# Patient Record
Sex: FEMALE | Race: WHITE | NOT HISPANIC OR LATINO | Employment: PART TIME | URBAN - METROPOLITAN AREA
[De-identification: names, ages, dates, MRNs, and addresses within clinical notes are randomized per-mention and may not be internally consistent; named-entity substitution may affect disease eponyms.]

---

## 2015-07-30 LAB
EXTERNAL HIV SCREEN: NORMAL
HCV AB SER-ACNC: NORMAL

## 2017-04-18 ENCOUNTER — ALLSCRIPTS OFFICE VISIT (OUTPATIENT)
Dept: OTHER | Facility: OTHER | Age: 22
End: 2017-04-18

## 2017-06-02 ENCOUNTER — ALLSCRIPTS OFFICE VISIT (OUTPATIENT)
Dept: OTHER | Facility: OTHER | Age: 22
End: 2017-06-02

## 2017-06-02 DIAGNOSIS — J02.9 ACUTE PHARYNGITIS: ICD-10-CM

## 2017-06-03 ENCOUNTER — HOSPITAL ENCOUNTER (EMERGENCY)
Facility: HOSPITAL | Age: 22
Discharge: HOME | End: 2017-06-04
Payer: COMMERCIAL

## 2017-06-03 VITALS
WEIGHT: 240 LBS | SYSTOLIC BLOOD PRESSURE: 185 MMHG | DIASTOLIC BLOOD PRESSURE: 93 MMHG | HEART RATE: 112 BPM | RESPIRATION RATE: 18 BRPM | BODY MASS INDEX: 38.57 KG/M2 | TEMPERATURE: 99 F | HEIGHT: 66 IN | OXYGEN SATURATION: 99 %

## 2017-06-03 DIAGNOSIS — J02.9 PHARYNGITIS, UNSPECIFIED ETIOLOGY: Primary | ICD-10-CM

## 2017-06-03 LAB — S PYO AG THROAT QL: NEGATIVE

## 2017-06-03 PROCEDURE — 87070 CULTURE OTHR SPECIMN AEROBIC: CPT | Performed by: NURSE PRACTITIONER

## 2017-06-03 PROCEDURE — 87430 STREP A AG IA: CPT | Performed by: NURSE PRACTITIONER

## 2017-06-04 PROCEDURE — 99283 EMERGENCY DEPT VISIT LOW MDM: CPT

## 2017-06-06 LAB — BACTERIA THROAT CULT: NORMAL

## 2017-07-10 ENCOUNTER — ALLSCRIPTS OFFICE VISIT (OUTPATIENT)
Dept: OTHER | Facility: OTHER | Age: 22
End: 2017-07-10

## 2017-07-28 ENCOUNTER — ALLSCRIPTS OFFICE VISIT (OUTPATIENT)
Dept: OTHER | Facility: OTHER | Age: 22
End: 2017-07-28

## 2017-08-07 ENCOUNTER — GENERIC CONVERSION - ENCOUNTER (OUTPATIENT)
Dept: OTHER | Facility: OTHER | Age: 22
End: 2017-08-07

## 2017-09-07 ENCOUNTER — ALLSCRIPTS OFFICE VISIT (OUTPATIENT)
Dept: OTHER | Facility: OTHER | Age: 22
End: 2017-09-07

## 2017-10-06 ENCOUNTER — GENERIC CONVERSION - ENCOUNTER (OUTPATIENT)
Dept: OTHER | Facility: OTHER | Age: 22
End: 2017-10-06

## 2017-10-27 ENCOUNTER — HOSPITAL ENCOUNTER (EMERGENCY)
Facility: HOSPITAL | Age: 22
Discharge: HOME | End: 2017-10-27
Attending: EMERGENCY MEDICINE
Payer: COMMERCIAL

## 2017-10-27 VITALS
HEIGHT: 65 IN | DIASTOLIC BLOOD PRESSURE: 89 MMHG | BODY MASS INDEX: 38.32 KG/M2 | WEIGHT: 230 LBS | RESPIRATION RATE: 18 BRPM | SYSTOLIC BLOOD PRESSURE: 146 MMHG | HEART RATE: 74 BPM | TEMPERATURE: 99.2 F | OXYGEN SATURATION: 99 %

## 2017-10-27 DIAGNOSIS — H65.192 ACUTE EFFUSION OF LEFT EAR: ICD-10-CM

## 2017-10-27 DIAGNOSIS — H73.012 BULLOUS MYRINGITIS OF LEFT EAR: Primary | ICD-10-CM

## 2017-10-27 PROCEDURE — 99282 EMERGENCY DEPT VISIT SF MDM: CPT

## 2017-10-27 RX ORDER — OXYMETAZOLINE HYDROCHLORIDE 0.05 G/100ML
2 SPRAY NASAL ONCE
Status: COMPLETED | OUTPATIENT
Start: 2017-10-27 | End: 2017-10-27

## 2017-10-27 RX ORDER — AMOXICILLIN 500 MG/1
1000 CAPSULE ORAL EVERY 12 HOURS SCHEDULED
Qty: 40 CAPSULE | Refills: 0 | Status: SHIPPED | OUTPATIENT
Start: 2017-10-27 | End: 2017-11-06

## 2017-10-27 RX ORDER — FLUTICASONE PROPIONATE 50 MCG
1 SPRAY, SUSPENSION (ML) NASAL DAILY
Qty: 16 G | Refills: 0 | Status: SHIPPED | OUTPATIENT
Start: 2017-10-27 | End: 2019-01-11

## 2017-10-27 RX ORDER — AMOXICILLIN 250 MG/1
1000 CAPSULE ORAL ONCE
Status: COMPLETED | OUTPATIENT
Start: 2017-10-27 | End: 2017-10-27

## 2017-10-27 RX ADMIN — OXYMETAZOLINE HYDROCHLORIDE 2 SPRAY: 5 SPRAY NASAL at 22:49

## 2017-10-27 RX ADMIN — Medication 10 MG: at 22:49

## 2017-10-27 RX ADMIN — AMOXICILLIN 1000 MG: 250 CAPSULE ORAL at 22:49

## 2017-10-28 NOTE — ED PROVIDER NOTES
History  Chief Complaint   Patient presents with    Sore Throat     Pt c/o L sided ear pain and L sided throat pain for three days  Denies fevers at home, as not been taking any medication at home      67-year-old female denies past medical history presenting chief complaint of left ear pain  Patient states that 3 days ago she woke up with an ache in her left ear feels full she states has gotten progressively worse, described as an aching sensation, she states the pain radiates in the left side of her throat posterior throat it is worse when she swallows and she feels a popping in her left ear from time to time when she swallows  She is here with 3 days of symptoms there are no other exacerbating or remitting factors she denies injury drainage change in hearing difficulty with her balance headache neck pain or stiffness fevers nausea vomiting abdominal pain change in bowels or bladder leg swelling calf pain or tenderness or rashes  Her last normal menstrual period started 3 days ago  She has no other acute complaints or concerns denies a complete review of systems as noted            None       History reviewed  No pertinent past medical history  History reviewed  No pertinent surgical history  History reviewed  No pertinent family history  I have reviewed and agree with the history as documented  Social History   Substance Use Topics    Smoking status: Former Smoker    Smokeless tobacco: Never Used    Alcohol use Yes      Comment: socially        Review of Systems   Constitutional: Negative for chills and fever  HENT: Positive for ear pain and sore throat  Negative for congestion, dental problem, drooling, ear discharge, facial swelling, hearing loss, rhinorrhea, trouble swallowing and voice change  Eyes: Negative for photophobia and pain  Respiratory: Negative for cough and shortness of breath  Cardiovascular: Negative for chest pain and palpitations     Gastrointestinal: Negative for abdominal pain, nausea and vomiting  Genitourinary: Negative for dysuria, frequency and urgency  Musculoskeletal: Negative for neck pain and neck stiffness  Skin: Negative for color change and rash  Neurological: Negative for dizziness, facial asymmetry, weakness and headaches  Hematological: Negative for adenopathy  Does not bruise/bleed easily  Psychiatric/Behavioral: Negative for agitation and behavioral problems  All other systems reviewed and are negative  Physical Exam  ED Triage Vitals [10/27/17 2122]   Temperature Pulse Respirations Blood Pressure SpO2   99 2 °F (37 3 °C) 95 18 162/91 98 %      Temp Source Heart Rate Source Patient Position - Orthostatic VS BP Location FiO2 (%)   Oral Monitor Sitting Right arm --      Pain Score       7           Orthostatic Vital Signs  Vitals:    10/27/17 2122 10/27/17 2249   BP: 162/91 146/89   Pulse: 95 74   Patient Position - Orthostatic VS: Sitting Sitting       Physical Exam   Constitutional: She is oriented to person, place, and time  She appears well-developed and well-nourished  No distress  Well-appearing in no acute distress   HENT:   Head: Normocephalic and atraumatic  Patient's head neck exam is significant for left serous effusion, her left tympanic membrane at approximately 10-12 o'clock is severely red however appears intact there is no drainage, there is no debris in canal there is no surrounding erythema or mastoid tenderness, her posterior oropharynx is mildly erythematous although she has phonating fluently without stridor trismus she has full range of motion of her neck and hyoid without pain there is no adenopathy posterior or pharyngeal structures are midline without exudate her right tympanic membranes unremarkable otherwise unremarkable head neck exam   Eyes: EOM are normal  Pupils are equal, round, and reactive to light  Neck: Normal range of motion  Neck supple  No tracheal deviation present     Cardiovascular: Normal rate, regular rhythm and normal heart sounds  Exam reveals no gallop and no friction rub  No murmur heard  Pulmonary/Chest: Effort normal and breath sounds normal  She has no wheezes  She has no rales  Musculoskeletal: Normal range of motion  She exhibits no edema or tenderness  Neurological: She is alert and oriented to person, place, and time  No cranial nerve deficit  She exhibits normal muscle tone  Coordination normal    Skin: Skin is warm and dry  No rash noted  Psychiatric: She has a normal mood and affect  Her behavior is normal    Nursing note and vitals reviewed        ED Medications  Medications   dexamethasone 10 mg/mL oral liquid 10 mg 1 mL (10 mg Oral Given 10/27/17 2249)   oxymetazoline (AFRIN) 0 05 % nasal spray 2 spray (2 sprays Each Nare Given 10/27/17 2249)   amoxicillin (AMOXIL) capsule 1,000 mg (1,000 mg Oral Given 10/27/17 2249)       Diagnostic Studies  Results Reviewed     None                 No orders to display              Procedures  Procedures       Phone Contacts  ED Phone Contact    ED Course  ED Course as of Oct 29 1156   Fri Oct 27, 2017   2303 Patient seen and re-evaluated discharge, clinically she appears to have bullous myringitis with the left serous effusion offered to treat symptomatically observe versus give antibiotics for her left bullous myringitis she opted for antibiotics that she is going to [place] tomorrow, will treat some began truck was return instructions patient agreeable plan                                MDM  Number of Diagnoses or Management Options  Acute effusion of left ear:   Bullous myringitis of left ear:   Diagnosis management comments: 58-year-old female without past medical history with 3 days of progressive left ear pain also notes some mild throat pain, on exam she is afebrile vital signs she is clinically very well appearing her left tympanic membrane is intensely erythematous in the 10-12 o'clock position with underlying serous effusion, possibly consistent with bullous myringitis with her described pain, her posterior oropharynx is mildly erythematous without other acute findings, offered patient symptom control versus antibiotics for presumed bullous myringitis, patient opted for antibiotics, will treat symptomatically close return instructions, patient agreeable to plan    CritCare Time    Disposition  Final diagnoses:   Bullous myringitis of left ear   Acute effusion of left ear     Time reflects when diagnosis was documented in both MDM as applicable and the Disposition within this note     Time User Action Codes Description Comment    10/27/2017 11:04 PM Yovany Nieves Add [H73 012] Bullous myringitis of left ear     10/27/2017 11:04 PM Lucian Lim Add [H65 [id number]] Acute effusion of left ear       ED Disposition     ED Disposition Condition Comment    Discharge  [address] [facility] [address] discharge to home/self care  Condition at discharge: Good        Follow-up Information     Follow up With Specialties Details Why Contact Info Additional Information    [facility] Emergency Medicine  If symptoms worsen [address] [facility] [address]  [phone] [place] [facility], [address], [place], [address] Kurtis Farr MD Internal Medicine  As needed [address] [facility] [address] [facility]  [phone]           Discharge Medication List as of 10/27/2017 11:05 PM      START taking these medications    Details   amoxicillin (AMOXIL) 500 mg capsule Take 2 capsules by mouth every 12 (twelve) hours for 10 days, Starting Fri 10/27/2017, Until Mon 11/6/2017, Print      fluticasone (FLONASE) 50 mcg/act nasal spray 1 spray into each nostril daily, Starting Fri 10/27/2017, Print           No discharge procedures on file      ED Provider  Electronically Signed by           Tamar Santos DO  10/29/17 6150

## 2017-10-28 NOTE — DISCHARGE INSTRUCTIONS
Please try these medications return if you have worsening or other concerning symptoms otherwise follow up as instructed      Otitis Media   WHAT YOU NEED TO KNOW:   Otitis media is an ear infection  DISCHARGE INSTRUCTIONS:   Medicines:  · Ibuprofen or acetaminophen  helps decrease your pain and fever  They are available without a doctor's order  Ask your healthcare provider which medicine is right for you  Ask how much to take and how often to take it  These medicines can cause stomach bleeding if not taken correctly  Ibuprofen can cause kidney damage  Do not take ibuprofen if you have kidney disease, an ulcer, or allergies to aspirin  Acetaminophen can cause liver damage  Do not drink alcohol if you take acetaminophen  · Ear drops  help treat your ear pain  · Antibiotics  help treat a bacterial infection that caused your ear infection  · Take your medicine as directed  Contact your healthcare provider if you think your medicine is not helping or if you have side effects  Tell him or her if you are allergic to any medicine  Keep a list of the medicines, vitamins, and herbs you take  Include the amounts, and when and why you take them  Bring the list or the pill bottles to follow-up visits  Carry your medicine list with you in case of an emergency  Heat or ice:   · Heat  may be used to decrease your pain  Place a warm, moist washcloth on your ear  Apply for 15 to 20 minutes, 3 to 4 times a day    · Ice  helps decrease swelling and pain  Use an ice pack or put crushed ice in a plastic bag  Cover the ice pack with a towel and place it on your ear for 15 to 20 minutes, 3 to 4 times a day for 2 days  Prevent otitis media:   · Wash your hands often  Use soap and water  Wash your hands after you use the bathroom, change a child's diapers, or sneeze  Wash your hands before you prepare or eat food  · Stay away from people who are ill  Some germs are easily and quickly spread through contact    Return to work or school: You may return to work or school when your fever is gone  Follow up with your healthcare provider as directed:  Write down your questions so you remember to ask them during your visits  Contact your healthcare provider if:   · Your ear pain gets worse or does not go away, even after treatment  · The outside of your ear is red or swollen  · You have vomiting or diarrhea  · You have fluid coming from your ear  · You have questions or concerns about your condition or care  Return to the emergency department if:   · You have a seizure  · You have a fever and a stiff neck  © 2017 [employer]  Information is for End User's use only and may not be sold, redistributed or otherwise used for commercial purposes  All illustrations and images included in CareNotes® are the copyrighted property of [employer]  or Allen Shultz  The above information is an  only  It is not intended as medical advice for individual conditions or treatments  Talk to your doctor, nurse or pharmacist before following any medical regimen to see if it is safe and effective for you  Serous Otitis Media   WHAT YOU NEED TO KNOW:   Serous otitis media is fluid trapped behind your tympanic membrane (eardrum), without an ear infection  Your eardrum is in your middle ear  Serous otitis media is also called otitis media with effusion  You may have fluid in your ear for months, but it usually goes away on its own  The fluid may be in one or both ears  The fluid may cause muffled sounds, and you may feel like your ears are full  Serous otitis media may be caused by an upper respiratory infection or allergies  It is most common in the fall and early spring  DISCHARGE INSTRUCTIONS:   Return to the emergency department if:   · You have a fever  · You have a sudden loss of hearing in your affected ear  · You develop a severe headache and stiff neck      · You have a seizure  Contact your healthcare provider if:   · You have fluid draining from your ear  · You have new symptoms  · You have questions or concerns about your condition or care  Follow up with your healthcare provider as directed: Your ears will need to be checked regularly  You may need to see a specialist  Write down your questions so you remember to ask them during your visits  © 2017 [employer] Information is for End User's use only and may not be sold, redistributed or otherwise used for commercial purposes  All illustrations and images included in CareNotes® are the copyrighted property of [employer]  or Allen Shultz  The above information is an  only  It is not intended as medical advice for individual conditions or treatments  Talk to your doctor, nurse or pharmacist before following any medical regimen to see if it is safe and effective for you

## 2018-01-10 NOTE — PROGRESS NOTES
History of Present Illness  Bariatric Behavioral Health Evaluation [facility]:   She is here today because: Increase health, increase mobility and prevent family health issues  She is seeking a Bariatric surgery evaluation  She researched this option for: Less than 1 month; referred by PCP  She realizes the post-op requirements Yes, patient has researched procedure; patient has friends with bariatric surgery  Her Psychiatric/Psychological diagnosis: She does not have an outpatient counselor  She does not have the counselor's number  She does not have a Psychiatrist  She does not have the Psychiatrist's number  She has not had Inpatient Treatment  (None reported )  Family Constellation: Family Constellation: Mother: 39years old  Father: 40years old  Siblings: 1 brother and 1 sister  She lives withher father   Domestic Violence: has not happened  Abuse History: (None reported )   Physical/psychological assessment Appearance: appropriate   Sociability: average  Affect: appropriate  Mood: calm  Thought Process: coherent  Speech: normal  Content: no impairment  The patient was oriented to person, oriented to place, oriented to time and normal memory   normal attention span  no decreased concentration ability  normal judgment  Her emotional insight was: good  Her intellectual insight was: good  Risk assessment: Symptoms:  no suicidal ideation, no suicide plan, no suicide attempt, no homicidal thoughts, no hopelessness, no helplessness, no feelings of despair, no anxiety, no depressed mood, no loss of interests, no anhedonia and no sense of isolation  The patient is currently asymptomatic  Associated symptoms:  no aggressive behavior, no high risk behavior, no racing thoughts, no periods of excess energy, no periods of euphoria, no delusions, no command hallucinations, no auditory hallucinations and no visual hallucinations  No associated symptoms are reported   The patient is not currently being treated for this problem  Pertinent medical history:  no depression, no seasonal affective disorder, no premenstrual dysphoric disorder, no postpartum depression, no anxiety disorder, no bipolar disorder, no post traumatic stress disorder, no eating disorder, no personality disorder, no schizophrenia, no chronic pain, no chronic headaches, no dementia, no malignancy and no HIV infection  Risk factors:  no bereavement, no financial stress, no relationship problems, no job loss, no social isolation, no access to lethal means, no alcohol abuse, no substance abuse, no physical abuse, no sexual abuse, no emotional abuse, no bullying, no previous suicide attempt, no recent psychiatric hospitalization and no recent family suicide  No risk factors have been identified  Family history:  no suicide, no depression, no bipolar disorder and no substance abuse  She was previously evaluated by me  Sexual history: She is not pregnant  She does not have a history of   She does not have a history of miscarriage  She does not have a history of hypersexuality  She does not have a history of STD's  Recommendations: She is recommended for surgery  She states adequate knowledge of nutrition, exercise, and behavior modification  The Following Ratings are based on my: Obsevation of this individual over the last  Risk of Harm to Self or Others: The following are demographic risk factors associated with harm to self: , [place], or   (None reported )  Recent Specific Risk Factors: The patient is currently asymptomatic  No associated symptoms are reported  Summary and Recommendations:   Low: No thoughts or occasional thoughts of suicide, but no intent or actions  Self inflicted scratches, abrasions, or other self- injurious of behavior where medical attention is typically not warranted  No elements of homicidality or occasional thoughts but no plan, intent, or actions  Active Problems    1   Benign essential hypertension (401 1) (I10)   2  Dental infection (522 4) (K04 7)   3  Encounter for administrative examinations (V68 9) (Z02 9)   4  Encounter for gynecological examination (V72 31) (Z01 419)   5  Morbid obesity (278 01) (E66 01)   6  Need for hepatitis B vaccination (V05 3) (Z23)   7  Post-nasal drip (784 91) (R09 82)   8  Screening for tuberculosis (V74 1) (Z11 1)    Past Medical History    1  History of acute pharyngitis (V12 69) (Z87 09)   2  History of hypertension (V12 59) (Z86 79)   3  History of upper respiratory infection (V12 09) (Z87 09)   4  History of Otitis media, unspecified laterality    Family History  Mother    1  Family history of Hypertension (V17 49)  Father    2  Family history of Coronary Artery Disease (V17 49)   3  Family history of Myocardial Infarction Arrhythmias    Social History    · Current smoker on some days (305 1) (F17 200)   · Currently In School   · Denied: History of Drug Use   · Former smoker (U30 08) (B20 188)   · Social alcohol use (Z78 9)    Current Meds   1  Advil 200 MG Oral Capsule; TAKE 1 CAPSULE EVERY 4 TO 6 HOURS; Therapy: [id number] to Recorded   2  One Daily Womens Oral Tablet; Therapy: (Recorded:[id number]) to Recorded    Allergies    1  No Known Drug Allergies    Vitals  Signs   Recorded: [id number] 11:19AM   Height: 5 ft 5 in  Weight: 254 lb 5 oz  BMI Calculated: 42 32  BSA Calculated: 2 19     Note   Note:   Completed [address] [place] [address]  Provided patient, education as needed  Patient denies to [place] I diagnosis  Patient will work on the following goals; identify strategies to practice in stead of reaching for food and increase physical activity  Patient is knowledgeable of pre and post op requirements and meets criteria for [facility] bariatric surgery program  Patient is referred to physician  Future Appointments    Date/Time Provider Specialty Site   10/06/2017 02:00 PM LAURITA Quintero   General Surgery [facility]   01/12/2018 09:15 AM LAURITA Carlson  Internal Medicine [facility]    09/07/2017 09:00 [facility]     Signatures   Electronically signed by : KARTHIK Aldana; Aug  7 2017 12:00PM EST                       (Author)    Electronically signed by :  LAURITA Doyle ; Aug  8 2017  9:16AM EST

## 2018-01-12 VITALS
DIASTOLIC BLOOD PRESSURE: 98 MMHG | WEIGHT: 254.13 LBS | HEIGHT: 65 IN | HEART RATE: 91 BPM | SYSTOLIC BLOOD PRESSURE: 142 MMHG | OXYGEN SATURATION: 98 % | BODY MASS INDEX: 42.34 KG/M2

## 2018-01-12 VITALS
HEART RATE: 92 BPM | SYSTOLIC BLOOD PRESSURE: 162 MMHG | OXYGEN SATURATION: 98 % | BODY MASS INDEX: 40.15 KG/M2 | HEIGHT: 65 IN | WEIGHT: 241 LBS | DIASTOLIC BLOOD PRESSURE: 94 MMHG | TEMPERATURE: 98.2 F

## 2018-01-12 NOTE — RESULT NOTES
Verified Results  MMR (IGG) PANEL (MEASLES, MUMPS, RUBELLA) [id number] 02:38PM Courtney Razo     Test Name Result Flag Reference   MEASLES ANTIBODY (IGG) 2 43 index     Index       Explanation of Test Results       ---------     ---------------------------      < or = 0 90    Negative - No Rubeola (Measles) IgG                                Antibody detected      0 91 - 1 09    Equivocal      > or = 1 10    Positive - Rubeola (Measles) IgG                                Antibody detected         Positive results suggest recent or previous infection      with Measles (Rubeola) virus and imply immunity  Patients exhibiting equivocal results should be      retested in one month, if clinically indicated  MUMPS VIRUS$ANTIBODY (IGG) 3 01 index     Index          Interpretation  < or = 0 90    Negative  0 91-1 09      Equivocal  > or = 1 10    Positive  A positive result indicates that the patient has antibody  to mumps virus  It does not differentiate between an  active or past infection  The clinical diagnosis must  be interpreted in conjunction with the clinical signs  and symptoms of the patient  RUBELLA ANTIBODY (IGG) 2 47     Value            Interpretation           -----            --------------             < or = 0 90      Not consistent with Immunity           0 91-1 09        Equivocal           > or = 1 10      Consistent with Immunity      The presence of rubella IgG antibody suggests   immunization or past or current infection with  rubella virus       (Q) VARICELLA ZOSTER VIRUS AB (IGG) [id number] 02:38PM Courtney Melendez   REPORT COMMENT:  FASTING:NO     Test Name Result Flag Reference   VARICELLA ZOSTER VIRUS$AB (IGG) 1 71 index     Index       Explanation  of Results       ---------     ----------------------      < or = 0 90    Negative - No VZV IgG Antibody detected      0 91 - 1 09    Equivocal      > or = 1 10    Positive - VZV IgG Antibody detected         A positive result indicates that the patient      has antibody to VZV but does not differentiate      between infection (active or past) and vaccination  The clinical diagnosis must be interpreted in       conjunction with the clinical signs and symptoms of       the patient  This assay reliably measures immunity      due to previous infection but may not always be       sensitive enough to detect antibodies induced by      vaccination  Thus, a negative result in a vaccinated      individual does not necessarily indicate      susceptibility to VZV infection

## 2018-01-13 VITALS — BODY MASS INDEX: 42.37 KG/M2 | HEIGHT: 65 IN | WEIGHT: 254.31 LBS

## 2018-01-14 VITALS
HEIGHT: 65 IN | TEMPERATURE: 98.4 F | OXYGEN SATURATION: 98 % | SYSTOLIC BLOOD PRESSURE: 148 MMHG | HEART RATE: 88 BPM | DIASTOLIC BLOOD PRESSURE: 94 MMHG | WEIGHT: 236.13 LBS | BODY MASS INDEX: 39.34 KG/M2

## 2018-01-14 VITALS — WEIGHT: 254.5 LBS | HEIGHT: 65 IN | BODY MASS INDEX: 42.4 KG/M2

## 2018-01-14 VITALS
TEMPERATURE: 98 F | HEART RATE: 72 BPM | RESPIRATION RATE: 22 BRPM | SYSTOLIC BLOOD PRESSURE: 134 MMHG | BODY MASS INDEX: 42.4 KG/M2 | HEIGHT: 65 IN | DIASTOLIC BLOOD PRESSURE: 100 MMHG | WEIGHT: 254.5 LBS

## 2018-01-15 VITALS
WEIGHT: 246.38 LBS | SYSTOLIC BLOOD PRESSURE: 152 MMHG | HEART RATE: 97 BPM | OXYGEN SATURATION: 98 % | DIASTOLIC BLOOD PRESSURE: 98 MMHG | BODY MASS INDEX: 41.05 KG/M2 | HEIGHT: 65 IN

## 2018-01-17 NOTE — MISCELLANEOUS
Provider Comments  Provider Comments:   Walker Young 87,    We called you about your scheduled appointment for today but were unable to reach you  It is very important that you follow up with us so that we can assess your physical and nutritional safety  Please call our office at [phone] to reschedule your appointment       Sincerely,     Andrew [facility]          Signatures   Electronically signed by : Fanny Jackson, ; Sep  7 2017  9:27AM EST                       (Author)

## 2018-01-17 NOTE — MISCELLANEOUS
Provider Comments  Provider Comments:   Dear Wayne Bull had a scheduled appointment at our office today but did not show  We attempted to call you back but were unable to reach you  It is very important that you follow up with us so that we can assess your physical and nutritional safety after your surgery  Please call our office at [phone] to reschedule your appointment  Sincerely,     Patria Jensen [facility]          Signatures   Electronically signed by :  LAURITA Godoy ; Nov 7 2017  9:21AM EST

## 2019-01-11 ENCOUNTER — HOSPITAL ENCOUNTER (EMERGENCY)
Facility: HOSPITAL | Age: 24
Discharge: HOME | End: 2019-01-12
Attending: EMERGENCY MEDICINE | Admitting: EMERGENCY MEDICINE
Payer: COMMERCIAL

## 2019-01-11 VITALS
HEART RATE: 90 BPM | OXYGEN SATURATION: 96 % | TEMPERATURE: 98 F | RESPIRATION RATE: 20 BRPM | HEIGHT: 65 IN | BODY MASS INDEX: 46.1 KG/M2 | WEIGHT: 276.68 LBS

## 2019-01-11 DIAGNOSIS — J02.9 VIRAL PHARYNGITIS: Primary | ICD-10-CM

## 2019-01-11 PROCEDURE — 99283 EMERGENCY DEPT VISIT LOW MDM: CPT

## 2019-01-12 LAB — S PYO AG THROAT QL: NEGATIVE

## 2019-01-12 PROCEDURE — 87430 STREP A AG IA: CPT | Performed by: EMERGENCY MEDICINE

## 2019-01-12 RX ORDER — OXYMETAZOLINE HYDROCHLORIDE 0.05 G/100ML
2 SPRAY NASAL ONCE
Status: COMPLETED | OUTPATIENT
Start: 2019-01-12 | End: 2019-01-12

## 2019-01-12 RX ORDER — NAPROXEN 250 MG/1
500 TABLET ORAL ONCE
Status: COMPLETED | OUTPATIENT
Start: 2019-01-12 | End: 2019-01-12

## 2019-01-12 RX ORDER — LORATADINE 10 MG/1
10 TABLET ORAL DAILY
COMMUNITY
End: 2022-04-25 | Stop reason: ALTCHOICE

## 2019-01-12 RX ORDER — NAPROXEN 500 MG/1
500 TABLET ORAL 2 TIMES DAILY WITH MEALS
Qty: 10 TABLET | Refills: 0 | Status: SHIPPED | OUTPATIENT
Start: 2019-01-12 | End: 2020-06-05

## 2019-01-12 RX ADMIN — OXYMETAZOLINE HYDROCHLORIDE 2 SPRAY: 0.05 SPRAY NASAL at 01:14

## 2019-01-12 RX ADMIN — NAPROXEN 500 MG: 250 TABLET ORAL at 01:14

## 2019-01-12 RX ADMIN — DEXAMETHASONE SODIUM PHOSPHATE 10 MG: 10 INJECTION, SOLUTION INTRAMUSCULAR; INTRAVENOUS at 00:37

## 2019-01-12 NOTE — DISCHARGE INSTRUCTIONS
Pharyngitis   WHAT YOU NEED TO KNOW:   What is pharyngitis? Pharyngitis, or sore throat, is inflammation of the tissues and structures in your pharynx (throat)  Pharyngitis is most often caused by bacteria  It may also be caused by a cold or flu virus  Other causes include smoking, allergies, or acid reflux  What signs and symptoms may occur with pharyngitis? · Sore throat or pain when you swallow    · Fever, chills, and body aches    · Hoarse or raspy voice    · Cough, runny or stuffy nose, itchy or watery eyes    · Headache    · Upset stomach and loss of appetite    · Mild neck stiffness    · Swollen glands that feel like hard lumps when you touch your neck    · White and yellow pus-filled blisters in the back of your throat  How is pharyngitis diagnosed? Tell your healthcare provider about your symptoms  He may look inside your throat and feel your neck  You may also need the following tests:  · A throat culture  may show which germ is causing your sore throat  A cotton swab is rubbed against the back of your throat  · Blood tests  may be used to show if another medical condition is causing your sore throat  How is pharyngitis treated? Viral pharyngitis will go away on its own without treatment  Your sore throat should start to feel better in 3 to 5 days for both viral and bacterial infections  You may need any of the following:  · Antibiotics  treat a bacterial infection  · NSAIDs , such as ibuprofen, help decrease swelling, pain, and fever  NSAIDs can cause stomach bleeding or kidney problems in certain people  If you take blood thinner medicine, always ask your healthcare provider if NSAIDs are safe for you  Always read the medicine label and follow directions  · Acetaminophen  decreases pain and fever  It is available without a doctor's order  Ask how much to take and how often to take it  Follow directions  Acetaminophen can cause liver damage if not taken correctly    How can I manage my symptoms? · Gargle salt water  Mix ¼ teaspoon salt in an 8 ounce glass of warm water and gargle  This may help decrease swelling in your throat  · Drink liquids as directed  You may need to drink more liquids than usual  Liquids may help soothe your throat and prevent dehydration  Ask how much liquid to drink each day and which liquids are best for you  · Use a cool-steam humidifier  to help moisten the air in your room and decrease your cough  · Soothe your throat  with cough drops, ice, soft foods, or popsicles  How can I prevent the spread of pharyngitis? Cover your mouth and nose when you cough or sneeze  Do not share food or drinks  Wash your hands often  Use soap and water  If soap and water are unavailable, use an alcohol based hand   Call 911 for any of the following:   · You have trouble breathing or swallowing because your throat is swollen or sore  When should I seek immediate care? · You are drooling because it hurts too much to swallow  · Your fever is higher than 102? F (39?C) or lasts longer than 3 days  · You are confused  · You taste blood in your throat  When should I contact my healthcare provider? · Your throat pain gets worse  · You have a painful lump in your throat that does not go away after 5 days  · Your symptoms do not improve after 5 days  · You have questions or concerns about your condition or care  CARE AGREEMENT:   You have the right to help plan your care  Learn about your health condition and how it may be treated  Discuss treatment options with your caregivers to decide what care you want to receive  You always have the right to refuse treatment  The above information is an  only  It is not intended as medical advice for individual conditions or treatments  Talk to your doctor, nurse or pharmacist before following any medical regimen to see if it is safe and effective for you    © 2017 [employer] Information is for End User's use only and may not be sold, redistributed or otherwise used for commercial purposes  All illustrations and images included in CareNotes® are the copyrighted property of [employer]  or Allen Shultz

## 2019-01-12 NOTE — ED PROVIDER NOTES
History  Chief Complaint   Patient presents with    Sore Throat     pt c/o of a sore throat and a right sided earche that started four days ago    Earache     59-year-old female who works at a  and is exposed to many sick children presents to the emergency department for sore throat and right ear pain  Patient states the symptoms have been worsening over the past 4 days  She states this feels similar to a time in the past when she was diagnosed with strep throat  She has attempted the home remedies without relief  She is having some nasal congestion as well  No facial tenderness, mild dry cough but no productive cough  No fevers or chills and presents here afebrile  Prior to Admission Medications   Prescriptions Last Dose Informant Patient Reported? Taking? Multiple Vitamins-Calcium (ONE-A-DAY WOMENS PO)   Yes Yes   Sig: Take by mouth   loratadine (CLARITIN) 10 mg tablet   Yes Yes   Sig: Take 10 mg by mouth daily      Facility-Administered Medications: None       History reviewed  No pertinent past medical history  History reviewed  No pertinent surgical history  History reviewed  No pertinent family history  I have reviewed and agree with the history as documented  Social History   Substance Use Topics    Smoking status: Former Smoker    Smokeless tobacco: Never Used    Alcohol use Yes      Comment: socially        Review of Systems   Constitutional: Negative for chills, fatigue and fever  HENT: Positive for congestion, ear pain (Right more than left) and sore throat  Negative for dental problem, ear discharge, rhinorrhea, sinus pain and sinus pressure  Respiratory: Negative for cough, chest tightness, shortness of breath and wheezing  Cardiovascular: Negative for chest pain and leg swelling  Gastrointestinal: Negative for abdominal pain, nausea and vomiting  Musculoskeletal: Negative for back pain and neck pain  Skin: Negative for wound     Neurological: Negative for dizziness and headaches  Physical Exam  Physical Exam   Constitutional: She is oriented to person, place, and time  She appears well-developed and well-nourished  No distress  HENT:   Head: Normocephalic and atraumatic  Right Ear: External ear normal    Left Ear: External ear normal    Mouth/Throat: Oropharynx is clear and moist  No oropharyngeal exudate (Erythema without exudate)  No TM redness or bulge  Minor fluid behind right TM  Eyes: Conjunctivae and EOM are normal    Neck: Normal range of motion  Cardiovascular: Normal rate and regular rhythm  Pulmonary/Chest: Effort normal and breath sounds normal  No stridor  No respiratory distress  She has no wheezes  She exhibits no tenderness  No rhonchi  Good air movement  Abdominal: Soft  There is no tenderness  Musculoskeletal: Normal range of motion  Lymphadenopathy:     She has cervical adenopathy ( anterior, mildly tender)  Neurological: She is alert and oriented to person, place, and time  No cranial nerve deficit  Skin: Skin is warm and dry  She is not diaphoretic  No pallor  Psychiatric: She has a normal mood and affect  Her behavior is normal    Vitals reviewed        Vital Signs  ED Triage Vitals [01/11/19 2355]   Temperature Pulse Respirations BP SpO2   98 °F (36 7 °C) 90 20 -- 96 %      Temp Source Heart Rate Source Patient Position - Orthostatic VS BP Location FiO2 (%)   Oral Monitor Sitting Right arm --      Pain Score       7           Vitals:    01/11/19 2355   Pulse: 90   Patient Position - Orthostatic VS: Sitting       Visual Acuity      ED Medications  Medications   dexamethasone 10 mg/mL oral liquid 10 mg 1 mL (10 mg Oral Given 1/12/19 0037)       Diagnostic Studies  Results Reviewed     Procedure Component Value Units Date/Time    Rapid Strep A Screen Only, Adults [[id number]]  (Normal) Collected:  01/12/19 0037    Lab Status:  Final result Specimen:  Throat from Throat Updated:  01/12/19 0050     Rapid Strep A Screen Negative                 No orders to display              Procedures  Procedures       Phone Contacts  ED Phone Contact    ED Course  ED Course as of Jan 12 0103   Sat Jan 12, 2019   0100 RAPID STREP A SCREEN: Negative                               [facility]  Number of Diagnoses or Management Options  Diagnosis management comments: Sore throat, earache  Will do strep swab for strep throat  Symptomatic treatment  Strep is negative  Likely viral pharyngitis  Patient will be discharged and advised symptomatic treatment for home  Advised good return precautions in case of worsening condition  Amount and/or Complexity of Data Reviewed  Clinical lab tests: ordered and reviewed      CritCare Time    Disposition  Final diagnoses:   Viral pharyngitis     Time reflects when diagnosis was documented in both MDM as applicable and the Disposition within this note     Time User Action Codes Description Comment    1/12/2019  1:01 AM Leslie Jensen [J02 9] Viral pharyngitis       ED Disposition     ED Disposition Condition Comment    Discharge  [address] [place] [address],[address] discharge to home/self care      Condition at discharge: Good        Follow-up Information     Follow up With Specialties Details Why [facility] Emergency Department Emergency Medicine  If symptoms worsen:  Pain, uncontrollable fever, difficulty breathing, unable to swallow, etc [address] [address] ED, [address] [place] [address],  Kurtis Farr MD Internal Medicine Schedule an appointment as soon as possible for a visit For follow up to ensure improvement, and for further testing and treatment as needed [address] [facility] [address] [facility]  [phone]             Patient's Medications   Discharge Prescriptions    NAPROXEN (NAPROSYN) 500 MG TABLET    Take 1 tablet (500 mg total) by mouth 2 (two) times a day with meals for 5 days As needed for pain control in throat       Start Date: 1/12/2019 End Date: 1/17/2019       Order Dose: 500 mg       Quantity: 10 tablet    Refills: 0     No discharge procedures on file      ED Provider  Electronically Signed by           Kimani Morrison DO  01/12/19 5237

## 2019-01-14 ENCOUNTER — TELEPHONE (OUTPATIENT)
Dept: INTERNAL MEDICINE CLINIC | Facility: CLINIC | Age: 24
End: 2019-01-14

## 2020-01-08 ENCOUNTER — OFFICE VISIT (OUTPATIENT)
Dept: URGENT CARE | Facility: CLINIC | Age: 25
End: 2020-01-08
Payer: COMMERCIAL

## 2020-01-08 VITALS
HEART RATE: 93 BPM | DIASTOLIC BLOOD PRESSURE: 96 MMHG | HEIGHT: 65 IN | BODY MASS INDEX: 43.49 KG/M2 | RESPIRATION RATE: 18 BRPM | TEMPERATURE: 98.2 F | SYSTOLIC BLOOD PRESSURE: 162 MMHG | OXYGEN SATURATION: 95 % | WEIGHT: 261 LBS

## 2020-01-08 DIAGNOSIS — J01.90 ACUTE SINUSITIS, RECURRENCE NOT SPECIFIED, UNSPECIFIED LOCATION: Primary | ICD-10-CM

## 2020-01-08 PROCEDURE — G0382 LEV 3 HOSP TYPE B ED VISIT: HCPCS | Performed by: PHYSICIAN ASSISTANT

## 2020-01-08 RX ORDER — AMOXICILLIN AND CLAVULANATE POTASSIUM 875; 125 MG/1; MG/1
1 TABLET, FILM COATED ORAL EVERY 12 HOURS SCHEDULED
Qty: 20 TABLET | Refills: 0 | Status: SHIPPED | OUTPATIENT
Start: 2020-01-08 | End: 2020-01-18

## 2020-01-09 NOTE — PROGRESS NOTES
[facility]        NAME: Yonatan Tovar is a 21 y o  female  : 1995    MRN: [id number]  DATE: 2020  TIME: 7:28 PM    Assessment and Plan   Acute sinusitis, recurrence not specified, unspecified location [J01 90]  1  Acute sinusitis, recurrence not specified, unspecified location  amoxicillin-clavulanate (AUGMENTIN) 875-125 mg per tablet         Patient Instructions     Follow up with PCP in 3-5 days  Proceed to  ER if symptoms worsen  Chief Complaint     Chief Complaint   Patient presents with    Cough     symptoms started 7 days ago  Pt states taking claritin but still having symptoms   Earache    Nasal Congestion    Sore Throat         History of Present Illness       26-year-old female presents for evaluation of cough, nasal congestion, sore throat and ear pain  Symptoms ongoing for 1 week  Patient not receive a flu vaccine  Patient denies body aches  Denies recent travel  Denies shortness of breath  Review of Systems   Review of Systems   Constitutional: Negative for chills, fatigue and fever  HENT: Positive for congestion and sore throat  Negative for ear pain, sinus pain and trouble swallowing  Eyes: Negative for pain, discharge and redness  Respiratory: Positive for cough  Negative for chest tightness, shortness of breath and wheezing  Cardiovascular: Negative for chest pain, palpitations and leg swelling  Gastrointestinal: Negative for abdominal pain, diarrhea, nausea and vomiting  Musculoskeletal: Negative for arthralgias, joint swelling and myalgias  Skin: Negative for rash  Neurological: Negative for dizziness, weakness, numbness and headaches           Current Medications       Current Outpatient Medications:     loratadine (CLARITIN) 10 mg tablet, Take 10 mg by mouth daily, Disp: , Rfl:     Multiple Vitamins-Calcium (ONE-A-DAY WOMENS PO), Take by mouth, Disp: , Rfl:     amoxicillin-clavulanate (AUGMENTIN) 875-125 mg per tablet, Take 1 tablet by mouth every 12 (twelve) hours for 10 days, Disp: 20 tablet, Rfl: 0    naproxen (NAPROSYN) 500 mg tablet, Take 1 tablet (500 mg total) by mouth 2 (two) times a day with meals for 5 days As needed for pain control in throat, Disp: 10 tablet, Rfl: 0    Current Allergies     Allergies as of 01/08/2020    (No Known Allergies)            The following portions of the patient's history were reviewed and updated as appropriate: allergies, current medications, past family history, past medical history, past social history, past surgical history and problem list      History reviewed  No pertinent past medical history  History reviewed  No pertinent surgical history  History reviewed  No pertinent family history  Medications have been verified  Objective   /96   Pulse 93   Temp 98 2 °F (36 8 °C) (Temporal)   Resp 18   Ht 5' 5" (1 651 m)   Wt 118 kg (261 lb)   SpO2 95%   BMI 43 43 kg/m²        Physical Exam     Physical Exam   Constitutional: She appears well-developed and well-nourished  HENT:   Head: Normocephalic  Right Ear: Hearing and tympanic membrane normal    Left Ear: Hearing and tympanic membrane normal    Nose: No mucosal edema  Mouth/Throat: Uvula is midline and mucous membranes are normal  Posterior oropharyngeal erythema present  Cardiovascular: Normal rate and regular rhythm  Pulmonary/Chest: Effort normal and breath sounds normal    Nursing note and vitals reviewed

## 2020-06-05 ENCOUNTER — TELEPHONE (OUTPATIENT)
Dept: INTERNAL MEDICINE CLINIC | Facility: CLINIC | Age: 25
End: 2020-06-05

## 2020-06-05 ENCOUNTER — OFFICE VISIT (OUTPATIENT)
Dept: INTERNAL MEDICINE CLINIC | Facility: CLINIC | Age: 25
End: 2020-06-05
Payer: COMMERCIAL

## 2020-06-05 VITALS
OXYGEN SATURATION: 98 % | HEIGHT: 65 IN | DIASTOLIC BLOOD PRESSURE: 100 MMHG | WEIGHT: 261.4 LBS | SYSTOLIC BLOOD PRESSURE: 150 MMHG | TEMPERATURE: 98.2 F | HEART RATE: 107 BPM | BODY MASS INDEX: 43.55 KG/M2

## 2020-06-05 DIAGNOSIS — I10 BENIGN ESSENTIAL HYPERTENSION: ICD-10-CM

## 2020-06-05 DIAGNOSIS — Z01.419 PAP SMEAR, AS PART OF ROUTINE GYNECOLOGICAL EXAMINATION: Primary | ICD-10-CM

## 2020-06-05 DIAGNOSIS — F32.9 MAJOR DEPRESSIVE DISORDER, REMISSION STATUS UNSPECIFIED, UNSPECIFIED WHETHER RECURRENT: ICD-10-CM

## 2020-06-05 DIAGNOSIS — F41.9 ANXIETY: ICD-10-CM

## 2020-06-05 PROCEDURE — 3077F SYST BP >= 140 MM HG: CPT | Performed by: PHYSICIAN ASSISTANT

## 2020-06-05 PROCEDURE — 1036F TOBACCO NON-USER: CPT | Performed by: PHYSICIAN ASSISTANT

## 2020-06-05 PROCEDURE — 3080F DIAST BP >= 90 MM HG: CPT | Performed by: PHYSICIAN ASSISTANT

## 2020-06-05 PROCEDURE — 99214 OFFICE O/P EST MOD 30 MIN: CPT | Performed by: PHYSICIAN ASSISTANT

## 2020-06-05 PROCEDURE — 3008F BODY MASS INDEX DOCD: CPT | Performed by: PHYSICIAN ASSISTANT

## 2020-06-11 ENCOUNTER — APPOINTMENT (OUTPATIENT)
Dept: LAB | Facility: CLINIC | Age: 25
End: 2020-06-11
Payer: COMMERCIAL

## 2020-06-11 DIAGNOSIS — I10 BENIGN ESSENTIAL HYPERTENSION: ICD-10-CM

## 2020-06-11 DIAGNOSIS — F32.9 MAJOR DEPRESSIVE DISORDER, REMISSION STATUS UNSPECIFIED, UNSPECIFIED WHETHER RECURRENT: ICD-10-CM

## 2020-06-11 DIAGNOSIS — F41.9 ANXIETY: ICD-10-CM

## 2020-06-11 LAB
ALBUMIN SERPL BCP-MCNC: 3.9 G/DL (ref 3.5–5)
ALP SERPL-CCNC: 68 U/L (ref 46–116)
ALT SERPL W P-5'-P-CCNC: 58 U/L (ref 12–78)
ANION GAP SERPL CALCULATED.3IONS-SCNC: 7 MMOL/L (ref 4–13)
AST SERPL W P-5'-P-CCNC: 32 U/L (ref 5–45)
BASOPHILS # BLD AUTO: 0.1 THOUSANDS/ΜL (ref 0–0.1)
BASOPHILS NFR BLD AUTO: 1 % (ref 0–1)
BILIRUB SERPL-MCNC: 0.58 MG/DL (ref 0.2–1)
BILIRUB UR QL STRIP: ABNORMAL
BUN SERPL-MCNC: 10 MG/DL (ref 5–25)
CALCIUM SERPL-MCNC: 9.2 MG/DL (ref 8.3–10.1)
CHLORIDE SERPL-SCNC: 107 MMOL/L (ref 100–108)
CHOLEST SERPL-MCNC: 177 MG/DL (ref 50–200)
CLARITY UR: ABNORMAL
CO2 SERPL-SCNC: 24 MMOL/L (ref 21–32)
COLOR UR: ABNORMAL
CREAT SERPL-MCNC: 0.85 MG/DL (ref 0.6–1.3)
EOSINOPHIL # BLD AUTO: 0.3 THOUSAND/ΜL (ref 0–0.61)
EOSINOPHIL NFR BLD AUTO: 4 % (ref 0–6)
ERYTHROCYTE [DISTWIDTH] IN BLOOD BY AUTOMATED COUNT: 13 % (ref 11.6–15.1)
EST. AVERAGE GLUCOSE BLD GHB EST-MCNC: 108 MG/DL
GFR SERPL CREATININE-BSD FRML MDRD: 95 ML/MIN/1.73SQ M
GLUCOSE P FAST SERPL-MCNC: 88 MG/DL (ref 65–99)
GLUCOSE UR STRIP-MCNC: NEGATIVE MG/DL
HBA1C MFR BLD: 5.4 %
HCT VFR BLD AUTO: 43.5 % (ref 34.8–46.1)
HDLC SERPL-MCNC: 25 MG/DL
HGB BLD-MCNC: 14.8 G/DL (ref 11.5–15.4)
HGB UR QL STRIP.AUTO: NEGATIVE
IMM GRANULOCYTES # BLD AUTO: 0.02 THOUSAND/UL (ref 0–0.2)
IMM GRANULOCYTES NFR BLD AUTO: 0 % (ref 0–2)
KETONES UR STRIP-MCNC: NEGATIVE MG/DL
LDLC SERPL CALC-MCNC: 119 MG/DL (ref 0–100)
LEUKOCYTE ESTERASE UR QL STRIP: NEGATIVE
LYMPHOCYTES # BLD AUTO: 3.05 THOUSANDS/ΜL (ref 0.6–4.47)
LYMPHOCYTES NFR BLD AUTO: 38 % (ref 14–44)
MCH RBC QN AUTO: 29.7 PG (ref 26.8–34.3)
MCHC RBC AUTO-ENTMCNC: 34 G/DL (ref 31.4–37.4)
MCV RBC AUTO: 87 FL (ref 82–98)
MONOCYTES # BLD AUTO: 0.61 THOUSAND/ΜL (ref 0.17–1.22)
MONOCYTES NFR BLD AUTO: 8 % (ref 4–12)
NEUTROPHILS # BLD AUTO: 3.89 THOUSANDS/ΜL (ref 1.85–7.62)
NEUTS SEG NFR BLD AUTO: 49 % (ref 43–75)
NITRITE UR QL STRIP: NEGATIVE
NONHDLC SERPL-MCNC: 152 MG/DL
NRBC BLD AUTO-RTO: 0 /100 WBCS
PH UR STRIP.AUTO: 5.5 [PH]
PLATELET # BLD AUTO: 206 THOUSANDS/UL (ref 149–390)
PMV BLD AUTO: 10.7 FL (ref 8.9–12.7)
POTASSIUM SERPL-SCNC: 3.8 MMOL/L (ref 3.5–5.3)
PROT SERPL-MCNC: 7.6 G/DL (ref 6.4–8.2)
PROT UR STRIP-MCNC: NEGATIVE MG/DL
RBC # BLD AUTO: 4.99 MILLION/UL (ref 3.81–5.12)
SODIUM SERPL-SCNC: 138 MMOL/L (ref 136–145)
SP GR UR STRIP.AUTO: 1.02 (ref 1–1.03)
TRIGL SERPL-MCNC: 164 MG/DL
TSH SERPL DL<=0.05 MIU/L-ACNC: 2.38 UIU/ML (ref 0.36–3.74)
UROBILINOGEN UR QL STRIP.AUTO: 0.2 E.U./DL
WBC # BLD AUTO: 7.97 THOUSAND/UL (ref 4.31–10.16)

## 2020-06-11 PROCEDURE — 83036 HEMOGLOBIN GLYCOSYLATED A1C: CPT

## 2020-06-11 PROCEDURE — 80061 LIPID PANEL: CPT

## 2020-06-11 PROCEDURE — 84443 ASSAY THYROID STIM HORMONE: CPT

## 2020-06-11 PROCEDURE — 81003 URINALYSIS AUTO W/O SCOPE: CPT | Performed by: PHYSICIAN ASSISTANT

## 2020-06-11 PROCEDURE — 85025 COMPLETE CBC W/AUTO DIFF WBC: CPT

## 2020-06-11 PROCEDURE — 80053 COMPREHEN METABOLIC PANEL: CPT

## 2020-06-11 PROCEDURE — 36415 COLL VENOUS BLD VENIPUNCTURE: CPT

## 2020-06-15 ENCOUNTER — TELEPHONE (OUTPATIENT)
Dept: INTERNAL MEDICINE CLINIC | Facility: CLINIC | Age: 25
End: 2020-06-15

## 2020-06-16 ENCOUNTER — APPOINTMENT (OUTPATIENT)
Dept: LAB | Facility: CLINIC | Age: 25
End: 2020-06-16
Payer: COMMERCIAL

## 2020-06-16 ENCOUNTER — TELEPHONE (OUTPATIENT)
Dept: INTERNAL MEDICINE CLINIC | Facility: CLINIC | Age: 25
End: 2020-06-16

## 2020-06-16 ENCOUNTER — OFFICE VISIT (OUTPATIENT)
Dept: INTERNAL MEDICINE CLINIC | Facility: CLINIC | Age: 25
End: 2020-06-16
Payer: COMMERCIAL

## 2020-06-16 VITALS
HEART RATE: 85 BPM | SYSTOLIC BLOOD PRESSURE: 128 MMHG | DIASTOLIC BLOOD PRESSURE: 76 MMHG | OXYGEN SATURATION: 99 % | TEMPERATURE: 99.1 F | BODY MASS INDEX: 41.85 KG/M2 | WEIGHT: 260.4 LBS | HEIGHT: 66 IN

## 2020-06-16 DIAGNOSIS — F32.9 MAJOR DEPRESSIVE DISORDER, REMISSION STATUS UNSPECIFIED, UNSPECIFIED WHETHER RECURRENT: ICD-10-CM

## 2020-06-16 DIAGNOSIS — E66.01 CLASS 3 SEVERE OBESITY DUE TO EXCESS CALORIES WITHOUT SERIOUS COMORBIDITY WITH BODY MASS INDEX (BMI) OF 40.0 TO 44.9 IN ADULT (HCC): ICD-10-CM

## 2020-06-16 DIAGNOSIS — Z01.419 PAP SMEAR, AS PART OF ROUTINE GYNECOLOGICAL EXAMINATION: Primary | ICD-10-CM

## 2020-06-16 DIAGNOSIS — E66.01 CLASS 3 SEVERE OBESITY DUE TO EXCESS CALORIES WITHOUT SERIOUS COMORBIDITY WITH BODY MASS INDEX (BMI) OF 40.0 TO 44.9 IN ADULT (HCC): Primary | ICD-10-CM

## 2020-06-16 DIAGNOSIS — F41.9 ANXIETY: ICD-10-CM

## 2020-06-16 DIAGNOSIS — I10 BENIGN ESSENTIAL HYPERTENSION: ICD-10-CM

## 2020-06-16 PROBLEM — E66.813 CLASS 3 SEVERE OBESITY DUE TO EXCESS CALORIES WITHOUT SERIOUS COMORBIDITY IN ADULT (HCC): Status: ACTIVE | Noted: 2020-06-16

## 2020-06-16 LAB — HCG SERPL QL: POSITIVE

## 2020-06-16 PROCEDURE — 84703 CHORIONIC GONADOTROPIN ASSAY: CPT

## 2020-06-16 PROCEDURE — 3008F BODY MASS INDEX DOCD: CPT | Performed by: PHYSICIAN ASSISTANT

## 2020-06-16 PROCEDURE — 36415 COLL VENOUS BLD VENIPUNCTURE: CPT

## 2020-06-16 PROCEDURE — 3074F SYST BP LT 130 MM HG: CPT | Performed by: PHYSICIAN ASSISTANT

## 2020-06-16 PROCEDURE — 3078F DIAST BP <80 MM HG: CPT | Performed by: PHYSICIAN ASSISTANT

## 2020-06-16 PROCEDURE — 1036F TOBACCO NON-USER: CPT | Performed by: PHYSICIAN ASSISTANT

## 2020-06-16 PROCEDURE — 99214 OFFICE O/P EST MOD 30 MIN: CPT | Performed by: PHYSICIAN ASSISTANT

## 2020-06-16 RX ORDER — MULTIVITAMIN
1 TABLET ORAL DAILY
COMMUNITY

## 2020-06-24 ENCOUNTER — HOSPITAL ENCOUNTER (EMERGENCY)
Facility: HOSPITAL | Age: 25
Discharge: HOME | End: 2020-06-24
Attending: EMERGENCY MEDICINE | Admitting: EMERGENCY MEDICINE
Payer: COMMERCIAL

## 2020-06-24 VITALS
HEIGHT: 65 IN | DIASTOLIC BLOOD PRESSURE: 100 MMHG | SYSTOLIC BLOOD PRESSURE: 157 MMHG | HEART RATE: 92 BPM | BODY MASS INDEX: 41.58 KG/M2 | TEMPERATURE: 98.3 F | OXYGEN SATURATION: 98 % | RESPIRATION RATE: 17 BRPM | WEIGHT: 249.56 LBS

## 2020-06-24 DIAGNOSIS — O03.9 MISCARRIAGE: Primary | ICD-10-CM

## 2020-06-24 DIAGNOSIS — I10 HYPERTENSION: ICD-10-CM

## 2020-06-24 LAB
ANION GAP SERPL CALCULATED.3IONS-SCNC: 7 MMOL/L (ref 4–13)
B-HCG SERPL-ACNC: 61 MIU/ML
BACTERIA UR QL AUTO: ABNORMAL /HPF
BASOPHILS # BLD AUTO: 0.06 THOUSANDS/ΜL (ref 0–0.1)
BASOPHILS NFR BLD AUTO: 1 % (ref 0–1)
BILIRUB UR QL STRIP: NEGATIVE
BUN SERPL-MCNC: 10 MG/DL (ref 5–25)
CALCIUM SERPL-MCNC: 8.6 MG/DL (ref 8.3–10.1)
CHLORIDE SERPL-SCNC: 106 MMOL/L (ref 100–108)
CLARITY UR: CLEAR
CO2 SERPL-SCNC: 25 MMOL/L (ref 21–32)
COLOR UR: YELLOW
CREAT SERPL-MCNC: 0.69 MG/DL (ref 0.6–1.3)
EOSINOPHIL # BLD AUTO: 0.25 THOUSAND/ΜL (ref 0–0.61)
EOSINOPHIL NFR BLD AUTO: 3 % (ref 0–6)
ERYTHROCYTE [DISTWIDTH] IN BLOOD BY AUTOMATED COUNT: 12.9 % (ref 11.6–15.1)
GFR SERPL CREATININE-BSD FRML MDRD: 121 ML/MIN/1.73SQ M
GLUCOSE SERPL-MCNC: 93 MG/DL (ref 65–140)
GLUCOSE UR STRIP-MCNC: NEGATIVE MG/DL
HCT VFR BLD AUTO: 39.2 % (ref 34.8–46.1)
HGB BLD-MCNC: 13.3 G/DL (ref 11.5–15.4)
HGB UR QL STRIP.AUTO: ABNORMAL
IMM GRANULOCYTES # BLD AUTO: 0.03 THOUSAND/UL (ref 0–0.2)
IMM GRANULOCYTES NFR BLD AUTO: 0 % (ref 0–2)
KETONES UR STRIP-MCNC: NEGATIVE MG/DL
LEUKOCYTE ESTERASE UR QL STRIP: NEGATIVE
LYMPHOCYTES # BLD AUTO: 2.93 THOUSANDS/ΜL (ref 0.6–4.47)
LYMPHOCYTES NFR BLD AUTO: 34 % (ref 14–44)
MCH RBC QN AUTO: 29.6 PG (ref 26.8–34.3)
MCHC RBC AUTO-ENTMCNC: 33.9 G/DL (ref 31.4–37.4)
MCV RBC AUTO: 87 FL (ref 82–98)
MONOCYTES # BLD AUTO: 0.59 THOUSAND/ΜL (ref 0.17–1.22)
MONOCYTES NFR BLD AUTO: 7 % (ref 4–12)
NEUTROPHILS # BLD AUTO: 4.8 THOUSANDS/ΜL (ref 1.85–7.62)
NEUTS SEG NFR BLD AUTO: 55 % (ref 43–75)
NITRITE UR QL STRIP: NEGATIVE
NON-SQ EPI CELLS URNS QL MICRO: ABNORMAL /HPF
NRBC BLD AUTO-RTO: 0 /100 WBCS
PH UR STRIP.AUTO: 5.5 [PH]
PLATELET # BLD AUTO: 194 THOUSANDS/UL (ref 149–390)
PMV BLD AUTO: 10.5 FL (ref 8.9–12.7)
POTASSIUM SERPL-SCNC: 3.7 MMOL/L (ref 3.5–5.3)
PROT UR STRIP-MCNC: NEGATIVE MG/DL
RBC # BLD AUTO: 4.5 MILLION/UL (ref 3.81–5.12)
RBC #/AREA URNS AUTO: ABNORMAL /HPF
SODIUM SERPL-SCNC: 138 MMOL/L (ref 136–145)
SP GR UR STRIP.AUTO: >=1.03 (ref 1–1.03)
UROBILINOGEN UR QL STRIP.AUTO: 0.2 E.U./DL
WBC # BLD AUTO: 8.66 THOUSAND/UL (ref 4.31–10.16)
WBC #/AREA URNS AUTO: ABNORMAL /HPF

## 2020-06-24 PROCEDURE — 99284 EMERGENCY DEPT VISIT MOD MDM: CPT

## 2020-06-24 PROCEDURE — 85025 COMPLETE CBC W/AUTO DIFF WBC: CPT | Performed by: EMERGENCY MEDICINE

## 2020-06-24 PROCEDURE — 36415 COLL VENOUS BLD VENIPUNCTURE: CPT | Performed by: EMERGENCY MEDICINE

## 2020-06-24 PROCEDURE — 80048 BASIC METABOLIC PNL TOTAL CA: CPT | Performed by: EMERGENCY MEDICINE

## 2020-06-24 PROCEDURE — 84702 CHORIONIC GONADOTROPIN TEST: CPT | Performed by: EMERGENCY MEDICINE

## 2020-06-24 PROCEDURE — 99284 EMERGENCY DEPT VISIT MOD MDM: CPT | Performed by: EMERGENCY MEDICINE

## 2020-06-24 PROCEDURE — 81001 URINALYSIS AUTO W/SCOPE: CPT | Performed by: EMERGENCY MEDICINE

## 2020-06-29 ENCOUNTER — HOSPITAL ENCOUNTER (EMERGENCY)
Facility: HOSPITAL | Age: 25
Discharge: HOME | End: 2020-06-29
Attending: EMERGENCY MEDICINE | Admitting: EMERGENCY MEDICINE
Payer: COMMERCIAL

## 2020-06-29 VITALS
BODY MASS INDEX: 43.66 KG/M2 | RESPIRATION RATE: 16 BRPM | HEART RATE: 97 BPM | WEIGHT: 262.35 LBS | OXYGEN SATURATION: 97 % | DIASTOLIC BLOOD PRESSURE: 83 MMHG | TEMPERATURE: 98.4 F | SYSTOLIC BLOOD PRESSURE: 162 MMHG

## 2020-06-29 DIAGNOSIS — O03.9 MISCARRIAGE: ICD-10-CM

## 2020-06-29 DIAGNOSIS — R03.0 ELEVATED BLOOD PRESSURE READING: Primary | ICD-10-CM

## 2020-06-29 LAB
BASOPHILS # BLD AUTO: 0.06 THOUSANDS/ΜL (ref 0–0.1)
BASOPHILS NFR BLD AUTO: 1 % (ref 0–1)
EOSINOPHIL # BLD AUTO: 0.24 THOUSAND/ΜL (ref 0–0.61)
EOSINOPHIL NFR BLD AUTO: 2 % (ref 0–6)
ERYTHROCYTE [DISTWIDTH] IN BLOOD BY AUTOMATED COUNT: 13 % (ref 11.6–15.1)
HCT VFR BLD AUTO: 40.5 % (ref 34.8–46.1)
HGB BLD-MCNC: 13.8 G/DL (ref 11.5–15.4)
IMM GRANULOCYTES # BLD AUTO: 0.03 THOUSAND/UL (ref 0–0.2)
IMM GRANULOCYTES NFR BLD AUTO: 0 % (ref 0–2)
LYMPHOCYTES # BLD AUTO: 2.81 THOUSANDS/ΜL (ref 0.6–4.47)
LYMPHOCYTES NFR BLD AUTO: 26 % (ref 14–44)
MCH RBC QN AUTO: 29.6 PG (ref 26.8–34.3)
MCHC RBC AUTO-ENTMCNC: 34.1 G/DL (ref 31.4–37.4)
MCV RBC AUTO: 87 FL (ref 82–98)
MONOCYTES # BLD AUTO: 0.67 THOUSAND/ΜL (ref 0.17–1.22)
MONOCYTES NFR BLD AUTO: 6 % (ref 4–12)
NEUTROPHILS # BLD AUTO: 7.17 THOUSANDS/ΜL (ref 1.85–7.62)
NEUTS SEG NFR BLD AUTO: 65 % (ref 43–75)
NRBC BLD AUTO-RTO: 0 /100 WBCS
PLATELET # BLD AUTO: 216 THOUSANDS/UL (ref 149–390)
PMV BLD AUTO: 10.2 FL (ref 8.9–12.7)
RBC # BLD AUTO: 4.66 MILLION/UL (ref 3.81–5.12)
WBC # BLD AUTO: 10.98 THOUSAND/UL (ref 4.31–10.16)

## 2020-06-29 PROCEDURE — 36415 COLL VENOUS BLD VENIPUNCTURE: CPT | Performed by: EMERGENCY MEDICINE

## 2020-06-29 PROCEDURE — 99285 EMERGENCY DEPT VISIT HI MDM: CPT | Performed by: EMERGENCY MEDICINE

## 2020-06-29 PROCEDURE — 99283 EMERGENCY DEPT VISIT LOW MDM: CPT

## 2020-06-29 PROCEDURE — 85025 COMPLETE CBC W/AUTO DIFF WBC: CPT | Performed by: EMERGENCY MEDICINE

## 2020-06-29 NOTE — DISCHARGE INSTRUCTIONS
Give yourself time to grieve the loss of your baby before you start rechecking your blood pressure  If it continues to remain high, then follow-up with your primary care doctor to discuss whether you need to start taking medications  Follow-up with your OB regarding your miscarriage, home for referrals to additional resources for help grief management  Return if he develops heavy bleeding, such that you soak through more than one pad an hour for several consecutive hours, feel like you are going to pass out, or for any other concerns

## 2020-06-29 NOTE — ED PROVIDER NOTES
History  Chief Complaint   Patient presents with    Hypertension     Patient with c/o HBP as high as 160/121 at home  Patient states she had a miscarrage over the weekend      HPI    Prior to Admission Medications   Prescriptions Last Dose Informant Patient Reported? Taking? Multiple Vitamin (MULTIVITAMIN) tablet  Self Yes No   Sig: Take 1 tablet by mouth daily   Multiple Vitamins-Calcium (ONE-A-DAY WOMENS PO)  Self Yes No   Sig: Take by mouth   loratadine (CLARITIN) 10 mg tablet  Self Yes No   Sig: Take 10 mg by mouth daily      Facility-Administered Medications: None       History reviewed  No pertinent past medical history  History reviewed  No pertinent surgical history  History reviewed  No pertinent family history  I have reviewed and agree with the history as documented  E-Cigarette/Vaping    E-Cigarette Use Never User      E-Cigarette/Vaping Substances    Nicotine No     THC No     CBD No     Flavoring No     Other No     Unknown No      Social History     Tobacco Use    Smoking status: Former Smoker    Smokeless tobacco: Never Used   Substance Use Topics    Alcohol use: Yes     Alcohol/week: 3 0 standard drinks     Types: 3 Standard drinks or equivalent per week     Frequency: 2-4 times a month     Drinks per session: 3 or 4     Binge frequency: Less than monthly     Comment: socially    Drug use: No       Review of Systems    Physical Exam  Physical Exam   Constitutional: She is oriented to person, place, and time  She appears well-developed and well-nourished  No distress  Obese  Hypertension   HENT:   Head: Normocephalic and atraumatic  Mouth/Throat: Oropharynx is clear and moist    Eyes: Pupils are equal, round, and reactive to light  Conjunctivae and EOM are normal    Neck: Normal range of motion  No tracheal deviation present  Cardiovascular: Normal rate, regular rhythm, normal heart sounds and intact distal pulses     Pulmonary/Chest: Effort normal and breath sounds normal  No respiratory distress  Abdominal: Soft  She exhibits no distension  There is no tenderness  Musculoskeletal: She exhibits no edema  Neurological: She is alert and oriented to person, place, and time  She has normal strength  No cranial nerve deficit or sensory deficit  GCS eye subscore is 4  GCS verbal subscore is 5  GCS motor subscore is 6  Skin: Skin is warm and dry  Psychiatric: Her behavior is normal    Tearful, obviously upset, intermittently crying   Nursing note and vitals reviewed        Vital Signs  ED Triage Vitals [06/29/20 0959]   Temperature Pulse Respirations Blood Pressure SpO2   98 4 °F (36 9 °C) (!) 117 18 (!) 176/110 99 %      Temp Source Heart Rate Source Patient Position - Orthostatic VS BP Location FiO2 (%)   Oral Monitor Sitting Right arm --      Pain Score       --           Vitals:    06/29/20 0959 06/29/20 1039   BP: (!) 176/110 150/92   Pulse: (!) 117 104   Patient Position - Orthostatic VS: Sitting Lying         Visual Acuity      ED Medications  Medications - No data to display    Diagnostic Studies  Results Reviewed     Procedure Component Value Units Date/Time    CBC and differential [[id number]]  (Abnormal) Collected:  06/29/20 1039    Lab Status:  Final result Specimen:  Blood from Arm, Right Updated:  06/29/20 1045     WBC 10 98 Thousand/uL      RBC 4 66 Million/uL      Hemoglobin 13 8 g/dL      Hematocrit 40 5 %      MCV 87 fL      MCH 29 6 pg      MCHC 34 1 g/dL      RDW 13 0 %      MPV 10 2 fL      Platelets 938 Thousands/uL      nRBC 0 /100 WBCs      Neutrophils Relative 65 %      Immat GRANS % 0 %      Lymphocytes Relative 26 %      Monocytes Relative 6 %      Eosinophils Relative 2 %      Basophils Relative 1 %      Neutrophils Absolute 7 17 Thousands/µL      Immature Grans Absolute 0 03 Thousand/uL      Lymphocytes Absolute 2 81 Thousands/µL      Monocytes Absolute 0 67 Thousand/µL      Eosinophils Absolute 0 24 Thousand/µL      Basophils Absolute 0 06 Thousands/µL                  No orders to display              Procedures  Procedures         ED Course       US AUDIT      Most Recent Value   Initial Alcohol Screen: US AUDIT-C    1  How often do you have a drink containing alcohol?  0 Filed at: 06/29/2020 1001   2  How many drinks containing alcohol do you have on a typical day you are drinking? 0 Filed at: 06/29/2020 1001   3a  Male UNDER 65: How often do you have five or more drinks on one occasion? 0 Filed at: 06/29/2020 1001   3b  FEMALE Any Age, or MALE 65+: How often do you have 4 or more drinks on one occassion? 0 Filed at: 06/29/2020 1001   Audit-C Score  0 Filed at: 06/29/2020 1001                  JEFF/DAST-10      Most Recent Value   How many times in the past year have you    Used an illegal drug or used a prescription medication for non-medical reasons? Never Filed at: 06/29/2020 1001                                MDM  Number of Diagnoses or Management Options  Elevated blood pressure reading: new and does not require workup  Miscarriage: new and requires workup  Diagnosis management comments: This is a 60-year-old female who presents here today with elevated blood pressure at home  She states she has intermittently had problems with her blood pressure being high  She states she checked it this morning and it was 160/121  She states she just had a miscarriage, with bleeding slightly heavier than a normal menstrual period  She did feel slightly lightheaded over the weekend without syncope  She denies any chest pain, palpitations, nausea, vomiting, diarrhea, changes in her urine, headache, vision changes, focal weakness or numbness  She was concerned regarding the degree of her blood pressure elevation  She states it has been intermittently elevated, up to 150s over 100s, but intermittently is normal   Her doctor has told her that she did not yet need to start medications for her blood pressure    She denies any abdominal cramping, significantly heavy bleeding, any other discharge  She has not taken or done anything for her symptoms, has not call her doctor  Review of systems:  Otherwise negative unless stated as above    She is well-appearing, in no acute distress  She is hypertensive here but it does improve slightly on repeat in the room  She is tearful, intermittently crying when talking about recent miscarriage, and obviously upset regarding this  Exam is otherwise unremarkable  The patient is obviously upset and grieving, and the stress and anxiety from this is likely contributing to her elevated blood pressure today  She is not having any symptoms on history or findings on exam to raise concern for hypertensive emergency and end-organ damage, for which she needs any additional workup or evaluation  Given her acute stressor, I discussed with the patient that her blood pressure will be elevated for the foreseeable future, and with her intermittent normal blood pressures the risk is greater to start her on medication at this time, than to give her time to grieve and monitor her blood pressure prior to starting medications  I discussed with her to not check her blood pressure for at least the next week if not longer, and if it continues to remain higher over time then she will need to start on medications for it  Given her brief lightheadedness in the setting of recent miscarriage we will check her hemoglobin to ensure that she is not anemic contributing to her symptoms  Hemoglobin is normal   I discussed with the patient findings, treatment at home, follow-up, including for grief support for her recent miscarriage, indications for return, and she expresses understanding with this plan         Amount and/or Complexity of Data Reviewed  Clinical lab tests: ordered and reviewed          Disposition  Final diagnoses:   Elevated blood pressure reading   Miscarriage     Time reflects when diagnosis was documented in both MDM as applicable and the Disposition within this note     Time User Action Codes Description Comment    6/29/2020 10:58 AM Vince Webb Add [R03 0] Elevated blood pressure reading     6/29/2020 10:58 AM Vince Webb Add [O03 9] Miscarriage       ED Disposition     ED Disposition Condition Date/Time Comment    Discharge Good Mon Jun 29, 2020 [address] discharge to home/self care  Follow-up Information     Follow up With Specialties Details Why Contact Info    your OB/GYN  Schedule an appointment as soon as possible for a visit  to ensure your miscarriage is complete, and for specific resources on grieving a miscarriage     Vickey Campos MD Internal Medicine Schedule an appointment as soon as possible for a visit  As needed, to follow up on your blood pressure once you get beyond the acute grieving process [address] [place] [address]  [phone]            Patient's Medications   Discharge Prescriptions    No medications on file     No discharge procedures on file      PDMP Review     None          ED Provider  Electronically Signed by           Franklyn Chawla MD  06/30/20 3698

## 2020-08-05 ENCOUNTER — HOSPITAL ENCOUNTER (EMERGENCY)
Facility: HOSPITAL | Age: 25
Discharge: HOME | End: 2020-08-05
Attending: EMERGENCY MEDICINE | Admitting: EMERGENCY MEDICINE
Payer: COMMERCIAL

## 2020-08-05 VITALS
OXYGEN SATURATION: 99 % | SYSTOLIC BLOOD PRESSURE: 169 MMHG | RESPIRATION RATE: 16 BRPM | DIASTOLIC BLOOD PRESSURE: 99 MMHG | WEIGHT: 260 LBS | HEART RATE: 80 BPM | TEMPERATURE: 98.8 F

## 2020-08-05 DIAGNOSIS — Z20.2 EXPOSURE TO SEXUALLY TRANSMITTED DISEASE (STD): Primary | ICD-10-CM

## 2020-08-05 LAB
EXT PREG TEST URINE: NEGATIVE
EXT. CONTROL ED NAV: NORMAL

## 2020-08-05 PROCEDURE — 96372 THER/PROPH/DIAG INJ SC/IM: CPT

## 2020-08-05 PROCEDURE — 81025 URINE PREGNANCY TEST: CPT | Performed by: PHYSICIAN ASSISTANT

## 2020-08-05 PROCEDURE — 87591 N.GONORRHOEAE DNA AMP PROB: CPT | Performed by: PHYSICIAN ASSISTANT

## 2020-08-05 PROCEDURE — 99283 EMERGENCY DEPT VISIT LOW MDM: CPT | Performed by: PHYSICIAN ASSISTANT

## 2020-08-05 PROCEDURE — 99283 EMERGENCY DEPT VISIT LOW MDM: CPT

## 2020-08-05 PROCEDURE — 87491 CHLMYD TRACH DNA AMP PROBE: CPT | Performed by: PHYSICIAN ASSISTANT

## 2020-08-05 RX ORDER — AZITHROMYCIN 500 MG/1
1000 TABLET, FILM COATED ORAL ONCE
Status: COMPLETED | OUTPATIENT
Start: 2020-08-05 | End: 2020-08-05

## 2020-08-05 RX ADMIN — AZITHROMYCIN 1000 MG: 500 TABLET, FILM COATED ORAL at 18:42

## 2020-08-05 RX ADMIN — LIDOCAINE HYDROCHLORIDE 250 MG: 10 INJECTION, SOLUTION EPIDURAL; INFILTRATION; INTRACAUDAL; PERINEURAL at 18:44

## 2020-08-05 NOTE — ED PROVIDER NOTES
History  Chief Complaint   Patient presents with    Exposure to STD     pt 's partner tested positive for chlamydia, pt is here to be tested, no c/o burning or discharge     30 yo here for std exposure  Reports recent partner was told he had chlamydia  At this time she has no symptoms  No abd pain  No vag bleeding or discharge  History provided by:  Patient   used: No    Exposure to STD   Location:  Chlamydia  Quality:  Partner positive  Severity:  Mild  Onset quality:  Sudden  Duration:  1 day  Timing:  Constant  Progression:  Unchanged  Chronicity:  New  Associated symptoms: no abdominal pain, no chest pain, no congestion, no cough, no diarrhea, no fatigue, no fever, no headaches, no myalgias, no nausea, no rash, no rhinorrhea, no shortness of breath, no sore throat, no vomiting and no wheezing        None       No past medical history on file  No past surgical history on file  No family history on file  I have reviewed and agree with the history as documented  No existing history information found  No existing history information found  Social History     Tobacco Use    Smoking status: Not on file   Substance Use Topics    Alcohol use: Not on file    Drug use: Not on file       Review of Systems   Constitutional: Negative for activity change, appetite change, chills, diaphoresis, fatigue, fever and unexpected weight change  HENT: Negative for congestion, rhinorrhea, sinus pressure, sore throat and trouble swallowing  Eyes: Negative for photophobia and visual disturbance  Respiratory: Negative for apnea, cough, choking, chest tightness, shortness of breath, wheezing and stridor  Cardiovascular: Negative for chest pain, palpitations and leg swelling  Gastrointestinal: Negative for abdominal distention, abdominal pain, blood in stool, constipation, diarrhea, nausea and vomiting     Genitourinary: Negative for decreased urine volume, difficulty urinating, dysuria, enuresis, flank pain, frequency, hematuria and urgency  Musculoskeletal: Negative for arthralgias, myalgias, neck pain and neck stiffness  Skin: Negative for color change, pallor, rash and wound  Allergic/Immunologic: Negative  Neurological: Negative for dizziness, tremors, syncope, weakness, light-headedness, numbness and headaches  Hematological: Negative  Psychiatric/Behavioral: Negative  All other systems reviewed and are negative  Physical Exam  Physical Exam  Vitals signs and nursing note reviewed  Constitutional:       General: She is not in acute distress  Appearance: Normal appearance  She is well-developed  She is not ill-appearing, toxic-appearing or diaphoretic  HENT:      Head: Normocephalic and atraumatic  Eyes:      General: Lids are normal       Pupils: Pupils are equal, round, and reactive to light  Neck:      Musculoskeletal: Normal range of motion and neck supple  Cardiovascular:      Rate and Rhythm: Normal rate and regular rhythm  Pulses: Normal pulses  No decreased pulses  Radial pulses are 2+ on the right side and 2+ on the left side  Heart sounds: Normal heart sounds, S1 normal and S2 normal  Heart sounds not distant  No murmur  No friction rub  No gallop  Pulmonary:      Effort: Pulmonary effort is normal  No tachypnea, bradypnea, accessory muscle usage or respiratory distress  Breath sounds: Normal breath sounds  No decreased breath sounds, wheezing, rhonchi or rales  Abdominal:      General: There is no distension  Palpations: Abdomen is soft  Abdomen is not rigid  Tenderness: There is no abdominal tenderness  There is no guarding or rebound  Musculoskeletal: Normal range of motion  General: No tenderness or deformity  Skin:     General: Skin is warm and dry  Coloration: Skin is not pale  Findings: No erythema or rash     Neurological:      Mental Status: She is alert and oriented to person, place, and time  GCS: GCS eye subscore is 4  GCS verbal subscore is 5  GCS motor subscore is 6  Cranial Nerves: No cranial nerve deficit  Psychiatric:         Speech: Speech normal          Vital Signs  ED Triage Vitals [08/05/20 1825]   Temperature Pulse Respirations Blood Pressure SpO2   98 8 °F (37 1 °C) 80 16 169/99 99 %      Temp Source Heart Rate Source Patient Position - Orthostatic VS BP Location FiO2 (%)   Oral Monitor Sitting Right arm --      Pain Score       --           Vitals:    08/05/20 1825   BP: 169/99   Pulse: 80   Patient Position - Orthostatic VS: Sitting         Visual Acuity      ED Medications  Medications   cefTRIAXone (ROCEPHIN) 250 mg in lidocaine (PF) (XYLOCAINE-MPF) 1 % IM only syringe (has no administration in time range)   azithromycin (ZITHROMAX) tablet 1,000 mg (1,000 mg Oral Given 8/5/20 1842)       Diagnostic Studies  Results Reviewed     Procedure Component Value Units Date/Time    Chlamydia/GC amplified DNA by PCR [[id number]] Collected:  08/05/20 1842    Lab Status:  No result Specimen:  Urine, Other     POCT pregnancy, urine [[id number]]  (Normal) Resulted:  08/05/20 1836    Lab Status:  Final result Updated:  08/05/20 1837     EXT PREG TEST UR (Ref: Negative) negative     Control v                 No orders to display              Procedures  Procedures         ED Course                                             MDM  Number of Diagnoses or Management Options  Exposure to sexually transmitted disease (STD): new and requires workup  Diagnosis management comments: DDx including but not limited to: UTI, interstitial cystitis, pyelonephritis, vaginitis, std         Amount and/or Complexity of Data Reviewed  Clinical lab tests: ordered    Risk of Complications, Morbidity, and/or Mortality  Presenting problems: low  Management options: low  General comments: 30 yo female here after exposure to std  Discussed treatment  Pt agreed to proceed with rocephin and azithromycin  Testing sent and is pending  Abstain from sex for 2 weeks  Recommended she discuss with partners to get them treated  Return parameters provided  Pt understands and agrees with plan  Patient Progress  Patient progress: stable        Disposition  Final diagnoses:   Exposure to sexually transmitted disease (STD)     Time reflects when diagnosis was documented in both MDM as applicable and the Disposition within this note     Time User Action Codes Description Comment    8/5/2020  6:38 PM José Miguel SKELTON Add [Z20 2] Exposure to sexually transmitted disease (STD)       ED Disposition     ED Disposition Condition Date/Time Comment    Discharge Stable Wed Aug 5, 2020  6:38  [address] discharge to home/self care  Follow-up Information     Follow up With Specialties Details Why Akira Ocasio MD Internal Medicine Call  As needed [address]  [phone]            Patient's Medications    No medications on file     No discharge procedures on file      PDMP Review     None          ED Provider  Electronically Signed by           Kasia Grande PA-C  08/05/20 7404

## 2020-08-06 LAB
C TRACH DNA SPEC QL NAA+PROBE: POSITIVE
N GONORRHOEA DNA SPEC QL NAA+PROBE: NEGATIVE

## 2020-08-06 NOTE — ED NOTES
Patient called inquiring about testing from yesterday  Made her aware testing was still in process               Hedy Baldwin RN  08/06/20 8178

## 2020-08-07 NOTE — RESULT ENCOUNTER NOTE
Spoke with patient made her aware the positive chlamydia  She was properly treated while in the emergency department or any new that she mostly had chlamydia because her significant other did she will follow-up with OBGYN    All questions were answered she verbalized understanding

## 2020-09-21 ENCOUNTER — CLINICAL SUPPORT (OUTPATIENT)
Dept: BARIATRICS | Facility: CLINIC | Age: 25
End: 2020-09-21

## 2020-09-21 VITALS
HEART RATE: 70 BPM | BODY MASS INDEX: 44.28 KG/M2 | HEIGHT: 65 IN | DIASTOLIC BLOOD PRESSURE: 98 MMHG | SYSTOLIC BLOOD PRESSURE: 140 MMHG | WEIGHT: 265.8 LBS | TEMPERATURE: 97.6 F

## 2020-09-21 DIAGNOSIS — Z98.84 BARIATRIC SURGERY STATUS: Primary | ICD-10-CM

## 2020-09-21 DIAGNOSIS — E66.01 MORBID (SEVERE) OBESITY DUE TO EXCESS CALORIES (HCC): ICD-10-CM

## 2020-09-21 PROCEDURE — RECHECK

## 2020-09-21 NOTE — PROGRESS NOTES
Bariatric Nutrition Assessment Note    Type of surgery    Preop - considering RNY  Surgery Date: TBD  Surgeon: Appointment not scheduled    Nutrition Assessment   Roxann Johnson  31 y o   female   Height: 5'5"  Initial Weight: 265 8#  BMI: 44 2  Wt with BMI of 25: 150#  Pre-Op Excess Wt:  115 8#  5% Weight Loss Goal at day of Surgery: 252 5# or lose 13 3#  1/2 to submit to insurance: 259 1# or lose 6 7#  BMI to qualify: 40  Or wt of 241#    Blood pressure 140/98, pulse 70, temperature 97 6 °F (36 4 °C), temperature source Temporal, height 5' 5" (1 651 m), weight 121 kg (265 lb 12 8 oz)  Weight History   Onset of Obesity: Childhood  Family history of obesity: No  Wt Loss Attempts: Commercial Programs ([employer], Eudora Iker, etc )  Exercise  Fasting  Meal Replacements (Medifast, Slim Fast, etc )  Nutrition Counseling with RD  OTC meds/supplements  Maximum Wt Lost: 65 on Vegan diet    Review of History and Medications   Past Medical History:   Diagnosis Date    Hypertension     [facility] of 40 0-44 9[facility]      No past surgical history on file    Social History     Socioeconomic History    Marital status: Single     Spouse name: None    Number of children: None    Years of education: None    Highest education level: None   Occupational History    None   Social Needs    Financial resource strain: None    Food insecurity     Worry: None     Inability: None    Transportation needs     Medical: None     Non-medical: None   Tobacco Use    Smoking status: Never Smoker    Smokeless tobacco: Never Used   Substance and Sexual Activity    Alcohol use: Yes     Frequency: Monthly or less     Comment: socially    Drug use: Never    Sexual activity: Yes     Partners: Male   Lifestyle    Physical activity     Days per week: 3 days     Minutes per session: 60 min    Stress: Rather much   Relationships    Social connections     Talks on phone: None     Gets together: None     Attends Congregational service: None     Active member of club or organization: None     Attends meetings of clubs or organizations: None     Relationship status: None    Intimate partner violence     Fear of current or ex partner: None     Emotionally abused: None     Physically abused: None     Forced sexual activity: None   Other Topics Concern    None   Social History Narrative    ** Merged History Encounter **            Current Outpatient Medications:     loratadine (CLARITIN) 10 mg tablet, Take 10 mg by mouth daily, Disp: , Rfl:     Multiple Vitamin (MULTIVITAMIN) tablet, Take 1 tablet by mouth daily, Disp: , Rfl:     Multiple Vitamins-Calcium (ONE-A-DAY WOMENS PO), Take by mouth, Disp: , Rfl:   Food Intake and Lifestyle Assessment   Food Intake Assessment completed via usual diet recall  Breakfast: Skip or egg white turkey cast and toast (white) Water  Snack: Sometimes granola bar, string cheese or yogurt (Greek)3   Lunch: [place] - lunchmeat 2 cheese 4 sl meat) carrots and dip or chips, water  Snack: sometimes pretzels, almonds or yogurt or a sweet (anything)  Dinner: Cheeseburger or  Chicken , mashed potatoes- usually salad; no cooked vegetables  Snack: None  Beverage intake: water, sweetened beverages(lemonade) and alcohol (socially)  No sodas, juices or diet beverages  Protein supplement: None at present; used whey powders  Estimated protein intake per day: 60-75  Estimated fluid intake per day: 120 oz - (Includes 3 Smart Water bottle 32oz each)  Meals eaten away from home: 3-4X/week KARLO - sandwich, [employer] Cheese burger, [employer]  Typical meal pattern: 2-3 meals per day and  2 snacks per day  Eating Behaviors: Consumption of high calorie/ high fat foods, Large portion sizes, Emotional eating and Craves sweet foods  Food allergies or intolerances: No Known Allergies  Cultural or Congregational considerations: None    Physical Assessment  Physical Activity  Types of exercise: Walking 2 miles 3-4X/week - stopped going to gym since Covid - cardio and weights  Current physical limitations: No  Director of  - active at work  Psychosocial Assessment   Support systems: parent(s) significant other  Socioeconomic factors: None    Nutrition Diagnosis  Diagnosis: Overweight / Obesity (NC-3 3)  Related to: Excessive energy intake  As Evidenced by: BMI >25     Nutrition Prescription: Recommend the following diet  Regular    Interventions and Teaching   Discussed pre-op and post-op nutrition guidelines  Patient educated and handouts provided  Surgical changes to stomach / GI  Capacity of post-surgery stomach  Diet progression  Adequate hydration  Sugar and fat restriction to decrease "dumping syndrome"  Fat restriction to decrease steatorrhea  Expected weight loss  Weight loss plateaus/ possibility of weight regain  Exercise  Suggestions for pre-op diet  Nutrition considerations after surgery  Protein supplements  Meal planning and preparation  Appropriate carbohydrate, protein, and fat intake, and food/fluid choices to maximize safe weight loss, nutrient intake, and tolerance   Dietary and lifestyle changes  Possible problems with poor eating habits  Intuitive eating  Techniques for self monitoring and keeping daily food journal  Potential for food intolerance after surgery, and ways to deal with them including: lactose intolerance, nausea, reflux, vomiting, diarrhea, food intolerance, appetite changes, gas  Vitamin / Mineral supplementation of Multivitamin with minerals, Calcium, Iron and Vitamin D  Post-operative pregnancy guidelines    Education provided to: patient  Barriers to learning: No barriers identified  Readiness to change: preparation  Prior research on procedure: discussed with provider - met with surgeon in [facility] 2 years ago but decided not to have surgery  Comprehension: verbalizes understanding   Expected Compliance: good    Recommendations  Pt is an appropriate candidate for surgery  Yes  Evaluation / Monitoring  Dietitian to Monitor: Eating pattern as discussed Tolerance of nutrition prescription Body weight Lab values Physical activity  Goals  Food journal, Exercise 30 minutes 5 times per week, Complete lession plans 1-6, Eat 3 meals per day and emotional eating  F/U RD next month - Weight check 1 of 6  Time Spent:   1 Hour

## 2020-09-21 NOTE — PROGRESS NOTES
Bariatric Behavioral Health Evaluation    Presenting Problem: 31year old female ( 1995) here for behavioral health evaluation  Patient is wanting to improve her health  Is the patient seeking Bariatric Surgery Eval? Yes  If yes how long have you researched this surgery option  Patient reports that she completed an evaluation with [facility] bariatric program about 3 years ago and then decided to lose weight on her own  Patient reports that she was unable to lose weight on her own and discussed this with her PCP and he referred her to this program      Realizes Post- Op Requirements? Somewhat    Pre-morbid level of function and history of present illness: Patient reports that she has been struggling with her weight "my whole life "    Psychiatric/Psychological Treatment Diagnosis: Patient denies any current mental health diagnosis or treatment  Patient educated on the benefits of outpatient therapy to the bariatric patient while going through the process of surgery, resource list provided  Outpatient Counselor No    Psychiatrist No     Have you had Inpatient Treatment? No    Domestic Violence No    Abuse History:  Patient denies this  Additional comments/stressors related to family/relationships/peer support: Patient identifies her mom and her boyfriend as her support  Patient identifies her weight and finances as somewhat of a stressor  Physical/Psychological Assessment:     Appearance: appropriate  Sociability: average  Affect: appropriate  Mood: calm  Thought Process: coherent  Speech: normal  Content: no impairment  Orientation: person  Yes , place  Yes , time  Yes , normal attention span  Yes , normal memory  Yes   and normal judgement  Yes   Insight: emotional  good    Risk Assessment:     none    Recommendations: Recommended for surgery  yes    Risk of Harm to Self or Others: Patient denies SI or HI    Observation:     Interviews This interview only      Access to weapons no     Based on the previous information, the client presents the following risk of harm to self or others: low     Note: Patient denies any current mental health diagnosis or treatment  Patient educated on the benefits of outpatient therapy to the bariatric patient while going through the process of surgery, resource list provided  Patient previously smoked cigarettes, quit about 3 months ago  Patient reports that she drinks alcohol 1x per week  Patient educated on the impact of nicotine and alcohol on the post bariatric patient  Patient meets criteria for surgery at this program  Patient's insurance requires 6 monthly weight checks  Patient will follow up with RD/STEVENSON in one month  BARIATRIC SURGERY EDUCATION CHECKLIST    I have received education related to my bariatric surgery process and understand:    Patients may be required to complete a psychiatric evaluation and receive clear ance for surgery from their psychiatrist     Patients who undergo weight loss surgery are at higher risk of increased mental health concerns and suicide attempts  Patients may be required to complete a full substance abuse evaluation and then complete all treatment recommendations prior to surgery  If diagnosis of abuse/dependence results, patient may be required to remain sober for one (1) year before having bariatric surgery  Patients on psychiatric medications should check with their provider to discuss psychiatric medications and the changes in absorption  Patient should discuss all time release medications with provider and take all medications as prescribed  The recommendation is that there is no use of  any tobacco products, Hookah or  vapes for the bariatric post-operation patient  Bariatric surgery patients should not consume alcohol as a post-operative patient as it may increase risk of numerous health conditions including but not limited to alcohol abuse and ulcers      There is a possibility of weight regain if patient does not follow all program guidelines and recommendations  Bariatric surgery patients should exercise thirty (30) to sixty (60) minutes per day to maintain post-surgical weight loss  Research indicates that bariatric patients are more successful when they see a therapist for up to two (2) years post-op  Patients will follow all medical and dietary recommendations provided  Patient will keep all scheduled appointments and follow up with their physician for a minimum of five (5) years  Patient will take all vitamins as recommended  Post-operative vitamins are life-long  Patient reviewed Bariatric Surgery Education Checklist and agrees they have received education on these issues

## 2020-10-13 DIAGNOSIS — E66.01 MORBID (SEVERE) OBESITY DUE TO EXCESS CALORIES (HCC): Primary | ICD-10-CM

## 2020-10-23 ENCOUNTER — TELEPHONE (OUTPATIENT)
Dept: BARIATRICS | Facility: CLINIC | Age: 25
End: 2020-10-23

## 2020-11-04 ENCOUNTER — TELEPHONE (OUTPATIENT)
Dept: BARIATRICS | Facility: CLINIC | Age: 25
End: 2020-11-04

## 2021-06-24 ENCOUNTER — TELEPHONE (OUTPATIENT)
Dept: INTERNAL MEDICINE CLINIC | Facility: CLINIC | Age: 26
End: 2021-06-24

## 2021-06-24 NOTE — TELEPHONE ENCOUNTER
Pt is seeing Deann Mark on Mon at 10:30AM    She has issues with her taste; meat tastes rotten, dairy tastes rotten  Issues with citrus and mint flavors     Would this have anything to do with COVID? Please let me know if it's OK for the pt to come in the office

## 2021-06-28 ENCOUNTER — OFFICE VISIT (OUTPATIENT)
Dept: INTERNAL MEDICINE CLINIC | Facility: CLINIC | Age: 26
End: 2021-06-28
Payer: COMMERCIAL

## 2021-06-28 ENCOUNTER — APPOINTMENT (OUTPATIENT)
Dept: LAB | Facility: CLINIC | Age: 26
End: 2021-06-28
Payer: COMMERCIAL

## 2021-06-28 VITALS
RESPIRATION RATE: 16 BRPM | OXYGEN SATURATION: 99 % | BODY MASS INDEX: 45.25 KG/M2 | HEIGHT: 65 IN | WEIGHT: 271.6 LBS | HEART RATE: 94 BPM | SYSTOLIC BLOOD PRESSURE: 170 MMHG | TEMPERATURE: 98.7 F | DIASTOLIC BLOOD PRESSURE: 88 MMHG

## 2021-06-28 DIAGNOSIS — R43.9 TASTE DISORDER: ICD-10-CM

## 2021-06-28 DIAGNOSIS — I10 BENIGN ESSENTIAL HYPERTENSION: ICD-10-CM

## 2021-06-28 DIAGNOSIS — I10 BENIGN ESSENTIAL HYPERTENSION: Primary | ICD-10-CM

## 2021-06-28 DIAGNOSIS — E66.01 CLASS 3 SEVERE OBESITY DUE TO EXCESS CALORIES WITHOUT SERIOUS COMORBIDITY WITH BODY MASS INDEX (BMI) OF 40.0 TO 44.9 IN ADULT (HCC): ICD-10-CM

## 2021-06-28 DIAGNOSIS — F41.9 ANXIETY: ICD-10-CM

## 2021-06-28 LAB
ALBUMIN SERPL BCP-MCNC: 3.4 G/DL (ref 3.5–5)
ALP SERPL-CCNC: 76 U/L (ref 46–116)
ALT SERPL W P-5'-P-CCNC: 34 U/L (ref 12–78)
ANION GAP SERPL CALCULATED.3IONS-SCNC: 8 MMOL/L (ref 4–13)
AST SERPL W P-5'-P-CCNC: 15 U/L (ref 5–45)
BASOPHILS # BLD AUTO: 0.06 THOUSANDS/ΜL (ref 0–0.1)
BASOPHILS NFR BLD AUTO: 1 % (ref 0–1)
BILIRUB SERPL-MCNC: 0.3 MG/DL (ref 0.2–1)
BILIRUB UR QL STRIP: NEGATIVE
BUN SERPL-MCNC: 8 MG/DL (ref 5–25)
CALCIUM ALBUM COR SERPL-MCNC: 9.5 MG/DL (ref 8.3–10.1)
CALCIUM SERPL-MCNC: 9 MG/DL (ref 8.3–10.1)
CHLORIDE SERPL-SCNC: 108 MMOL/L (ref 100–108)
CHOLEST SERPL-MCNC: 170 MG/DL (ref 50–200)
CLARITY UR: NORMAL
CO2 SERPL-SCNC: 23 MMOL/L (ref 21–32)
COLOR UR: YELLOW
CREAT SERPL-MCNC: 0.75 MG/DL (ref 0.6–1.3)
EOSINOPHIL # BLD AUTO: 0.41 THOUSAND/ΜL (ref 0–0.61)
EOSINOPHIL NFR BLD AUTO: 5 % (ref 0–6)
ERYTHROCYTE [DISTWIDTH] IN BLOOD BY AUTOMATED COUNT: 13.1 % (ref 11.6–15.1)
GFR SERPL CREATININE-BSD FRML MDRD: 110 ML/MIN/1.73SQ M
GLUCOSE P FAST SERPL-MCNC: 105 MG/DL (ref 65–99)
GLUCOSE UR STRIP-MCNC: NEGATIVE MG/DL
HCG SERPL QL: NEGATIVE
HCT VFR BLD AUTO: 42.1 % (ref 34.8–46.1)
HDLC SERPL-MCNC: 30 MG/DL
HGB BLD-MCNC: 14.1 G/DL (ref 11.5–15.4)
HGB UR QL STRIP.AUTO: NEGATIVE
IMM GRANULOCYTES # BLD AUTO: 0.03 THOUSAND/UL (ref 0–0.2)
IMM GRANULOCYTES NFR BLD AUTO: 0 % (ref 0–2)
KETONES UR STRIP-MCNC: NEGATIVE MG/DL
LDLC SERPL CALC-MCNC: 104 MG/DL (ref 0–100)
LEUKOCYTE ESTERASE UR QL STRIP: NEGATIVE
LYMPHOCYTES # BLD AUTO: 2.2 THOUSANDS/ΜL (ref 0.6–4.47)
LYMPHOCYTES NFR BLD AUTO: 29 % (ref 14–44)
MCH RBC QN AUTO: 29.5 PG (ref 26.8–34.3)
MCHC RBC AUTO-ENTMCNC: 33.5 G/DL (ref 31.4–37.4)
MCV RBC AUTO: 88 FL (ref 82–98)
MONOCYTES # BLD AUTO: 0.67 THOUSAND/ΜL (ref 0.17–1.22)
MONOCYTES NFR BLD AUTO: 9 % (ref 4–12)
NEUTROPHILS # BLD AUTO: 4.19 THOUSANDS/ΜL (ref 1.85–7.62)
NEUTS SEG NFR BLD AUTO: 56 % (ref 43–75)
NITRITE UR QL STRIP: NEGATIVE
NONHDLC SERPL-MCNC: 140 MG/DL
NRBC BLD AUTO-RTO: 0 /100 WBCS
PH UR STRIP.AUTO: 6 [PH]
PLATELET # BLD AUTO: 193 THOUSANDS/UL (ref 149–390)
PMV BLD AUTO: 11.2 FL (ref 8.9–12.7)
POTASSIUM SERPL-SCNC: 3.7 MMOL/L (ref 3.5–5.3)
PROT SERPL-MCNC: 6.8 G/DL (ref 6.4–8.2)
PROT UR STRIP-MCNC: NEGATIVE MG/DL
RBC # BLD AUTO: 4.78 MILLION/UL (ref 3.81–5.12)
SODIUM SERPL-SCNC: 139 MMOL/L (ref 136–145)
SP GR UR STRIP.AUTO: 1.02 (ref 1–1.03)
TRIGL SERPL-MCNC: 182 MG/DL
TSH SERPL DL<=0.05 MIU/L-ACNC: 2.35 UIU/ML (ref 0.36–3.74)
UROBILINOGEN UR QL STRIP.AUTO: 0.2 E.U./DL
WBC # BLD AUTO: 7.56 THOUSAND/UL (ref 4.31–10.16)

## 2021-06-28 PROCEDURE — 84443 ASSAY THYROID STIM HORMONE: CPT

## 2021-06-28 PROCEDURE — 1036F TOBACCO NON-USER: CPT | Performed by: PHYSICIAN ASSISTANT

## 2021-06-28 PROCEDURE — 99214 OFFICE O/P EST MOD 30 MIN: CPT | Performed by: PHYSICIAN ASSISTANT

## 2021-06-28 PROCEDURE — 3725F SCREEN DEPRESSION PERFORMED: CPT | Performed by: PHYSICIAN ASSISTANT

## 2021-06-28 PROCEDURE — 81003 URINALYSIS AUTO W/O SCOPE: CPT | Performed by: PHYSICIAN ASSISTANT

## 2021-06-28 PROCEDURE — 3008F BODY MASS INDEX DOCD: CPT | Performed by: PHYSICIAN ASSISTANT

## 2021-06-28 PROCEDURE — 36415 COLL VENOUS BLD VENIPUNCTURE: CPT

## 2021-06-28 PROCEDURE — 84703 CHORIONIC GONADOTROPIN ASSAY: CPT

## 2021-06-28 PROCEDURE — 80053 COMPREHEN METABOLIC PANEL: CPT

## 2021-06-28 PROCEDURE — 85025 COMPLETE CBC W/AUTO DIFF WBC: CPT

## 2021-06-28 PROCEDURE — 80061 LIPID PANEL: CPT

## 2021-06-28 RX ORDER — HYDROCHLOROTHIAZIDE 25 MG/1
25 TABLET ORAL DAILY
Qty: 90 TABLET | Refills: 5 | Status: SHIPPED | OUTPATIENT
Start: 2021-06-28 | End: 2022-04-25 | Stop reason: ALTCHOICE

## 2021-06-28 NOTE — PROGRESS NOTES
Assessment/Plan: changes in her sense of taste physical exam unremarkable will get a CT of head refer to neurology or blood pressure is elevated will treat this with diuretic and follow-up in 1 month       Diagnoses and all orders for this visit:    Benign essential hypertension  -     hydrochlorothiazide (HYDRODIURIL) 25 mg tablet; Take 1 tablet (25 mg total) by mouth daily  -     CBC and differential; Future  -     Comprehensive metabolic panel; Future  -     Lipid panel; Future  -     UA w Reflex to Microscopic w Reflex to Culture  -     TSH, 3rd generation; Future  -     Pregnancy Test (HCG Qualitative); Future    Class 3 severe obesity due to excess calories without serious comorbidity with body mass index (BMI) of 40 0 to 44 9 in adult (HCC)  -     hydrochlorothiazide (HYDRODIURIL) 25 mg tablet; Take 1 tablet (25 mg total) by mouth daily  -     CBC and differential; Future  -     Comprehensive metabolic panel; Future  -     Lipid panel; Future  -     UA w Reflex to Microscopic w Reflex to Culture  -     TSH, 3rd generation; Future  -     Pregnancy Test (HCG Qualitative); Future    Anxiety    Taste disorder  -     CT head wo contrast; Future  -     Ambulatory referral to Neurology; Future        No problem-specific Assessment & Plan notes found for this encounter  Subjective:      Patient ID: Ronda Badillo is a 31 y o  female  She noticed a change in her sense of taste 3 weeks ago this started fairly abruptly  Citrus and mint taste funny  Meat and dairy tace rotten  No nausea vomiting food intolerance or reflux no problems with her teeth  No new recent medications or vitamins no fever chills skin rashes arthritis no history of COVID-19  Appetite weight is stable    History of hypertension which has so far not been treated otherwise she is normally active and well      The following portions of the patient's history were reviewed and updated as appropriate:   She has a past medical history of Hypertension and Morbid obesity with BMI of 40 0-44 9, adult ([facility] )  ,  does not have any pertinent problems on file  ,   has no past surgical history on file  ,  family history includes Hypertension in her mother  ,   reports that she has never smoked  She has never used smokeless tobacco  She reports current alcohol use  She reports that she does not use drugs  ,  has No Known Allergies     Current Outpatient Medications   Medication Sig Dispense Refill    loratadine (CLARITIN) 10 mg tablet Take 10 mg by mouth daily      Multiple Vitamin (MULTIVITAMIN) tablet Take 1 tablet by mouth daily      hydrochlorothiazide (HYDRODIURIL) 25 mg tablet Take 1 tablet (25 mg total) by mouth daily 90 tablet 5    Multiple Vitamins-Calcium (ONE-A-DAY WOMENS PO) Take by mouth       No current facility-administered medications for this visit  Review of Systems   Constitutional: Negative for chills and fever  HENT: Negative for ear pain and sore throat  Eyes: Negative for pain and visual disturbance  Respiratory: Negative for cough and shortness of breath  Cardiovascular: Negative for chest pain and palpitations  Gastrointestinal: Negative for abdominal pain and vomiting  Genitourinary: Negative for dysuria and hematuria  Musculoskeletal: Negative for arthralgias and back pain  Skin: Negative for color change and rash  Neurological: Negative for seizures and syncope  All other systems reviewed and are negative  Objective:  Vitals:    06/28/21 1028   BP: 170/88   BP Location: Left arm   Patient Position: Sitting   Cuff Size: Extra-Large   Pulse: 94   Resp: 16   Temp: 98 7 °F (37 1 °C)   TempSrc: Tympanic   SpO2: 99%   Weight: 123 kg (271 lb 9 6 oz)   Height: 5' 5" (1 651 m)     Body mass index is 45 2 kg/m²  Physical Exam  Vitals reviewed  Constitutional:       Appearance: She is well-developed  She is obese  HENT:      Head: Normocephalic        Right Ear: Tympanic membrane and external ear normal       Left Ear: Tympanic membrane and external ear normal       Nose: Nose normal    Eyes:      Extraocular Movements: Extraocular movements intact  Conjunctiva/sclera: Conjunctivae normal       Pupils: Pupils are equal, round, and reactive to light  Neck:      Thyroid: No thyromegaly  Cardiovascular:      Rate and Rhythm: Normal rate and regular rhythm  Pulses: Normal pulses  Heart sounds: Normal heart sounds  No murmur heard  Pulmonary:      Effort: Pulmonary effort is normal  No respiratory distress  Breath sounds: Normal breath sounds  No stridor  No wheezing or rhonchi  Abdominal:      General: Abdomen is flat  Bowel sounds are normal  There is no distension  Palpations: Abdomen is soft  There is no mass  Tenderness: There is no abdominal tenderness  Hernia: No hernia is present  Musculoskeletal:         General: No swelling  Normal range of motion  Cervical back: Normal range of motion and neck supple  Skin:     General: Skin is warm and dry  Neurological:      General: No focal deficit present  Mental Status: She is alert and oriented to person, place, and time  Cranial Nerves: No cranial nerve deficit  Sensory: No sensory deficit  Motor: No weakness  Coordination: Coordination normal       Gait: Gait normal       Deep Tendon Reflexes: Reflexes are normal and symmetric  Psychiatric:         Mood and Affect: Mood normal          Thought Content:  Thought content normal          Judgment: Judgment normal

## 2021-12-22 ENCOUNTER — VBI (OUTPATIENT)
Dept: ADMINISTRATIVE | Facility: OTHER | Age: 26
End: 2021-12-22

## 2022-04-01 ENCOUNTER — OFFICE VISIT (OUTPATIENT)
Dept: URGENT CARE | Facility: CLINIC | Age: 27
End: 2022-04-01
Payer: COMMERCIAL

## 2022-04-01 VITALS — HEART RATE: 83 BPM | TEMPERATURE: 97.4 F | RESPIRATION RATE: 18 BRPM | OXYGEN SATURATION: 98 %

## 2022-04-01 DIAGNOSIS — K52.9 AGE (ACUTE GASTROENTERITIS): Primary | ICD-10-CM

## 2022-04-01 PROCEDURE — 99213 OFFICE O/P EST LOW 20 MIN: CPT | Performed by: PHYSICIAN ASSISTANT

## 2022-04-01 NOTE — PROGRESS NOTES
[facility]    NAME: Hoa Valente is a 31 y o  female  : 1995    MRN: [id number]  DATE: 2022  TIME: 1:26 PM    Assessment and Plan   AGE (acute gastroenteritis) [K52 9]  1  AGE (acute gastroenteritis)         Patient Instructions     Patient Instructions   Stay hydrated by taking small sips of water through out the day  Avoid large amounts of water at one time  Advance diet as tolerated starting with crackers, toast   Can use imodium for diarrhea  If you are unable to hold down fluids and feel dehydrated, go to the emergency room  Can take tylenol or ibuprofen as needed for headache, pain, fever  Probiotics which can be found over the counter in pill form or in yogurt, such as activia, would be beneficial to increase normal gut maria victoria and help with diarrhea  If symptoms worsen go to the emergency room  Chief Complaint     Chief Complaint   Patient presents with    Diarrhea     Diarrhea, fever, and light headed since yesterday  History of Present Illness   71-year-old female here with complaint diarrhea, decreased appetite and fever since yesterday  Did not have a fever today go  No nausea or vomiting  No upper respiratory complaints  Review of Systems   Review of Systems   Constitutional: Positive for fatigue and fever  Negative for activity change, appetite change and chills  Respiratory: Negative for cough  Cardiovascular: Negative for chest pain  Gastrointestinal: Positive for diarrhea  Negative for abdominal pain, constipation, nausea and vomiting  Genitourinary: Negative for difficulty urinating, dysuria, flank pain, frequency, hematuria and urgency  Musculoskeletal: Negative for back pain and myalgias  All other systems reviewed and are negative        Current Medications     Current Outpatient Medications:     hydrochlorothiazide (HYDRODIURIL) 25 mg tablet, Take 1 tablet (25 mg total) by mouth daily (Patient not taking: Reported on 4/1/2022 ), Disp: 90 tablet, Rfl: 5    loratadine (CLARITIN) 10 mg tablet, Take 10 mg by mouth daily, Disp: , Rfl:     Multiple Vitamin (MULTIVITAMIN) tablet, Take 1 tablet by mouth daily, Disp: , Rfl:     Multiple Vitamins-Calcium (ONE-A-DAY WOMENS PO), Take by mouth, Disp: , Rfl:     Current Allergies     Allergies as of 04/01/2022    (No Known Allergies)          The following portions of the patient's history were reviewed and updated as appropriate: allergies, current medications, past family history, past medical history, past social history, past surgical history and problem list    Past Medical History:   Diagnosis Date    Hypertension     Morbid obesity with BMI of 40 0-44 9, adult ([facility] [address] )      No past surgical history on file  Family History   Problem Relation Age of Onset    Hypertension Mother      Social History     Socioeconomic History    Marital status: Single     Spouse name: Not on file    Number of children: Not on file    Years of education: Not on file    Highest education level: Not on file   Occupational History    Not on file   Tobacco Use    Smoking status: Never Smoker    Smokeless tobacco: Never Used   Vaping Use    Vaping Use: Former    Quit date: 6/2/2020   Substance and Sexual Activity    Alcohol use: Yes     Comment: socially    Drug use: Never    Sexual activity: Yes     Partners: Male   Other Topics Concern    Not on file   Social History Narrative    ** Merged History Encounter **          Social Determinants of Health     Financial Resource Strain: Not on file   Food Insecurity: Not on file   Transportation Needs: Not on file   Physical Activity: Not on file   Stress: Not on file   Social Connections: Not on file   Intimate Partner Violence: Not on file   Housing Stability: Not on file     Medications have been verified      Objective   Pulse 83   Temp (!) 97 4 °F (36 3 °C)   Resp 18   SpO2 98%      Physical Exam   Physical Exam  Vitals and nursing note reviewed  Constitutional:       General: She is not in acute distress  Appearance: Normal appearance  She is well-developed  HENT:      Head: Normocephalic and atraumatic  Cardiovascular:      Rate and Rhythm: Normal rate and regular rhythm  Heart sounds: Normal heart sounds  No murmur heard  Pulmonary:      Effort: Pulmonary effort is normal  No respiratory distress  Breath sounds: Normal breath sounds  Abdominal:      General: Bowel sounds are normal       Tenderness: There is no abdominal tenderness

## 2022-04-01 NOTE — PATIENT INSTRUCTIONS
Stay hydrated by taking small sips of water through out the day  Avoid large amounts of water at one time  Advance diet as tolerated starting with crackers, toast   Can use imodium for diarrhea  If you are unable to hold down fluids and feel dehydrated, go to the emergency room  Can take tylenol or ibuprofen as needed for headache, pain, fever  Probiotics which can be found over the counter in pill form or in yogurt, such as activia, would be beneficial to increase normal gut maria victoria and help with diarrhea  If symptoms worsen go to the emergency room

## 2022-04-01 NOTE — LETTER
April 1, 2022     Patient: Kalina Johnson   YOB: 1995   Date of Visit: 4/1/2022       To Whom It May Concern: It is my medical opinion that Dez Royal may return to work on 4/4/2022  If you have any questions or concerns, please don't hesitate to call  Sincerely,        Maura Thomas PA-C    CC: Roxann Aldrich

## 2022-04-25 ENCOUNTER — TELEPHONE (OUTPATIENT)
Dept: ADMINISTRATIVE | Facility: OTHER | Age: 27
End: 2022-04-25

## 2022-04-25 ENCOUNTER — APPOINTMENT (OUTPATIENT)
Dept: LAB | Facility: CLINIC | Age: 27
End: 2022-04-25
Payer: COMMERCIAL

## 2022-04-25 ENCOUNTER — OFFICE VISIT (OUTPATIENT)
Dept: INTERNAL MEDICINE CLINIC | Facility: CLINIC | Age: 27
End: 2022-04-25
Payer: COMMERCIAL

## 2022-04-25 VITALS
BODY MASS INDEX: 45.62 KG/M2 | HEART RATE: 79 BPM | OXYGEN SATURATION: 98 % | RESPIRATION RATE: 18 BRPM | WEIGHT: 273.8 LBS | SYSTOLIC BLOOD PRESSURE: 140 MMHG | DIASTOLIC BLOOD PRESSURE: 82 MMHG | TEMPERATURE: 97.6 F | HEIGHT: 65 IN

## 2022-04-25 DIAGNOSIS — Z11.59 ENCOUNTER FOR HEPATITIS C SCREENING TEST FOR LOW RISK PATIENT: ICD-10-CM

## 2022-04-25 DIAGNOSIS — Z23 ENCOUNTER FOR IMMUNIZATION: ICD-10-CM

## 2022-04-25 DIAGNOSIS — E66.01 MORBID OBESITY WITH BMI OF 40.0-44.9, ADULT (HCC): ICD-10-CM

## 2022-04-25 DIAGNOSIS — Z00.00 ANNUAL PHYSICAL EXAM: ICD-10-CM

## 2022-04-25 DIAGNOSIS — Z11.1 SCREENING-PULMONARY TB: ICD-10-CM

## 2022-04-25 DIAGNOSIS — Z11.4 ENCOUNTER FOR SCREENING FOR HIV: ICD-10-CM

## 2022-04-25 DIAGNOSIS — I10 PRIMARY HYPERTENSION: Primary | ICD-10-CM

## 2022-04-25 LAB
ANION GAP SERPL CALCULATED.3IONS-SCNC: 4 MMOL/L (ref 4–13)
BASOPHILS # BLD AUTO: 0.08 THOUSANDS/ΜL (ref 0–0.1)
BASOPHILS NFR BLD AUTO: 1 % (ref 0–1)
BUN SERPL-MCNC: 8 MG/DL (ref 5–25)
CALCIUM SERPL-MCNC: 9.2 MG/DL (ref 8.3–10.1)
CHLORIDE SERPL-SCNC: 109 MMOL/L (ref 100–108)
CHOLEST SERPL-MCNC: 153 MG/DL
CO2 SERPL-SCNC: 26 MMOL/L (ref 21–32)
CREAT SERPL-MCNC: 0.74 MG/DL (ref 0.6–1.3)
EOSINOPHIL # BLD AUTO: 0.31 THOUSAND/ΜL (ref 0–0.61)
EOSINOPHIL NFR BLD AUTO: 4 % (ref 0–6)
ERYTHROCYTE [DISTWIDTH] IN BLOOD BY AUTOMATED COUNT: 13.5 % (ref 11.6–15.1)
EST. AVERAGE GLUCOSE BLD GHB EST-MCNC: 105 MG/DL
GFR SERPL CREATININE-BSD FRML MDRD: 111 ML/MIN/1.73SQ M
GLUCOSE P FAST SERPL-MCNC: 98 MG/DL (ref 65–99)
HBA1C MFR BLD: 5.3 %
HCT VFR BLD AUTO: 39.8 % (ref 34.8–46.1)
HCV AB SER QL: NORMAL
HDLC SERPL-MCNC: 27 MG/DL
HGB BLD-MCNC: 13.5 G/DL (ref 11.5–15.4)
IMM GRANULOCYTES # BLD AUTO: 0.04 THOUSAND/UL (ref 0–0.2)
IMM GRANULOCYTES NFR BLD AUTO: 1 % (ref 0–2)
LDLC SERPL CALC-MCNC: 96 MG/DL (ref 0–100)
LYMPHOCYTES # BLD AUTO: 2.8 THOUSANDS/ΜL (ref 0.6–4.47)
LYMPHOCYTES NFR BLD AUTO: 35 % (ref 14–44)
MCH RBC QN AUTO: 29.3 PG (ref 26.8–34.3)
MCHC RBC AUTO-ENTMCNC: 33.9 G/DL (ref 31.4–37.4)
MCV RBC AUTO: 86 FL (ref 82–98)
MONOCYTES # BLD AUTO: 0.47 THOUSAND/ΜL (ref 0.17–1.22)
MONOCYTES NFR BLD AUTO: 6 % (ref 4–12)
NEUTROPHILS # BLD AUTO: 4.42 THOUSANDS/ΜL (ref 1.85–7.62)
NEUTS SEG NFR BLD AUTO: 53 % (ref 43–75)
NRBC BLD AUTO-RTO: 0 /100 WBCS
PLATELET # BLD AUTO: 200 THOUSANDS/UL (ref 149–390)
PMV BLD AUTO: 10.6 FL (ref 8.9–12.7)
POTASSIUM SERPL-SCNC: 4.1 MMOL/L (ref 3.5–5.3)
RBC # BLD AUTO: 4.61 MILLION/UL (ref 3.81–5.12)
SODIUM SERPL-SCNC: 139 MMOL/L (ref 136–145)
TRIGL SERPL-MCNC: 148 MG/DL
TSH SERPL DL<=0.05 MIU/L-ACNC: 3.65 UIU/ML (ref 0.45–4.5)
WBC # BLD AUTO: 8.12 THOUSAND/UL (ref 4.31–10.16)

## 2022-04-25 PROCEDURE — 86480 TB TEST CELL IMMUN MEASURE: CPT

## 2022-04-25 PROCEDURE — 87389 HIV-1 AG W/HIV-1&-2 AB AG IA: CPT

## 2022-04-25 PROCEDURE — 99395 PREV VISIT EST AGE 18-39: CPT | Performed by: NURSE PRACTITIONER

## 2022-04-25 PROCEDURE — 90715 TDAP VACCINE 7 YRS/> IM: CPT | Performed by: NURSE PRACTITIONER

## 2022-04-25 PROCEDURE — 83036 HEMOGLOBIN GLYCOSYLATED A1C: CPT

## 2022-04-25 PROCEDURE — 84443 ASSAY THYROID STIM HORMONE: CPT

## 2022-04-25 PROCEDURE — 86803 HEPATITIS C AB TEST: CPT

## 2022-04-25 PROCEDURE — 85025 COMPLETE CBC W/AUTO DIFF WBC: CPT

## 2022-04-25 PROCEDURE — 80048 BASIC METABOLIC PNL TOTAL CA: CPT

## 2022-04-25 PROCEDURE — 90471 IMMUNIZATION ADMIN: CPT | Performed by: NURSE PRACTITIONER

## 2022-04-25 PROCEDURE — 80061 LIPID PANEL: CPT

## 2022-04-25 PROCEDURE — 36415 COLL VENOUS BLD VENIPUNCTURE: CPT

## 2022-04-25 RX ORDER — CHLORTHALIDONE 25 MG/1
25 TABLET ORAL DAILY
Qty: 90 TABLET | Refills: 3 | Status: SHIPPED | OUTPATIENT
Start: 2022-04-25

## 2022-04-25 NOTE — ASSESSMENT & PLAN NOTE
The patient's blood pressure in the office today is 140/82  The patient had been on hydrochlorothiazide in the past however stopped this medication on her own several months ago  I did recommend to the patient that she go back on a medication  Chlorthalidone was sent to her pharmacy  Side effects were discussed  In addition lifestyle modifications were discussed with the patient    Exercise and weight loss were recommended

## 2022-04-25 NOTE — ASSESSMENT & PLAN NOTE
Patient's BMI in the office today is 39  Information regarding lifestyle modifications was provided to the patient  Weight loss is recommended

## 2022-04-25 NOTE — PATIENT INSTRUCTIONS
Wellness Visit for Adults   AMBULATORY CARE:   A wellness visit  is when you see your healthcare provider to get screened for health problems  Your healthcare provider will also give you advice on how to stay healthy  Write down your questions so you remember to ask them  Ask your healthcare provider how often you should have a wellness visit  What happens at a wellness visit:  Your healthcare provider will ask about your health, and your family history of health problems  This includes high blood pressure, heart disease, and cancer  He or she will ask if you have symptoms that concern you, if you smoke, and about your mood  You may also be asked about your intake of medicines, supplements, food, and alcohol  Any of the following may be done:  · Your weight  will be checked  Your height may also be checked so your body mass index (BMI) can be calculated  Your BMI shows if you are at a healthy weight  · Your blood pressure  and heart rate will be checked  Your temperature may also be checked  · Blood and urine tests  may be done  Blood tests may be done to check your cholesterol levels  Abnormal cholesterol levels increase your risk for heart disease and stroke  You may also need a blood or urine test to check for diabetes if you are at increased risk  Urine tests may be done to look for signs of an infection or kidney disease  · A physical exam  includes checking your heartbeat and lungs with a stethoscope  Your healthcare provider may also check your skin to look for sun damage  · Screening tests  may be recommended  A screening test is done to check for diseases that may not cause symptoms  The screening tests you may need depend on your age, gender, family history, and lifestyle habits  For example, colorectal screening may be recommended if you are 48years old or older  Screening tests you need if you are a woman:   · A Pap smear  is used to screen for cervical cancer   Pap smears are usually done every 3 to 5 years depending on your age  You may need them more often if you have had abnormal Pap smear test results in the past  Ask your healthcare provider how often you should have a Pap smear  · A mammogram  is an x-ray of your breasts to screen for breast cancer  Experts recommend mammograms every 2 years starting at age 48 years  You may need a mammogram at age 52 years or younger if you have an increased risk for breast cancer  Talk to your healthcare provider about when you should start having mammograms and how often you need them  Vaccines you may need:   · Get an influenza vaccine  every year  The influenza vaccine protects you from the flu  Several types of viruses cause the flu  The viruses change over time, so new vaccines are made each year  · Get a tetanus-diphtheria (Td) booster vaccine  every 10 years  This vaccine protects you against tetanus and diphtheria  Tetanus is a severe infection that may cause painful muscle spasms and lockjaw  Diphtheria is a severe bacterial infection that causes a thick covering in the back of your mouth and throat  · Get a human papillomavirus (HPV) vaccine  if you are female and aged 23 to 32 or male 23 to 24 and never received it  This vaccine protects you from HPV infection  HPV is the most common infection spread by sexual contact  HPV may also cause vaginal, penile, and anal cancers  · Get a pneumococcal vaccine  if you are aged 72 years or older  The pneumococcal vaccine is an injection given to protect you from pneumococcal disease  Pneumococcal disease is an infection caused by pneumococcal bacteria  The infection may cause pneumonia, meningitis, or an ear infection  · Get a shingles vaccine  if you are 60 or older, even if you have had shingles before  The shingles vaccine is an injection to protect you from the varicella-zoster virus  This is the same virus that causes chickenpox   Shingles is a painful rash that develops in people who had chickenpox or have been exposed to the virus  How to eat healthy:  My Plate is a model for planning healthy meals  It shows the types and amounts of foods that should go on your plate  Fruits and vegetables make up about half of your plate, and grains and protein make up the other half  A serving of dairy is included on the side of your plate  The amount of calories and serving sizes you need depends on your age, gender, weight, and height  Examples of healthy foods are listed below:  · Eat a variety of vegetables  such as dark green, red, and orange vegetables  You can also include canned vegetables low in sodium (salt) and frozen vegetables without added butter or sauces  · Eat a variety of fresh fruits , canned fruit in 100% juice, frozen fruit, and dried fruit  · Include whole grains  At least half of the grains you eat should be whole grains  Examples include whole-wheat bread, wheat pasta, brown rice, and whole-grain cereals such as oatmeal     · Eat a variety of protein foods such as seafood (fish and shellfish), lean meat, and poultry without skin (turkey and chicken)  Examples of lean meats include pork leg, shoulder, or tenderloin, and beef round, sirloin, tenderloin, and extra lean ground beef  Other protein foods include eggs and egg substitutes, beans, peas, soy products, nuts, and seeds  · Choose low-fat dairy products such as skim or 1% milk or low-fat yogurt, cheese, and cottage cheese  · Limit unhealthy fats  such as butter, hard margarine, and shortening  Exercise:  Exercise at least 30 minutes per day on most days of the week  Some examples of exercise include walking, biking, dancing, and swimming  You can also fit in more physical activity by taking the stairs instead of the elevator or parking farther away from stores  Include muscle strengthening activities 2 days each week  Regular exercise provides many health benefits   It helps you manage your weight, and decreases your risk for type 2 diabetes, heart disease, stroke, and high blood pressure  Exercise can also help improve your mood  Ask your healthcare provider about the best exercise plan for you  General health and safety guidelines:   · Do not smoke  Nicotine and other chemicals in cigarettes and cigars can cause lung damage  Ask your healthcare provider for information if you currently smoke and need help to quit  E-cigarettes or smokeless tobacco still contain nicotine  Talk to your healthcare provider before you use these products  · Limit alcohol  A drink of alcohol is 12 ounces of beer, 5 ounces of wine, or 1½ ounces of liquor  · Lose weight, if needed  Being overweight increases your risk of certain health conditions  These include heart disease, high blood pressure, type 2 diabetes, and certain types of cancer  · Protect your skin  Do not sunbathe or use tanning beds  Use sunscreen with a SPF 15 or higher  Apply sunscreen at least 15 minutes before you go outside  Reapply sunscreen every 2 hours  Wear protective clothing, hats, and sunglasses when you are outside  · Drive safely  Always wear your seatbelt  Make sure everyone in your car wears a seatbelt  A seatbelt can save your life if you are in an accident  Do not use your cell phone when you are driving  This could distract you and cause an accident  Pull over if you need to make a call or send a text message  · Practice safe sex  Use latex condoms if are sexually active and have more than one partner  Your healthcare provider may recommend screening tests for sexually transmitted infections (STIs)  · Wear helmets, lifejackets, and protective gear  Always wear a helmet when you ride a bike or motorcycle, go skiing, or play sports that could cause a head injury  Wear protective equipment when you play sports  Wear a lifejacket when you are on a boat or doing water sports      © Copyright [employer] 2022 Information is for End User's use only and may not be sold, redistributed or otherwise used for commercial purposes  All illustrations and images included in CareNotes® are the copyrighted property of [employer] A M [employer]  or Marleni Gonzalez  The above information is an  only  It is not intended as medical advice for individual conditions or treatments  Talk to your doctor, nurse or pharmacist before following any medical regimen to see if it is safe and effective for you  Weight Management   AMBULATORY CARE:   Why it is important to manage your weight:  Being overweight increases your risk of health conditions such as heart disease, high blood pressure, type 2 diabetes, and certain types of cancer  It can also increase your risk for osteoarthritis, sleep apnea, and other respiratory problems  Aim for a slow, steady weight loss  Even a small amount of weight loss can lower your risk of health problems  Risks of being overweight:  Extra weight can cause many health problems, including the following:  · Diabetes (high blood sugar level)    · High blood pressure or high cholesterol    · Heart disease    · Stroke    · Gallbladder or liver disease    · Cancer of the colon, breast, prostate, liver, or kidney    · Sleep apnea    · Arthritis or gout    Screening  is done to check for health conditions before you have signs or symptoms  If you are 28to 79years old, your blood sugar level may be checked every 3 years for signs of prediabetes or diabetes  Your healthcare provider will check your blood pressure at each visit  High blood pressure can lead to a stroke or other problems  Your provider may check for signs of heart disease, cancer, or other health problems  How to lose weight safely:  A safe and healthy way to lose weight is to eat fewer calories and get regular exercise  · You can lose up about 1 pound a week by decreasing the number of calories you eat by 500 calories each day   You can decrease calories by eating smaller portion sizes or by cutting out high-calorie foods  Read labels to find out how many calories are in the foods you eat  · You can also burn calories with exercise such as walking, swimming, or biking  You will be more likely to keep weight off if you make these changes part of your lifestyle  Exercise at least 30 minutes per day on most days of the week  You can also fit in more physical activity by taking the stairs instead of the elevator or parking farther away from stores  Ask your healthcare provider about the best exercise plan for you  Healthy meal plan for weight management:  A healthy meal plan includes a variety of foods, contains fewer calories, and helps you stay healthy  A healthy meal plan includes the following:     · Eat whole-grain foods more often  A healthy meal plan should contain fiber  Fiber is the part of grains, fruits, and vegetables that is not broken down by your body  Whole-grain foods are healthy and provide extra fiber in your diet  Some examples of whole-grain foods are whole-wheat breads and pastas, oatmeal, brown rice, and bulgur  · Eat a variety of vegetables every day  Include dark, leafy greens such as spinach, kale, javy greens, and mustard greens  Eat yellow and orange vegetables such as carrots, sweet potatoes, and winter squash  · Eat a variety of fruits every day  Choose fresh or canned fruit (canned in its own juice or light syrup) instead of juice  Fruit juice has very little or no fiber  · Eat low-fat dairy foods  Drink fat-free (skim) milk or 1% milk  Eat fat-free yogurt and low-fat cottage cheese  Try low-fat cheeses such as mozzarella and other reduced-fat cheeses  · Choose meat and other protein foods that are low in fat  Choose beans or other legumes such as split peas or lentils  Choose fish, skinless poultry (chicken or turkey), or lean cuts of red meat (beef or pork)   Before you cook meat or poultry, cut off any visible fat  · Use less fat and oil  Try baking foods instead of frying them  Add less fat, such as margarine, sour cream, regular salad dressing and mayonnaise to foods  Eat fewer high-fat foods  Some examples of high-fat foods include french fries, doughnuts, ice cream, and cakes  · Eat fewer sweets  Limit foods and drinks that are high in sugar  This includes candy, cookies, regular soda, and sweetened drinks  Ways to decrease calories:   · Eat smaller portions  ? Use a small plate with smaller servings  ? Do not eat second helpings  ? When you eat at a restaurant, ask for a box and place half of your meal in the box before you eat  ? Share an entrée with someone else  · Replace high-calorie snacks with healthy, low-calorie snacks  ? Choose fresh fruit, vegetables, fat-free rice cakes, or air-popped popcorn instead of potato chips, nuts, or chocolate  ? Choose water or calorie-free drinks instead of soda or sweetened drinks  · Do not shop for groceries when you are hungry  You may be more likely to make unhealthy food choices  Take a grocery list of healthy foods and shop after you have eaten  · Eat regular meals  Do not skip meals  Skipping meals can lead to overeating later in the day  This can make it harder for you to lose weight  Eat a healthy snack in place of a meal if you do not have time to eat a regular meal  Talk with a dietitian to help you create a meal plan and schedule that is right for you  Other things to consider as you try to lose weight:   · Be aware of situations that may give you the urge to overeat, such as eating while watching television  Find ways to avoid these situations  For example, read a book, go for a walk, or do crafts  · Meet with a weight loss support group or friends who are also trying to lose weight  This may help you stay motivated to continue working on your weight loss goals      © Copyright [employer] 2022 Information is for End User's use only and may not be sold, redistributed or otherwise used for commercial purposes  All illustrations and images included in CareNotes® are the copyrighted property of A [employer] A M [employer] Inc  or Marleni Gonzalez  The above information is an  only  It is not intended as medical advice for individual conditions or treatments  Talk to your doctor, nurse or pharmacist before following any medical regimen to see if it is safe and effective for you  Obesity   AMBULATORY CARE:   Obesity  means your body mass index (BMI) is greater than 30  Your healthcare provider will use your height and weight to measure your BMI  The risks of obesity include  many health problems, including injuries or physical disability  · Diabetes (high blood sugar level)    · High blood pressure or high cholesterol    · Heart disease    · Stroke    · Gallbladder or liver disease    · Cancer of the colon, breast, prostate, liver, or kidney    · Sleep apnea    · Arthritis or gout    Screening  is done to check for health conditions before you have signs or symptoms  If you are 28to 79years old, your blood sugar level may be checked every 3 years for signs of prediabetes or diabetes  Your healthcare provider will check your blood pressure at each visit  High blood pressure can lead to a stroke or other problems  Your provider may check for signs of heart disease, cancer, or other health problems  Seek care immediately if:   · You have a severe headache, confusion, or difficulty speaking  · You have weakness on one side of your body  · You have chest pain, sweating, or shortness of breath  Call your doctor if:   · You have symptoms of gallbladder or liver disease, such as pain in your upper abdomen  · You have knee or hip pain and discomfort while walking  · You have symptoms of diabetes, such as intense hunger and thirst, and frequent urination      · You have symptoms of sleep apnea, such as snoring or daytime sleepiness  · You have questions or concerns about your condition or care  Treatment for obesity  focuses on helping you lose weight to improve your health  Even a small decrease in BMI can reduce the risk for many health problems  Your healthcare provider will help you set a weight-loss goal   · Lifestyle changes  are the first step in treating obesity  These include making healthy food choices and getting regular physical activity  Your healthcare provider may suggest a weight-loss program that involves coaching, education, and therapy  · Medicine  may help you lose weight when it is used with a healthy foods and physical activity  · Surgery  can help you lose weight if you are very obese and have other health problems  There are several types of weight-loss surgery  Ask your healthcare provider for more information  Tips for safe weight loss:   · Set small, realistic goals  An example of a small goal is to walk for 20 minutes 5 days a week  Anther goal is to lose 5% of your body weight  · Tell friends, family members, and coworkers about your goals  and ask for their support  Ask a friend to lose weight with you, or join a weight-loss support group  · Identify foods or triggers that may cause you to overeat , and find ways to avoid them  Remove tempting high-calorie foods from your home and workplace  Place a bowl of fresh fruit on your kitchen counter  If stress causes you to eat, then find other ways to cope with stress  A counselor or therapist may be able to help you  · Keep a diary to track what you eat and drink  Also write down how many minutes of physical activity you do each day  Weigh yourself once a week and record it in your diary  Eating changes: You will need to eat 500 to 1,000 fewer calories each day than you currently eat to lose 1 to 2 pounds a week  The following changes will help you cut calories:  · Eat smaller portions    Use small plates, no larger than 9 inches in diameter  Fill your plate half full of fruits and vegetables  Measure your food using measuring cups until you know what a serving size looks like  · Eat 3 meals and 1 or 2 snacks each day  Plan your meals in advance  Terry Reyna and eat at home most of the time  Eat slowly  Do not skip meals  Skipping meals can lead to overeating later in the day  This can make it harder for you to lose weight  Talk with a dietitian to help you make a meal plan and schedule that is right for you  · Eat fruits and vegetables at every meal   They are low in calories and high in fiber, which makes you feel full  Do not add butter, margarine, or cream sauce to vegetables  Use herbs to season steamed vegetables  · Eat less fat and fewer fried foods  Eat more baked or grilled chicken and fish  These protein sources are lower in calories and fat than red meat  Limit fast food  Dress your salads with olive oil and vinegar instead of bottled dressing  · Limit the amount of sugar you eat  Do not drink sugary beverages  Limit alcohol  Activity changes:  Physical activity is good for your body in many ways  It helps you burn calories and build strong muscles  It decreases stress and depression, and improves your mood  It can also help you sleep better  Talk to your healthcare provider before you begin an exercise program   · Exercise for at least 30 minutes 5 days a week  Start slowly  Set aside time each day for physical activity that you enjoy and that is convenient for you  It is best to do both weight training and an activity that increases your heart rate, such as walking, bicycling, or swimming  · Find ways to be more active  Do yard work and housecleaning  Walk up the stairs instead of using elevators  Spend your leisure time going to events that require walking, such as outdoor festivals or fairs  This extra physical activity can help you lose weight and keep it off         Follow up with your doctor as directed: You may need to meet with a dietitian  Write down your questions so you remember to ask them during your visits  © Copyright [employer] 2022 Information is for End User's use only and may not be sold, redistributed or otherwise used for commercial purposes  All illustrations and images included in CareNotes® are the copyrighted property of [employer]  or Marleni Fuller   The above information is an  only  It is not intended as medical advice for individual conditions or treatments  Talk to your doctor, nurse or pharmacist before following any medical regimen to see if it is safe and effective for you  Cholesterol and Your Health   AMBULATORY CARE:   Cholesterol  is a waxy, fat-like substance  Your body uses cholesterol to make hormones and new cells, and to protect nerves  Cholesterol is made by your body  It also comes from certain foods you eat, such as meat and dairy products  Your healthcare provider can help you set goals for your cholesterol levels  He or she can help you create a plan to meet your goals  Cholesterol level goals: Your cholesterol level goals depend on your risk for heart disease, your age, and your other health conditions  The following are general guidelines:  · Total cholesterol  includes low-density lipoprotein (LDL), high-density lipoprotein (HDL), and triglyceride levels  The total cholesterol level should be lower than 200 mg/dL and is best at about 150 mg/dL  · LDL cholesterol  is called bad cholesterol  because it forms plaque in your arteries  As plaque builds up, your arteries become narrow, and less blood flows through  When plaque decreases blood flow to your heart, you may have chest pain  If plaque completely blocks an artery that brings blood to your heart, you may have a heart attack  Plaque can break off and form blood clots  Blood clots may block arteries in your brain and cause a stroke   The level should be less than 130 mg/dL and is best at about 100 mg/dL  · HDL cholesterol  is called good cholesterol  because it helps remove LDL cholesterol from your arteries  It does this by attaching to LDL cholesterol and carrying it to your liver  Your liver breaks down LDL cholesterol so your body can get rid of it  High levels of HDL cholesterol can help prevent a heart attack and stroke  Low levels of HDL cholesterol can increase your risk for heart disease, heart attack, and stroke  The level should be 60 mg/dL or higher  · Triglycerides  are a type of fat that store energy from foods you eat  High levels of triglycerides also cause plaque buildup  This can increase your risk for a heart attack or stroke  If your triglyceride level is high, your LDL cholesterol level may also be high  The level should be less than 150 mg/dL  Any of the following can increase your risk for high cholesterol:   · Smoking cigarettes    · Being overweight or obese, or not getting enough exercise    · Drinking large amounts of alcohol    · A medical condition such as hypertension (high blood pressure) or diabetes    · Certain genes passed from your parents to you    · Age older than 65 years    What you need to know about having your cholesterol levels checked: Adults 21to 39years of age should have their cholesterol levels checked every 4 to 6 years  Adults 45 years or older should have their cholesterol checked every 1 to 2 years  You may need your cholesterol checked more often, or at a younger age, if you have risk factors for heart disease  You may also need to have your cholesterol checked more often if you have other health conditions, such as diabetes  Blood tests are used to check cholesterol levels  Blood tests measure your levels of triglycerides, LDL cholesterol, and HDL cholesterol  How healthy fats affect your cholesterol levels:  Healthy fats, also called unsaturated fats, help lower LDL cholesterol and triglyceride levels  Healthy fats include the following:  · Monounsaturated fats  are found in foods such as olive oil, canola oil, avocado, nuts, and olives  · Polyunsaturated fats,  such as omega 3 fats, are found in fish, such as salmon, trout, and tuna  They can also be found in plant foods such as flaxseed, walnuts, and soybeans  How unhealthy fats affect your cholesterol levels:  Unhealthy fats increase LDL cholesterol and triglyceride levels  They are found in foods high in cholesterol, saturated fat, and trans fat:  · Cholesterol  is found in eggs, dairy, and meat  · Saturated fat  is found in butter, cheese, ice cream, whole milk, and coconut oil  Saturated fat is also found in meat, such as sausage, hot dogs, and bologna  · Trans fat  is found in liquid oils and is used in fried and baked foods  Foods that contain trans fats include chips, crackers, muffins, sweet rolls, microwave popcorn, and cookies  Treatment  for high cholesterol will also decrease your risk of heart disease, heart attack, and stroke  Treatment may include any of the following:  · Lifestyle changes  may include food, exercise, weight loss, and quitting smoking  You may also need to decrease the amount of alcohol you drink  Your healthcare provider will want you to start with lifestyle changes  Other treatment may be added if lifestyle changes are not enough  Your healthcare provider may recommend you work with a team to manage hyperlipidemia  The team may include medical experts such as a dietitian, an exercise or physical therapist, and a behavior therapist  Your family members may be included in helping you create lifestyle changes  · Medicines  may be given to lower your LDL cholesterol, triglyceride levels, or total cholesterol level  You may need medicines to lower your cholesterol if any of the following is true:    ? You have a history of stroke, TIA, unstable angina, or a heart attack  ?  Your LDL cholesterol level is 190 mg/dL or higher  ? You are age 36 to 76 years, have diabetes or heart disease risk factors, and your LDL cholesterol is 70 mg/dL or higher  · Supplements  include fish oil, red yeast rice, and garlic  Fish oil may help lower your triglyceride and LDL cholesterol levels  It may also increase your HDL cholesterol level  Red yeast rice may help decrease your total cholesterol level and LDL cholesterol level  Garlic may help lower your total cholesterol level  Do not take any supplements without talking to your healthcare provider  Food changes you can make to lower your cholesterol levels:  A dietitian can help you create a healthy eating plan  He or she can show you how to read food labels and choose foods low in saturated fat, trans fats, and cholesterol  · Decrease the total amount of fat you eat  Choose lean meats, fat-free or 1% fat milk, and low-fat dairy products, such as yogurt and cheese  Try to limit or avoid red meats  Limit or do not eat fried foods or baked goods, such as cookies  · Replace unhealthy fats with healthy fats  Cook foods in olive oil or canola oil  Choose soft margarines that are low in saturated fat and trans fat  Seeds, nuts, and avocados are other examples of healthy fats  · Eat foods with omega-3 fats  Examples include salmon, tuna, mackerel, walnuts, and flaxseed  Eat fish 2 times per week  Pregnant women should not eat fish that have high levels of mercury, such as shark, swordfish, and krish mackerel  · Increase the amount of high-fiber foods you eat  High-fiber foods can help lower your LDL cholesterol  Aim to get between 20 and 30 grams of fiber each day  Fruits and vegetables are high in fiber  Eat at least 5 servings each day  Other high-fiber foods are whole-grain or whole-wheat breads, pastas, or cereals, and brown rice  Eat 3 ounces of whole-grain foods each day  Increase fiber slowly   You may have abdominal discomfort, bloating, and gas if you add fiber to your diet too quickly  · Eat healthy protein foods  Examples include low-fat dairy products, skinless chicken and turkey, fish, and nuts  · Limit foods and drinks that are high in sugar  Your dietitian or healthcare provider can help you create daily limits for high-sugar foods and drinks  The limit may be lower if you have diabetes or another health condition  Limits can also help you lose weight if needed  Lifestyle changes you can make to lower your cholesterol levels:   · Maintain a healthy weight  Ask your healthcare provider what a healthy weight is for you  Ask him or her to help you create a weight loss plan if needed  Weight loss can decrease your total cholesterol and triglyceride levels  Weight loss may also help keep your blood pressure at a healthy level  · Be physically active throughout the day  Physical activity, such as exercise, can help lower your total cholesterol level and maintain a healthy weight  Physical activity can also help increase your HDL cholesterol level  Work with your healthcare provider to create an program that is right for you  Get at least 30 to 40 minutes of moderate physical activity most days of the week  Examples of exercise include brisk walking, swimming, or biking  Also include strength training at least 2 times each week  Your healthcare providers can help you create a physical activity plan  · Do not smoke  Nicotine and other chemicals in cigarettes and cigars can raise your cholesterol levels  Ask your healthcare provider for information if you currently smoke and need help to quit  E-cigarettes or smokeless tobacco still contain nicotine  Talk to your healthcare provider before you use these products  · Limit or do not drink alcohol  Alcohol can increase your triglyceride levels  Ask your healthcare provider before you drink alcohol  Ask how much is okay for you to drink in 24 hours or 1 week      Follow up with your doctor as directed:  Write down your questions so you remember to ask them during your visits  © Copyright [employer] 2022 Information is for End User's use only and may not be sold, redistributed or otherwise used for commercial purposes  All illustrations and images included in CareNotes® are the copyrighted property of [employer]  or Marleni Gonzalez  The above information is an  only  It is not intended as medical advice for individual conditions or treatments  Talk to your doctor, nurse or pharmacist before following any medical regimen to see if it is safe and effective for you

## 2022-04-25 NOTE — PROGRESS NOTES
ADULT ANNUAL [address]     NAME: Leslie Johnson  AGE: 31 y o  SEX: female  : 1995     DATE: 2022     Assessment and Plan:     Problem List Items Addressed This Visit        Cardiovascular and Mediastinum    Primary hypertension - Primary      The patient's blood pressure in the office today is 140/82  The patient had been on hydrochlorothiazide in the past however stopped this medication on her own several months ago  I did recommend to the patient that she go back on a medication  Chlorthalidone was sent to her pharmacy  Side effects were discussed  In addition lifestyle modifications were discussed with the patient  Exercise and weight loss were recommended         Relevant Medications    chlorthalidone 25 mg tablet       Other    Morbid [facility] with BMI of 40 0-44 9, [facility]      Patient's BMI in the office today is 39  Information regarding lifestyle modifications was provided to the patient  Weight loss is recommended  Other Visit Diagnoses     Encounter for immunization        Relevant Orders    TDAP VACCINE GREATER THAN OR EQUAL TO 8YO IM (Completed)    Encounter for hepatitis C screening test for low risk patient        Relevant Orders    Hepatitis C antibody    Encounter for screening for HIV        Relevant Orders    HIV 1/2 Antigen/Antibody (4th Generation) w Reflex [facility]    Screening-pulmonary TB        Relevant Orders    Quantiferon TB Gold Plus    Annual physical exam        Relevant Orders    CBC and differential    Basic metabolic panel    TSH, 3rd generation with Free T4 reflex    HEMOGLOBIN A1C W/ EAG ESTIMATION    Lipid Panel with Direct LDL reflex          Immunizations and preventive care screenings were discussed with patient today  Appropriate education was printed on patient's after visit summary      Counseling:  Alcohol/drug use: discussed moderation in alcohol intake, the recommendations for healthy alcohol use, and avoidance of illicit drug use  Dental Health: discussed importance of regular tooth brushing, flossing, and dental visits  Injury prevention: discussed safety/seat belts, safety helmets, smoke detectors, carbon dioxide detectors, and smoking near bedding or upholstery  Sexual health: discussed sexually transmitted diseases, partner selection, use of condoms, avoidance of unintended pregnancy, and contraceptive alternatives  · Exercise: the importance of regular exercise/physical activity was discussed  Recommend exercise 3-5 times per week for at least 30 minutes  BMI Counseling: Body mass index is 45 56 kg/m²  The BMI is above normal  Nutrition recommendations include decreasing portion sizes, encouraging healthy choices of fruits and vegetables, decreasing fast food intake, consuming healthier snacks, limiting drinks that contain sugar, moderation in carbohydrate intake, increasing intake of lean protein, reducing intake of saturated and trans fat and reducing intake of cholesterol  Exercise recommendations include moderate physical activity 150 minutes/week and exercising 3-5 times per week  Rationale for BMI follow-up plan is due to patient being overweight or obese  Depression Screening and Follow-up Plan: Patient was screened for depression during today's encounter  They screened negative with a PHQ-2 score of 0  No follow-ups on file  Chief Complaint:     Chief Complaint   Patient presents with    Establish Care      History of Present Illness:     Adult Annual Physical   Patient here for a comprehensive physical exam  The patient reports no problems  Diet and Physical Activity  · Diet/Nutrition: poor diet, frequent junk food and limited fruits/vegetables  · Exercise: no formal exercise        Depression Screening  PHQ-2/9 Depression Screening    Little interest or pleasure in doing things: 0 - not at all  Feeling down, depressed, or hopeless: 0 - not at all  PHQ-2 Score: 0  PHQ-2 Interpretation: Negative depression screen       General Health  · Sleep: sleeps poorly and gets 7-8 hours of sleep on average  · Hearing: normal - bilateral   · Vision: most recent eye exam <1 year ago and wears glasses  · Dental: no dental visits for >1 year and brushes teeth twice daily  /GYN Health  · Last menstrual period: 4/23/2022  · Contraceptive method: none  · History of STDs?: no      Review of Systems:     Review of Systems   Constitutional: Negative for activity change, fatigue and fever  HENT: Negative for congestion, hearing loss, rhinorrhea, trouble swallowing and voice change  Eyes: Negative for photophobia, pain, discharge and visual disturbance  Respiratory: Negative for cough, chest tightness and shortness of breath  Cardiovascular: Negative for chest pain, palpitations and leg swelling  Gastrointestinal: Negative for abdominal pain, blood in stool, constipation, nausea and vomiting  Endocrine: Negative for cold intolerance and heat intolerance  Genitourinary: Negative for difficulty urinating, frequency, hematuria, urgency, vaginal bleeding and vaginal discharge  Musculoskeletal: Negative for arthralgias and myalgias  Skin: Negative  Neurological: Negative for dizziness, weakness, numbness and headaches  Psychiatric/Behavioral: Negative for decreased concentration  The patient is not nervous/anxious  Past Medical History:     Past Medical History:   Diagnosis Date    Hypertension     Morbid obesity with BMI of 40 0-44 9, adult [facility])       Past Surgical History:     History reviewed  No pertinent surgical history     Social History:     Social History     Socioeconomic History    Marital status: Single     Spouse name: None    Number of children: None    Years of education: None    Highest education level: None   Occupational History    None   Tobacco Use    Smoking status: Never Smoker    Smokeless tobacco: Never Used   Vaping Use    Vaping Use: Former    Quit date: 6/2/2020   Substance and Sexual Activity    Alcohol use: Yes     Comment: socially    Drug use: Never    Sexual activity: Yes     Partners: Male   Other Topics Concern    None   Social History Narrative    ** Merged History Encounter **          Social Determinants of Health     Financial Resource Strain: Not on file   Food Insecurity: Not on file   Transportation Needs: Not on file   Physical Activity: Not on file   Stress: No Stress Concern Present    Feeling of Stress : Not at all   Social Connections: Not on file   Intimate Partner Violence: Not on file   Housing Stability: Not on file      Family History:     Family History   Problem Relation Age of Onset    Hypertension Mother       Current Medications:     Current Outpatient Medications   Medication Sig Dispense Refill    Multiple Vitamin (MULTIVITAMIN) tablet Take 1 tablet by mouth daily      chlorthalidone 25 mg tablet Take 1 tablet (25 mg total) by mouth daily 90 tablet 3     No current facility-administered medications for this visit  Allergies:     No Known Allergies   Physical Exam:     /82 (BP Location: Left arm, Patient Position: Sitting, Cuff Size: Standard)   Pulse 79   Temp 97 6 °F (36 4 °C) (Temporal) Comment: no nsaids  Resp 18   Ht 5' 5" (1 651 m)   Wt 124 kg (273 lb 12 8 oz)   SpO2 98%   BMI 45 56 kg/m²     Current Outpatient Medications   Medication Instructions    chlorthalidone 25 mg, Oral, Daily    Multiple Vitamin (MULTIVITAMIN) tablet 1 tablet, Oral, Daily         Physical Exam  Vitals and nursing note reviewed  Constitutional:       General: She is not in acute distress  Appearance: Normal appearance  She is obese  HENT:      Head: Normocephalic and atraumatic  Right Ear: Tympanic membrane, ear canal and external ear normal  There is no impacted cerumen        Left Ear: Tympanic membrane, ear canal and external ear normal  There is no impacted cerumen  Nose: Nose normal       Mouth/Throat:      Mouth: Mucous membranes are moist       Pharynx: Oropharynx is clear  No posterior oropharyngeal erythema  Eyes:      General:         Right eye: No discharge  Left eye: No discharge  Extraocular Movements: Extraocular movements intact  Pupils: Pupils are equal, round, and reactive to light  Cardiovascular:      Rate and Rhythm: Normal rate and regular rhythm  Pulses: Normal pulses  Heart sounds: Normal heart sounds  No murmur heard  Pulmonary:      Effort: Pulmonary effort is normal       Breath sounds: Normal breath sounds  Abdominal:      General: Bowel sounds are normal       Palpations: Abdomen is soft  Musculoskeletal:         General: Normal range of motion  Cervical back: Normal range of motion  Skin:     General: Skin is warm and dry  Capillary Refill: Capillary refill takes less than 2 seconds  Neurological:      General: No focal deficit present  Mental Status: She is alert and oriented to person, place, and time  Psychiatric:         Mood and Affect: Mood normal          Behavior: Behavior normal          Thought Content:  Thought content normal          Judgment: Judgment normal           Jamie Carlsonu, [address] [place] [address],[address]

## 2022-04-25 NOTE — TELEPHONE ENCOUNTER
----- Message from Michael March sent at 4/25/2022 11:00 AM EDT -----  Regarding: HIV / Hep C  04/25/22 11:01 AM    Hello, our patient Ed Moraes has had Hepatitis C and HIV completed/performed  Please assist in updating the patient chart by pulling the Care Everywhere (CE) document  The date of service is 07/30/2015        Thank you,  Minoo Marrero MA  [facility]  [address]

## 2022-04-26 LAB — HIV 1+2 AB+HIV1 P24 AG SERPL QL IA: NORMAL

## 2022-04-26 NOTE — TELEPHONE ENCOUNTER
Upon review of the In Basket request we were able to locate, review, and update the patient chart as requested for Hepatitis C  and HIV  Any additional questions or concerns should be emailed to the Practice Liaisons via [email or web address] email, please do not reply via In Basket      Thank you  Sean Figueroa MA

## 2022-04-27 ENCOUNTER — TELEPHONE (OUTPATIENT)
Dept: INTERNAL MEDICINE CLINIC | Facility: CLINIC | Age: 27
End: 2022-04-27

## 2022-04-27 LAB
GAMMA INTERFERON BACKGROUND BLD IA-ACNC: 0.01 IU/ML
M TB IFN-G BLD-IMP: NEGATIVE
M TB IFN-G CD4+ BCKGRND COR BLD-ACNC: 0 IU/ML
M TB IFN-G CD4+ BCKGRND COR BLD-ACNC: 0 IU/ML
MITOGEN IGNF BCKGRD COR BLD-ACNC: >10 IU/ML

## 2022-04-27 NOTE — TELEPHONE ENCOUNTER
----- Message from Shelly Magaña, [address]  sent at 4/27/2022 12:36 PM EDT -----    Please call the patient and make her aware her blood work is within normal limits  In addition her form is complete and ready for her to be picked up

## 2023-01-10 ENCOUNTER — VBI (OUTPATIENT)
Dept: ADMINISTRATIVE | Facility: OTHER | Age: 28
End: 2023-01-10

## 2023-01-18 ENCOUNTER — OFFICE VISIT (OUTPATIENT)
Dept: BARIATRICS | Facility: CLINIC | Age: 28
End: 2023-01-18

## 2023-01-18 VITALS
WEIGHT: 277.9 LBS | HEART RATE: 110 BPM | SYSTOLIC BLOOD PRESSURE: 152 MMHG | RESPIRATION RATE: 18 BRPM | BODY MASS INDEX: 46.3 KG/M2 | DIASTOLIC BLOOD PRESSURE: 90 MMHG | HEIGHT: 65 IN

## 2023-01-18 DIAGNOSIS — E66.01 MORBID OBESITY WITH BMI OF 40.0-44.9, ADULT (HCC): Primary | ICD-10-CM

## 2023-01-18 DIAGNOSIS — N92.6 IRREGULAR PERIODS: ICD-10-CM

## 2023-01-18 DIAGNOSIS — I10 PRIMARY HYPERTENSION: ICD-10-CM

## 2023-01-18 NOTE — PROGRESS NOTES
Assessment/Plan:  Marlene Hunt was seen today for consult  Diagnoses and all orders for this visit:    Morbid obesity with BMI of 40 0-44 9, adult [facility])  -     Ambulatory referral to Sleep Medicine; Future  Ref to bariatric surgeon   1500kcal diet      Primary hypertension  -     Ambulatory referral to Sleep Medicine; Future    Irregular periods     Barrier to weight loss,   Diet plan discussed , with 10% weight loss good chances to have better ovarian fc  Component Ref Range & Units 4/25/22  1:49 PM 6/11/20 10:40 AM   Hemoglobin A1C Normal 3 8-5 6%; PreDiabetic 5 7-6 4%; Diabetic >=6 5%; Glycemic control for adults with diabetes <7 0% % 5 3  5 4      Component Ref Range & Units 4/25/22  1:49 PM 6/28/21 11:03 AM 6/11/20 10:40 AM   TSH 3RD GENERATON 0 450 - 4 500 uIU/mL 3 650  2 350           Obesity:   Weight not at goal and patient tried more than 6 months to lose weight and was not able to achieve a meaningful weight loss above 5%  - Discussed options of HealthyCORE-Intensive Lifestyle Intervention Program, Very Low Calorie Diet-VLCD, Conservative Program, Jalil-En-Y Gastric Bypass and Vertical Sleeve Gastrectomy and the role of weight loss medications  - Patient is interested in pursuing Conservative Program, Jalil-En-Y Gastric Bypass and Vertical Sleeve Gastrectomy  - Initial weight loss goal of 5-10% weight loss for improved health  - Weight loss can improve patient's co-morbid conditions and/or prevent weight-related complications  Nutrition prescription:  • Motivational interview performed and patient noted changes to work on until next visit  • Hydration: 64oz fluid, no sugary drinks  • Goal 3 meals per day  • Food log (ie ) www [email or web address] com,baritastic, lose it or preferred connor  • Increase physical activity by 10 minutes daily  Patient denies personal and family history of  pancreatitis, thyroid cancer, MEN-2 tumors  Denies any hx of glaucoma, seizures, kidney stones, gallstones    Denies Hx of CAD, PAD, palpitations, arrhythmia  Denies uncontrolled anxiety or depression, suicidal behavior or thinking , insomnia or sleep disturbance  Total time spent: 30 min, with >50% face-to-face time spent counseling patient on nonsurgical interventions for the treatment of excess weight  Discussed in detail nonsurgical options including intensive lifestyle intervention program, very low-calorie diet program and conservative program   Discussed the role of weight loss medications  Counseled patient on diet behavior and exercise modification for weight loss  Return with surgeon    Subjective:   Chief Complaint   Patient presents with   • Consult     Initial Consult with Inez Ospina  S/B = 4/8       Patient ID: Renan Jones  is a 28 y o  female with excess weight/obesity here to pursue weight management  Previous notes and records have been reviewed          HPI  Wt Readings from Last 20 Encounters:   01/18/23 126 kg (277 lb 14 4 oz)   04/25/22 124 kg (273 lb 12 8 oz)   06/28/21 123 kg (271 lb 9 6 oz)   09/21/20 121 kg (265 lb 12 8 oz)   08/05/20 118 kg (260 lb)   06/29/20 119 kg (262 lb 5 6 oz)   06/24/20 113 kg (249 lb 9 oz)   06/16/20 118 kg (260 lb 6 4 oz)   06/05/20 119 kg (261 lb 6 4 oz)   01/08/20 118 kg (261 lb)   01/11/19 126 kg (276 lb 10 8 oz)   10/27/17 104 kg (230 lb)   08/07/17 115 kg (254 lb 8 oz)   08/07/17 115 kg (254 lb 4 9 oz)   08/07/17 115 kg (254 lb 8 oz)   07/28/17 115 kg (254 lb 2 oz)   07/10/17 112 kg (246 lb 6 oz)   06/03/17 109 kg (240 lb)   06/02/17 109 kg (241 lb)   04/18/17 107 kg (236 lb 2 oz)     Used vegan diet lost 60lbs but after stopping regained   B- skips  S-9 am turkey cast or bagel or bowl of cereal  L-sandwich or soup or yogurt and granola bar  S-  D-meat potato or rice  Side salad  S-sweet   Hydration: water stopped soda 2 mo ago , has cranberry juice   Alcohol: 1-2 times a mo  Smoking:no  Exercise:not much, has a puppy tries to walk around the community  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, she is the director  Sleep:wakes up a lot during night time  STOP ban/8    Past Medical History:   Diagnosis Date   • Hypertension    • Morbid obesity with BMI of 40 0-44 9, adult ([facility] )      History reviewed  No pertinent surgical history  The following portions of the patient's history were reviewed and updated as appropriate: allergies, current medications, past family history, past medical history, past social history, past surgical history, and problem list     ROS:  Review of Systems   Constitutional: Negative for activity change  Fatigue  HENT: Negative for trouble swallowing  Respiratory: Negative for shortness of breath  Cardiovascular: Negative for chest pain, edema  Gastrointestinal: Negative for abdominal pain, nausea and vomiting, acid reflux, constipation/diarrhea  Endocrine: negative for heat /cold intolerance  Genitourinary: Negative for difficulty urinating  Musculoskeletal: Negative for gait problem and myalgias  Psychiatric/Behavioral: Negative for behavioral problems including anxiety /depression  Objective:  /90 (BP Location: Left arm, Patient Position: Sitting, Cuff Size: Large)   Pulse (!) 110   Resp 18   Ht 5' 5 25" (1 657 m)   Wt 126 kg (277 lb 14 4 oz)   BMI 45 89 kg/m²   Constitutional: Well-developed, well-nourished and Obese Body mass index is 45 89 kg/m²  Mallorie Blight Alert, cooperative  HEENT: No conjunctival injection  No thyroid masses  Pulmonary: No increased work of breathing or signs of respiratory distress  Clear respiratory sounds  CV: Well-perfused, Regular rate and rhythm, no murmurs  Vascular: no peripheral edema  GI: Abdomen obese, Non-distended  Not tender  MSK: no sarcopenia noted   Neuro: Oriented to person, place and time  Normal Speech  Normal gait  Psych: Normal affect and mood  Normal thought process, no delusions     Labs and Imaging  Recent labs and imaging have been personally reviewed    Lab Results   Component Value Date    WBC 8 12 04/25/2022    HGB 13 5 04/25/2022    HCT 39 8 04/25/2022    MCV 86 04/25/2022     04/25/2022     Lab Results   Component Value Date    SODIUM 139 04/25/2022    K 4 1 04/25/2022     (H) 04/25/2022    CO2 26 04/25/2022    AGAP 4 04/25/2022    BUN 8 04/25/2022    CREATININE 0 74 04/25/2022    GLUC 93 06/24/2020    GLUF 98 04/25/2022    CALCIUM 9 2 04/25/2022    AST 15 06/28/2021    ALT 34 06/28/2021    ALKPHOS 76 06/28/2021    TP 6 8 06/28/2021    TBILI 0 30 06/28/2021    EGFR 111 04/25/2022     Lab Results   Component Value Date    HGBA1C 5 3 04/25/2022     Lab Results   Component Value Date    EVS6GCQBCLCE 3 650 04/25/2022     Lab Results   Component Value Date    CHOLESTEROL 153 04/25/2022     Lab Results   Component Value Date    HDL 27 (L) 04/25/2022     Lab Results   Component Value Date    TRIG 148 04/25/2022     Lab Results   Component Value Date    LDLCALC 96 04/25/2022

## 2023-02-08 ENCOUNTER — OFFICE VISIT (OUTPATIENT)
Dept: BARIATRICS | Facility: CLINIC | Age: 28
End: 2023-02-08

## 2023-02-08 VITALS
BODY MASS INDEX: 47.22 KG/M2 | DIASTOLIC BLOOD PRESSURE: 94 MMHG | SYSTOLIC BLOOD PRESSURE: 168 MMHG | WEIGHT: 283.4 LBS | HEART RATE: 94 BPM | HEIGHT: 65 IN | RESPIRATION RATE: 16 BRPM

## 2023-02-08 DIAGNOSIS — E66.01 MORBID OBESITY (HCC): Primary | ICD-10-CM

## 2023-02-08 DIAGNOSIS — I10 PRIMARY HYPERTENSION: ICD-10-CM

## 2023-02-08 NOTE — LETTER
February 8, 2023     Tressa Reina MD  [address] [address]    Patient: Alberto Tovar   YOB: 1995   Date of Visit: 2/8/2023       Dear Dr Corby Lucas: Thank you for referring Power Barrett to me for evaluation for bariatric surgery  Below are my notes for this consultation  If you have questions, please do not hesitate to call me on my cell phone at [phone] or via [facility] Text  I look forward to following your patient along with you  Sincerely,    Carroll Coffman MD, Hedy Goltz, [facility]  Metabolic & Bariatric Surgery Director  [facility] - [facility]   2/8/2023  12:09 PM        CC: MD Carol Miller MD  2/8/2023 12:08 PM  Sign when Signing Visit      [address] [address] [employer] [facility] Alex 28 y o  female MRN: [id number]  Unit/Bed#:  Encounter: [id number]      HPI:  Alberto Tovar is a very pleasant 28 y o  female who presents with a longstanding history of morbid obesity and inability to sustain a meaningful weight loss  Here today to discuss bariatric options  She is a  worker   Body mass index is 46 8 kg/m²  ++Suffers from irregular menstrual cycles, HTN, RUBINA suspected      Visit type: consultation     Symptoms: excess weight, weight increase, inability to loss weight and fatigue    Associated Symptoms: none    Associated Conditions: sleep apnea and abdominal obesity  Disease Complications: hypertension and sleep apnea  Weight Loss Interest: high  Previous Diet Trials: low carb    Exercise Frequency:infrequency  Types of Exercise: walking      Review of Systems   Constitutional: Negative  Respiratory: Negative  Cardiovascular: Negative  Gastrointestinal: Negative  Musculoskeletal: Negative  Neurological: Negative  All other systems reviewed and are negative        Historical Information   Past Medical History:   Diagnosis Date   • Hypertension    • Morbid obesity with BMI of 40 0-44 9, adult [facility])      History reviewed  No pertinent surgical history  Social History   Social History     Substance and Sexual Activity   Alcohol Use Yes    Comment: socially     Social History     Substance and Sexual Activity   Drug Use Never     Social History     Tobacco Use   Smoking Status Never   Smokeless Tobacco Never     Family History:   Family History   Problem Relation Age of Onset   • Hypertension Mother        Meds/Allergies    all medications and allergies reviewed  No Known Allergies    Objective        Current Vitals:   /94 (BP Location: Left arm, Patient Position: Sitting, Cuff Size: Large)   Pulse 94   Resp 16   Ht 5' 5 25" (1 657 m)   Wt 129 kg (283 lb 6 4 oz)   BMI 46 80 kg/m²       Physical Exam  Vitals reviewed  Constitutional:       Appearance: She is well-developed  HENT:      Head: Normocephalic  Eyes:      Extraocular Movements: Extraocular movements intact  Cardiovascular:      Rate and Rhythm: Normal rate  Pulmonary:      Effort: Pulmonary effort is normal    Abdominal:      General: There is no distension  Musculoskeletal:         General: Normal range of motion  Cervical back: Normal range of motion  Neurological:      Mental Status: She is alert and oriented to person, place, and time  Psychiatric:         Mood and Affect: Mood normal          Behavior: Behavior normal          Thought Content: Thought content normal          Judgment: Judgment normal          Lab Results: I have personally reviewed pertinent lab results  Imaging: I have personally reviewed pertinent reports  EKG, Pathology, and Other Studies: I have personally reviewed pertinent reports  Assessment/PLAN:    28 y o  yo female with a long standing h/o of obesity and inability to sustain any meaningful weight loss on her own despite several attempts  She is interested in the Laparoscopic renetta-en-y gastric bypass vs sleeve gastrectomy      I have discussed with the patient that a percentage of patient's may experience new or worsened GERD and 18% risk of Carlin's esophagitis requiring surveillance EGDs after a sleeve gastrectomy  The patient understands this fully and accepts the risks should she undergo a sleeve gastrectomy  ++Suffers from irregular menstrual cycles, HTN, RUBINA suspected    I have explained our Enhanced Recovery After Bariatric Surgery (ERABS) protocol and benefits including preoperative, intraoperative and postoperative elements  As a part of his pre op process, she will undergo evaluation appointments with out dietician, licensed care , and   After the evaluation, she may be referred to a cardiologist and for a sleep evaluation and consult  She needs an EGD to evaluate the anatomy of her GI tract prior to the operation  I have spent over 45 minutes with her face to face in the office today discussing her options and details of the surgery  We have seen an animation of the surgery on the computer that illustrates how the operation is done and how the anatomy will be altered with the procedure  Over 50% of this was coordinating care  She was given the opportunity to ask questions and I have answered all of them  I have discussed and educated the patient with regards to the components of our multidisciplinary program and the importance of compliance and follow up in the post operative period  The patient was also instructed with regards to the importance of behavior modification, nutritional counseling, support meeting attendance and lifestyle changes that are important to ensure success  Although there is a great statistical chance of improvement or even resolution of most of her associated comorbidities, the results vary from patient to patient and they largely depend on her commitment and compliance         Simi Recinos MD, FACS, [facility]  2/8/2023  11:17 AM

## 2023-02-08 NOTE — PROGRESS NOTES
BARIATRIC INITIAL CONSULT - BARIATRIC SURGERY    Maia Calhoun 28 y o  female MRN: [id number]  Unit/Bed#:  Encounter: [id number]      HPI:  Maia Calhoun is a very pleasant 28 y o  female who presents with a longstanding history of morbid obesity and inability to sustain a meaningful weight loss  Here today to discuss bariatric options  She is a  worker   Body mass index is 46 8 kg/m²  ++Suffers from irregular menstrual cycles, HTN, RUBINA suspected      Visit type: consultation     Symptoms: excess weight, weight increase, inability to loss weight and fatigue    Associated Symptoms: none    Associated Conditions: sleep apnea and abdominal obesity  Disease Complications: hypertension and sleep apnea  Weight Loss Interest: high  Previous Diet Trials: low carb    Exercise Frequency:infrequency  Types of Exercise: walking      Review of Systems   Constitutional: Negative  Respiratory: Negative  Cardiovascular: Negative  Gastrointestinal: Negative  Musculoskeletal: Negative  Neurological: Negative  All other systems reviewed and are negative  Historical Information   Past Medical History:   Diagnosis Date   • Hypertension    • Morbid obesity with BMI of 40 0-44 9, adult ([facility] [address] )      History reviewed  No pertinent surgical history    Social History   Social History     Substance and Sexual Activity   Alcohol Use Yes    Comment: socially     Social History     Substance and Sexual Activity   Drug Use Never     Social History     Tobacco Use   Smoking Status Never   Smokeless Tobacco Never     Family History:   Family History   Problem Relation Age of Onset   • Hypertension Mother        Meds/Allergies   all medications and allergies reviewed  No Known Allergies    Objective       Current Vitals:   /94 (BP Location: Left arm, Patient Position: Sitting, Cuff Size: Large)   Pulse 94   Resp 16   Ht 5' 5 25" (1 657 m)   Wt 129 kg (283 lb 6 4 oz)   BMI 46 80 kg/m²       Physical Blood pressure (!) 97/55, pulse 72, temperature 97.8 °F (36.6 °C), temperature source Oral, resp. rate 11, height 5' 7\" (1.702 m), weight 232 lb (105.2 kg), SpO2 97 %, not currently breastfeeding. Patient resting in bed with eyes closed. No s.s of pain or discomfort at this time. Will continue to monitor.     Electronically signed by Shadia Nuñez RN on 9/6/2021 at 6:13 AM Exam  Vitals reviewed  Constitutional:       Appearance: She is well-developed  HENT:      Head: Normocephalic  Eyes:      Extraocular Movements: Extraocular movements intact  Cardiovascular:      Rate and Rhythm: Normal rate  Pulmonary:      Effort: Pulmonary effort is normal    Abdominal:      General: There is no distension  Musculoskeletal:         General: Normal range of motion  Cervical back: Normal range of motion  Neurological:      Mental Status: She is alert and oriented to person, place, and time  Psychiatric:         Mood and Affect: Mood normal          Behavior: Behavior normal          Thought Content: Thought content normal          Judgment: Judgment normal          Lab Results: I have personally reviewed pertinent lab results  Imaging: I have personally reviewed pertinent reports  EKG, Pathology, and Other Studies: I have personally reviewed pertinent reports  Assessment/PLAN:    28 y o  yo female with a long standing h/o of obesity and inability to sustain any meaningful weight loss on her own despite several attempts  She is interested in the Laparoscopic renetta-en-y gastric bypass vs sleeve gastrectomy  I have discussed with the patient that a percentage of patient's may experience new or worsened GERD and 18% risk of Carlin's esophagitis requiring surveillance EGDs after a sleeve gastrectomy  The patient understands this fully and accepts the risks should she undergo a sleeve gastrectomy  ++Suffers from irregular menstrual cycles, HTN, RUBINA suspected    I have explained our Enhanced Recovery After Bariatric Surgery (ERABS) protocol and benefits including preoperative, intraoperative and postoperative elements  As a part of his pre op process, she will undergo evaluation appointments with out dietician, licensed care , and    After the evaluation, she may be referred to a cardiologist and for a sleep evaluation and consult  She needs an EGD to evaluate the anatomy of her GI tract prior to the operation  I have spent over 45 minutes with her face to face in the office today discussing her options and details of the surgery  We have seen an animation of the surgery on the computer that illustrates how the operation is done and how the anatomy will be altered with the procedure  Over 50% of this was coordinating care  She was given the opportunity to ask questions and I have answered all of them  I have discussed and educated the patient with regards to the components of our multidisciplinary program and the importance of compliance and follow up in the post operative period  The patient was also instructed with regards to the importance of behavior modification, nutritional counseling, support meeting attendance and lifestyle changes that are important to ensure success  Although there is a great statistical chance of improvement or even resolution of most of her associated comorbidities, the results vary from patient to patient and they largely depend on her commitment and compliance         Rachana Pan MD, FACS, [facility]  2/8/2023  11:17 AM

## 2024-09-17 ENCOUNTER — HOSPITAL ENCOUNTER (EMERGENCY)
Facility: HOSPITAL | Age: 29
Discharge: HOME | End: 2024-09-17
Attending: EMERGENCY MEDICINE
Payer: COMMERCIAL

## 2024-09-17 ENCOUNTER — APPOINTMENT (EMERGENCY)
Dept: RADIOLOGY | Facility: HOSPITAL | Age: 29
End: 2024-09-17
Payer: COMMERCIAL

## 2024-09-17 VITALS
OXYGEN SATURATION: 96 % | SYSTOLIC BLOOD PRESSURE: 169 MMHG | HEART RATE: 76 BPM | BODY MASS INDEX: 46.24 KG/M2 | WEIGHT: 280 LBS | RESPIRATION RATE: 20 BRPM | TEMPERATURE: 98.2 F | DIASTOLIC BLOOD PRESSURE: 98 MMHG

## 2024-09-17 DIAGNOSIS — S16.1XXA CERVICAL STRAIN: Primary | ICD-10-CM

## 2024-09-17 DIAGNOSIS — I10 UNCONTROLLED HYPERTENSION: ICD-10-CM

## 2024-09-17 DIAGNOSIS — M54.12 CERVICAL RADICULOPATHY AT C5: ICD-10-CM

## 2024-09-17 LAB
ANION GAP SERPL CALCULATED.3IONS-SCNC: 6 MMOL/L (ref 4–13)
APTT PPP: 29 SECONDS (ref 23–34)
ATRIAL RATE: 70 BPM
BASOPHILS # BLD AUTO: 0.05 THOUSANDS/ΜL (ref 0–0.1)
BASOPHILS NFR BLD AUTO: 1 % (ref 0–1)
BUN SERPL-MCNC: 9 MG/DL (ref 5–25)
CALCIUM SERPL-MCNC: 8.6 MG/DL (ref 8.4–10.2)
CARDIAC TROPONIN I PNL SERPL HS: 3 NG/L
CHLORIDE SERPL-SCNC: 108 MMOL/L (ref 96–108)
CO2 SERPL-SCNC: 25 MMOL/L (ref 21–32)
CREAT SERPL-MCNC: 0.62 MG/DL (ref 0.6–1.3)
EOSINOPHIL # BLD AUTO: 0.33 THOUSAND/ΜL (ref 0–0.61)
EOSINOPHIL NFR BLD AUTO: 4 % (ref 0–6)
ERYTHROCYTE [DISTWIDTH] IN BLOOD BY AUTOMATED COUNT: 13.5 % (ref 11.6–15.1)
GFR SERPL CREATININE-BSD FRML MDRD: 121 ML/MIN/1.73SQ M
GLUCOSE SERPL-MCNC: 107 MG/DL (ref 65–140)
HCT VFR BLD AUTO: 40.1 % (ref 34.8–46.1)
HGB BLD-MCNC: 13.6 G/DL (ref 11.5–15.4)
IMM GRANULOCYTES # BLD AUTO: 0.03 THOUSAND/UL (ref 0–0.2)
IMM GRANULOCYTES NFR BLD AUTO: 0 % (ref 0–2)
INR PPP: 0.99 (ref 0.85–1.19)
LYMPHOCYTES # BLD AUTO: 2.24 THOUSANDS/ΜL (ref 0.6–4.47)
LYMPHOCYTES NFR BLD AUTO: 30 % (ref 14–44)
MCH RBC QN AUTO: 29.2 PG (ref 26.8–34.3)
MCHC RBC AUTO-ENTMCNC: 33.9 G/DL (ref 31.4–37.4)
MCV RBC AUTO: 86 FL (ref 82–98)
MONOCYTES # BLD AUTO: 0.44 THOUSAND/ΜL (ref 0.17–1.22)
MONOCYTES NFR BLD AUTO: 6 % (ref 4–12)
NEUTROPHILS # BLD AUTO: 4.35 THOUSANDS/ΜL (ref 1.85–7.62)
NEUTS SEG NFR BLD AUTO: 59 % (ref 43–75)
NRBC BLD AUTO-RTO: 0 /100 WBCS
P AXIS: 31 DEGREES
PLATELET # BLD AUTO: 164 THOUSANDS/UL (ref 149–390)
PMV BLD AUTO: 10.2 FL (ref 8.9–12.7)
POTASSIUM SERPL-SCNC: 3.9 MMOL/L (ref 3.5–5.3)
PR INTERVAL: 144 MS
PROTHROMBIN TIME: 13.8 SECONDS (ref 12.3–15)
QRS AXIS: -2 DEGREES
QRSD INTERVAL: 80 MS
QT INTERVAL: 388 MS
QTC INTERVAL: 419 MS
RBC # BLD AUTO: 4.65 MILLION/UL (ref 3.81–5.12)
SODIUM SERPL-SCNC: 139 MMOL/L (ref 135–147)
T WAVE AXIS: 21 DEGREES
VENTRICULAR RATE: 70 BPM
WBC # BLD AUTO: 7.44 THOUSAND/UL (ref 4.31–10.16)

## 2024-09-17 PROCEDURE — 93005 ELECTROCARDIOGRAM TRACING: CPT

## 2024-09-17 PROCEDURE — 96374 THER/PROPH/DIAG INJ IV PUSH: CPT

## 2024-09-17 PROCEDURE — 85730 THROMBOPLASTIN TIME PARTIAL: CPT | Performed by: PHYSICIAN ASSISTANT

## 2024-09-17 PROCEDURE — 85025 COMPLETE CBC W/AUTO DIFF WBC: CPT | Performed by: PHYSICIAN ASSISTANT

## 2024-09-17 PROCEDURE — 36415 COLL VENOUS BLD VENIPUNCTURE: CPT | Performed by: PHYSICIAN ASSISTANT

## 2024-09-17 PROCEDURE — 96375 TX/PRO/DX INJ NEW DRUG ADDON: CPT

## 2024-09-17 PROCEDURE — 71045 X-RAY EXAM CHEST 1 VIEW: CPT

## 2024-09-17 PROCEDURE — 99284 EMERGENCY DEPT VISIT MOD MDM: CPT | Performed by: PHYSICIAN ASSISTANT

## 2024-09-17 PROCEDURE — 80048 BASIC METABOLIC PNL TOTAL CA: CPT | Performed by: PHYSICIAN ASSISTANT

## 2024-09-17 PROCEDURE — 99284 EMERGENCY DEPT VISIT MOD MDM: CPT

## 2024-09-17 PROCEDURE — 85610 PROTHROMBIN TIME: CPT | Performed by: PHYSICIAN ASSISTANT

## 2024-09-17 PROCEDURE — 93010 ELECTROCARDIOGRAM REPORT: CPT | Performed by: INTERNAL MEDICINE

## 2024-09-17 PROCEDURE — 84484 ASSAY OF TROPONIN QUANT: CPT | Performed by: PHYSICIAN ASSISTANT

## 2024-09-17 RX ORDER — LABETALOL HYDROCHLORIDE 5 MG/ML
10 INJECTION, SOLUTION INTRAVENOUS ONCE
Status: COMPLETED | OUTPATIENT
Start: 2024-09-17 | End: 2024-09-17

## 2024-09-17 RX ORDER — MORPHINE SULFATE 4 MG/ML
4 INJECTION, SOLUTION INTRAMUSCULAR; INTRAVENOUS ONCE
Status: COMPLETED | OUTPATIENT
Start: 2024-09-17 | End: 2024-09-17

## 2024-09-17 RX ORDER — ONDANSETRON 4 MG/1
4 TABLET, ORALLY DISINTEGRATING ORAL ONCE
Status: DISCONTINUED | OUTPATIENT
Start: 2024-09-17 | End: 2024-09-17 | Stop reason: HOSPADM

## 2024-09-17 RX ORDER — METHOCARBAMOL 500 MG/1
500 TABLET, FILM COATED ORAL 4 TIMES DAILY
Qty: 12 TABLET | Refills: 0 | Status: SHIPPED | OUTPATIENT
Start: 2024-09-17 | End: 2024-09-20

## 2024-09-17 RX ADMIN — LABETALOL HYDROCHLORIDE 10 MG: 5 INJECTION, SOLUTION INTRAVENOUS at 14:08

## 2024-09-17 RX ADMIN — MORPHINE SULFATE 4 MG: 4 INJECTION INTRAVENOUS at 13:14

## 2024-09-17 NOTE — DISCHARGE INSTRUCTIONS
Do not sleep on your stomach or your side as this will impinge the nerve roots to come off of your neck and cause more pain.    Buy a cervical pillow for support.  You can place a rolled hand towel under the gape of your neck for support in the meantime.    Call today and arrange a follow-up appointment with your family physician for repeat blood pressure in 2 days.  I don't want you to wait 2 weeks until you have your appointment.  Just let them know that you were seen in the emergency room and wanted your blood pressure rechecked.

## 2024-09-17 NOTE — ED PROVIDER NOTES
1. Cervical strain    2. Cervical radiculopathy at C5    3. Uncontrolled hypertension      ED Disposition       ED Disposition   Discharge    Condition   Stable    Date/Time   Tue Sep 17, 2024  2:04 PM    Comment   Roxann Johnson discharge to home/self care.                   Assessment & Plan       Medical Decision Making  This 29-year-old female presents emergency room with a 1 week history of awakening with left-sided neck pain.  She states that the left-sided neck pain radiates to the posterior aspect of her left arm.  She has some mild tenderness with range of motion of her neck but it is present with rest as well.  She has good range of motion of her left shoulder with no pain elicited.  She denies any shortness of breath, nausea, diaphoresis.  She said that the pain is worse with activity.  She presents with a blood pressure of 197/115.  She denies any lightheadedness, headaches, chest pain, shortness of breath.  She states that she has been on the losartan 50 mg and she has an appointment coming up in 2 weeks to have her medications adjusted.  She said that her primary care provider is going to increase it to 100 mg daily.    Past medical history is positive for hypertension    Physical exam this 29-year-old obese female is alert and oriented x 3.  She is in no acute distress.  Her lungs were clear to auscultation.  Her heart is regular rate and rhythm without murmur.  Her neck is supple upon palpation there is some tenderness palpated over the left paravertebral muscles.  Patient has pain with terminal range of motion in all planes.  She has good range of motion of her left shoulder in all planes.  She has a negative impingement sign.  Reflexes are +2 and symmetrical in the upper extremities.  Motor and sensory are intact to the median, ulnar, radial distribution of the upper extremities.    Laboratory studies were taken and were reviewed.  There is no evidence of endorgan damage.  Her blood pressure came  down with the pain medication as well as labetalol.  It was she was discharged home with a pressure 169/98.    Chest x-ray does not demonstrate any acute cardiopulmonary disease     EKG does not demonstrate any acute signs of ischemia    Impression  Acute cervical strain  Left C5-6 cervical radiculopathy    Plan  Patient was discharged home with Voltaren 50 mg twice daily for 7 days.  She was also given a prescription for Robaxin to take every 6 hours as needed for muscle spasm.  She can apply a cervical pillow.  She can follow-up with her family physician for symptoms persist for recheck exam.  She is going to call and arrange a follow-up blood pressure check with her physician in 2 days as I do not want her to wait 2 weeks until she has a repeat pressure.  She is going to continue with her losartan 50 mg until her follow-up with her PCP.    Amount and/or Complexity of Data Reviewed  Labs: ordered.     Details: Independently interpreted by me  Radiology: ordered.     Details: Portable chest x-ray independently interpreted by me  ECG/medicine tests: ordered and independent interpretation performed.     Details: Normal sinus rhythm, no ectopy, no signs of ischemia , independently interpreted by me    Risk  Prescription drug management.                    ED Course as of 09/17/24 1604   Tue Sep 17, 2024   1359 /77   1441 /89       Medications   ondansetron (ZOFRAN-ODT) dispersible tablet 4 mg (0 mg Oral Hold 9/17/24 1315)   morphine injection 4 mg (4 mg Intravenous Given 9/17/24 1314)   labetalol (NORMODYNE) injection 10 mg (10 mg Intravenous Given 9/17/24 1408)       History of Present Illness         History provided by:  Patient  Neck Pain  Pain location:  L side  Quality:  Aching  Pain radiates to:  L arm  Pain severity:  Moderate  Pain is:  Same all the time  Onset quality:  Gradual  Duration:  1 week  Timing:  Constant  Progression:  Waxing and waning  Chronicity:  New  Context: not fall, not jumping  from heights, not lifting a heavy object, not MCA, not MVC, not pedestrian accident and not recent injury    Relieved by:  Nothing  Worsened by:  Bending, twisting and position  Ineffective treatments: Advil, IcyHot, acetaminophen.  Associated symptoms: no bladder incontinence, no bowel incontinence, no chest pain, no fever, no headaches, no leg pain, no numbness, no paresis, no photophobia, no syncope, no tingling, no visual change, no weakness and no weight loss    Risk factors: no hx of head and neck radiation, no hx of osteoporosis, no hx of spinal trauma, no recent epidural, no recent head injury and no recurrent falls            Review of Systems   Constitutional:  Positive for activity change. Negative for appetite change, chills, diaphoresis, fatigue, fever and weight loss.   HENT:  Negative for congestion, ear discharge, ear pain, postnasal drip, rhinorrhea and sore throat.    Eyes:  Negative for photophobia.   Cardiovascular:  Negative for chest pain and syncope.   Gastrointestinal:  Negative for abdominal pain, bowel incontinence, diarrhea, nausea and vomiting.   Genitourinary:  Negative for bladder incontinence, dysuria, frequency, hematuria and urgency.   Musculoskeletal:  Positive for neck pain and neck stiffness. Negative for gait problem.   Skin:  Negative for color change and pallor.   Neurological:  Negative for tingling, weakness, numbness and headaches.   Psychiatric/Behavioral:  Negative for confusion.    All other systems reviewed and are negative.          Objective     ED Triage Vitals   Temperature Pulse Blood Pressure Respirations SpO2 Patient Position - Orthostatic VS   09/17/24 1212 09/17/24 1212 09/17/24 1212 09/17/24 1212 09/17/24 1212 09/17/24 1212   98.2 °F (36.8 °C) 73 (!) 197/115 20 97 % Sitting      Temp Source Heart Rate Source BP Location FiO2 (%) Pain Score    09/17/24 1212 09/17/24 1212 09/17/24 1212 -- 09/17/24 1314    Oral Monitor Right arm  8        Physical Exam  Vitals and  nursing note reviewed.   Constitutional:       General: She is not in acute distress.     Appearance: Normal appearance. She is obese. She is not ill-appearing, toxic-appearing or diaphoretic.   HENT:      Head: Normocephalic.      Right Ear: External ear normal.      Left Ear: External ear normal.      Nose: No congestion or rhinorrhea.      Mouth/Throat:      Pharynx: No oropharyngeal exudate or posterior oropharyngeal erythema.   Cardiovascular:      Rate and Rhythm: Normal rate and regular rhythm.      Heart sounds: Normal heart sounds.   Pulmonary:      Effort: Pulmonary effort is normal.      Breath sounds: Normal breath sounds.   Musculoskeletal:      Cervical back: Neck supple. Tenderness present.   Lymphadenopathy:      Cervical: No cervical adenopathy.   Skin:     General: Skin is warm.      Capillary Refill: Capillary refill takes less than 2 seconds.   Neurological:      Mental Status: She is alert and oriented to person, place, and time.      Gait: Gait normal.   Psychiatric:         Mood and Affect: Mood normal.         Behavior: Behavior normal.         Thought Content: Thought content normal.         Judgment: Judgment normal.         Labs Reviewed   PROTIME-INR - Normal       Result Value    Protime 13.8      INR 0.99      Narrative:     INR Therapeutic Range    Indication                                             INR Range      Atrial Fibrillation                                               2.0-3.0  Hypercoagulable State                                    2.0.2.3  Left Ventricular Asist Device                            2.0-3.0  Mechanical Heart Valve                                  -    Aortic(with afib, MI, embolism, HF, LA enlargement,    and/or coagulopathy)                                     2.0-3.0 (2.5-3.5)     Mitral                                                             2.5-3.5  Prosthetic/Bioprosthetic Heart Valve               2.0-3.0  Venous thromboembolism (VTE: VT, PE         2.0-3.0   APTT - Normal    PTT 29     HS TROPONIN I 0HR - Normal    hs TnI 0hr 3     CBC AND DIFFERENTIAL    WBC 7.44      RBC 4.65      Hemoglobin 13.6      Hematocrit 40.1      MCV 86      MCH 29.2      MCHC 33.9      RDW 13.5      MPV 10.2      Platelets 164      nRBC 0      Segmented % 59      Immature Grans % 0      Lymphocytes % 30      Monocytes % 6      Eosinophils Relative 4      Basophils Relative 1      Absolute Neutrophils 4.35      Absolute Immature Grans 0.03      Absolute Lymphocytes 2.24      Absolute Monocytes 0.44      Eosinophils Absolute 0.33      Basophils Absolute 0.05     BASIC METABOLIC PANEL    Sodium 139      Potassium 3.9      Chloride 108      CO2 25      ANION GAP 6      BUN 9      Creatinine 0.62      Glucose 107      Calcium 8.6      eGFR 121      Narrative:     National Kid[employer] Disease Foundation guidelines for Chronic Kidney Disease (CKD):     Stage 1 with normal or high GFR (GFR > 90 mL/min/1.73 square meters)    Stage 2 Mild CKD (GFR = 60-89 mL/min/1.73 square meters)    Stage 3A Moderate CKD (GFR = 45-59 mL/min/1.73 square meters)    Stage 3B Moderate CKD (GFR = 30-44 mL/min/1.73 square meters)    Stage 4 Severe CKD (GFR = 15-29 mL/min/1.73 square meters)    Stage 5 End Stage CKD (GFR <15 mL/min/1.73 square meters)  Note: GFR calculation is accurate only with a steady state creatinine   UA W REFLEX TO MICROSCOPIC WITH REFLEX TO CULTURE   HS TROPONIN I 2HR   HS TROPONIN I 4HR     XR chest 1 view portable   Final Interpretation by Gil Alvarado MD (09/17 1322)      No acute cardiopulmonary disease.            Workstation performed: [id number]             ECG 12 Lead Documentation Only    Date/Time: 9/17/2024 1:41 PM    Performed by: Julie Lynn Gutzweiler, PA-C  Authorized by: Julie Lynn Gutzweiler, PA-C    Indications / Diagnosis:  Neck and left arm pain  ECG reviewed by me, the ED Provider: yes    Patient location:  ED  Previous ECG:     Previous ECG:  Unavailable     Comparison to cardiac monitor: Yes    Interpretation:     Interpretation: normal    Rate:     ECG rate:  70    ECG rate assessment: normal    Rhythm:     Rhythm: sinus rhythm    Ectopy:     Ectopy: none    QRS:     QRS axis:  Normal    QRS intervals:  Normal  Conduction:     Conduction: normal    ST segments:     ST segments:  Normal  T waves:     T waves: normal    Comments:      No acute signs of ischemia.    Independently interpreted by me Julie Lynn Gutzweiler, PA-C  09/17/24 3236

## 2024-10-06 ENCOUNTER — APPOINTMENT (EMERGENCY)
Dept: CT IMAGING | Facility: HOSPITAL | Age: 29
DRG: 580 | End: 2024-10-06
Payer: COMMERCIAL

## 2024-10-06 ENCOUNTER — HOSPITAL ENCOUNTER (INPATIENT)
Facility: HOSPITAL | Age: 29
LOS: 2 days | Discharge: HOME | DRG: 580 | End: 2024-10-09
Attending: EMERGENCY MEDICINE | Admitting: SURGERY
Payer: COMMERCIAL

## 2024-10-06 DIAGNOSIS — N61.1 BREAST ABSCESS: Primary | ICD-10-CM

## 2024-10-06 LAB
ALBUMIN SERPL BCG-MCNC: 4 G/DL (ref 3.5–5)
ALP SERPL-CCNC: 58 U/L (ref 34–104)
ALT SERPL W P-5'-P-CCNC: 63 U/L (ref 7–52)
ANION GAP SERPL CALCULATED.3IONS-SCNC: 6 MMOL/L (ref 4–13)
AST SERPL W P-5'-P-CCNC: 32 U/L (ref 13–39)
BASOPHILS # BLD AUTO: 0.04 THOUSANDS/ΜL (ref 0–0.1)
BASOPHILS NFR BLD AUTO: 1 % (ref 0–1)
BILIRUB SERPL-MCNC: 0.37 MG/DL (ref 0.2–1)
BUN SERPL-MCNC: 8 MG/DL (ref 5–25)
CALCIUM SERPL-MCNC: 8.9 MG/DL (ref 8.4–10.2)
CHLORIDE SERPL-SCNC: 106 MMOL/L (ref 96–108)
CO2 SERPL-SCNC: 26 MMOL/L (ref 21–32)
CREAT SERPL-MCNC: 0.65 MG/DL (ref 0.6–1.3)
CRP SERPL QL: 21.5 MG/L
EOSINOPHIL # BLD AUTO: 0.3 THOUSAND/ΜL (ref 0–0.61)
EOSINOPHIL NFR BLD AUTO: 4 % (ref 0–6)
ERYTHROCYTE [DISTWIDTH] IN BLOOD BY AUTOMATED COUNT: 13.2 % (ref 11.6–15.1)
ERYTHROCYTE [SEDIMENTATION RATE] IN BLOOD: 6 MM/HOUR (ref 0–19)
GFR SERPL CREATININE-BSD FRML MDRD: 119 ML/MIN/1.73SQ M
GLUCOSE SERPL-MCNC: 108 MG/DL (ref 65–140)
HCT VFR BLD AUTO: 39.4 % (ref 34.8–46.1)
HGB BLD-MCNC: 13 G/DL (ref 11.5–15.4)
IMM GRANULOCYTES # BLD AUTO: 0.05 THOUSAND/UL (ref 0–0.2)
IMM GRANULOCYTES NFR BLD AUTO: 1 % (ref 0–2)
LYMPHOCYTES # BLD AUTO: 1.87 THOUSANDS/ΜL (ref 0.6–4.47)
LYMPHOCYTES NFR BLD AUTO: 23 % (ref 14–44)
MCH RBC QN AUTO: 29.3 PG (ref 26.8–34.3)
MCHC RBC AUTO-ENTMCNC: 33 G/DL (ref 31.4–37.4)
MCV RBC AUTO: 89 FL (ref 82–98)
MONOCYTES # BLD AUTO: 0.51 THOUSAND/ΜL (ref 0.17–1.22)
MONOCYTES NFR BLD AUTO: 6 % (ref 4–12)
NEUTROPHILS # BLD AUTO: 5.22 THOUSANDS/ΜL (ref 1.85–7.62)
NEUTS SEG NFR BLD AUTO: 65 % (ref 43–75)
NRBC BLD AUTO-RTO: 0 /100 WBCS
PLATELET # BLD AUTO: 163 THOUSANDS/UL (ref 149–390)
PMV BLD AUTO: 10.5 FL (ref 8.9–12.7)
POTASSIUM SERPL-SCNC: 4 MMOL/L (ref 3.5–5.3)
PROT SERPL-MCNC: 6.5 G/DL (ref 6.4–8.4)
RBC # BLD AUTO: 4.43 MILLION/UL (ref 3.81–5.12)
SODIUM SERPL-SCNC: 138 MMOL/L (ref 135–147)
WBC # BLD AUTO: 7.99 THOUSAND/UL (ref 4.31–10.16)

## 2024-10-06 PROCEDURE — 71260 CT THORAX DX C+: CPT

## 2024-10-06 PROCEDURE — 86140 C-REACTIVE PROTEIN: CPT | Performed by: EMERGENCY MEDICINE

## 2024-10-06 PROCEDURE — 80053 COMPREHEN METABOLIC PANEL: CPT | Performed by: EMERGENCY MEDICINE

## 2024-10-06 PROCEDURE — 99285 EMERGENCY DEPT VISIT HI MDM: CPT | Performed by: EMERGENCY MEDICINE

## 2024-10-06 PROCEDURE — 36415 COLL VENOUS BLD VENIPUNCTURE: CPT | Performed by: EMERGENCY MEDICINE

## 2024-10-06 PROCEDURE — 85025 COMPLETE CBC W/AUTO DIFF WBC: CPT | Performed by: EMERGENCY MEDICINE

## 2024-10-06 PROCEDURE — 99284 EMERGENCY DEPT VISIT MOD MDM: CPT

## 2024-10-06 PROCEDURE — 85652 RBC SED RATE AUTOMATED: CPT | Performed by: EMERGENCY MEDICINE

## 2024-10-06 RX ORDER — HEPARIN SODIUM 5000 [USP'U]/ML
5000 INJECTION, SOLUTION INTRAVENOUS; SUBCUTANEOUS EVERY 8 HOURS SCHEDULED
Status: DISCONTINUED | OUTPATIENT
Start: 2024-10-06 | End: 2024-10-06

## 2024-10-06 RX ORDER — ONDANSETRON 2 MG/ML
4 INJECTION INTRAMUSCULAR; INTRAVENOUS EVERY 6 HOURS PRN
Status: DISCONTINUED | OUTPATIENT
Start: 2024-10-06 | End: 2024-10-09 | Stop reason: HOSPADM

## 2024-10-06 RX ORDER — HEPARIN SODIUM 5000 [USP'U]/ML
7500 INJECTION, SOLUTION INTRAVENOUS; SUBCUTANEOUS EVERY 8 HOURS SCHEDULED
Status: DISCONTINUED | OUTPATIENT
Start: 2024-10-06 | End: 2024-10-09 | Stop reason: HOSPADM

## 2024-10-06 RX ORDER — ACETAMINOPHEN 10 MG/ML
1000 INJECTION, SOLUTION INTRAVENOUS EVERY 6 HOURS PRN
Status: DISCONTINUED | OUTPATIENT
Start: 2024-10-06 | End: 2024-10-07

## 2024-10-06 RX ORDER — LOSARTAN POTASSIUM 50 MG/1
50 TABLET ORAL DAILY
COMMUNITY

## 2024-10-06 RX ORDER — HEPARIN SODIUM 5000 [USP'U]/ML
7500 INJECTION, SOLUTION INTRAVENOUS; SUBCUTANEOUS EVERY 8 HOURS SCHEDULED
Status: CANCELLED | OUTPATIENT
Start: 2024-10-06

## 2024-10-06 RX ORDER — CEFTRIAXONE 1 G/50ML
1000 INJECTION, SOLUTION INTRAVENOUS ONCE
Status: COMPLETED | OUTPATIENT
Start: 2024-10-06 | End: 2024-10-06

## 2024-10-06 RX ORDER — OXYCODONE AND ACETAMINOPHEN 5; 325 MG/1; MG/1
1 TABLET ORAL ONCE
Status: COMPLETED | OUTPATIENT
Start: 2024-10-06 | End: 2024-10-06

## 2024-10-06 RX ORDER — SODIUM CHLORIDE 9 MG/ML
100 INJECTION, SOLUTION INTRAVENOUS CONTINUOUS
Status: DISCONTINUED | OUTPATIENT
Start: 2024-10-06 | End: 2024-10-08

## 2024-10-06 RX ADMIN — ACETAMINOPHEN 1000 MG: 10 INJECTION INTRAVENOUS at 22:03

## 2024-10-06 RX ADMIN — CEFTRIAXONE 1000 MG: 1 INJECTION, SOLUTION INTRAVENOUS at 19:24

## 2024-10-06 RX ADMIN — IOHEXOL 85 ML: 350 INJECTION, SOLUTION INTRAVENOUS at 17:30

## 2024-10-06 RX ADMIN — VANCOMYCIN HYDROCHLORIDE 1250 MG: 1 INJECTION, POWDER, LYOPHILIZED, FOR SOLUTION INTRAVENOUS at 20:12

## 2024-10-06 RX ADMIN — SODIUM CHLORIDE 100 ML/HR: 0.9 INJECTION, SOLUTION INTRAVENOUS at 20:12

## 2024-10-06 RX ADMIN — OXYCODONE HYDROCHLORIDE AND ACETAMINOPHEN 1 TABLET: 5; 325 TABLET ORAL at 16:40

## 2024-10-07 ENCOUNTER — ANESTHESIA (INPATIENT)
Dept: PERIOP | Facility: HOSPITAL | Age: 29
DRG: 580 | End: 2024-10-07
Payer: COMMERCIAL

## 2024-10-07 ENCOUNTER — ANESTHESIA EVENT (INPATIENT)
Dept: PERIOP | Facility: HOSPITAL | Age: 29
DRG: 580 | End: 2024-10-07
Payer: COMMERCIAL

## 2024-10-07 PROBLEM — G47.30 SLEEP APNEA: Status: ACTIVE | Noted: 2024-10-07

## 2024-10-07 PROBLEM — N61.1 LEFT BREAST ABSCESS: Status: ACTIVE | Noted: 2024-10-07

## 2024-10-07 PROBLEM — J06.9 URI (UPPER RESPIRATORY INFECTION): Status: ACTIVE | Noted: 2024-10-07

## 2024-10-07 LAB
BASOPHILS # BLD AUTO: 0.03 THOUSANDS/ΜL (ref 0–0.1)
BASOPHILS NFR BLD AUTO: 0 % (ref 0–1)
EOSINOPHIL # BLD AUTO: 0.29 THOUSAND/ΜL (ref 0–0.61)
EOSINOPHIL NFR BLD AUTO: 4 % (ref 0–6)
ERYTHROCYTE [DISTWIDTH] IN BLOOD BY AUTOMATED COUNT: 13.4 % (ref 11.6–15.1)
EXT PREGNANCY TEST URINE: NEGATIVE
EXT. CONTROL: NORMAL
HCT VFR BLD AUTO: 38.1 % (ref 34.8–46.1)
HGB BLD-MCNC: 12.7 G/DL (ref 11.5–15.4)
IMM GRANULOCYTES # BLD AUTO: 0.03 THOUSAND/UL (ref 0–0.2)
IMM GRANULOCYTES NFR BLD AUTO: 0 % (ref 0–2)
LYMPHOCYTES # BLD AUTO: 2.03 THOUSANDS/ΜL (ref 0.6–4.47)
LYMPHOCYTES NFR BLD AUTO: 25 % (ref 14–44)
MCH RBC QN AUTO: 29.9 PG (ref 26.8–34.3)
MCHC RBC AUTO-ENTMCNC: 33.3 G/DL (ref 31.4–37.4)
MCV RBC AUTO: 90 FL (ref 82–98)
MONOCYTES # BLD AUTO: 0.45 THOUSAND/ΜL (ref 0.17–1.22)
MONOCYTES NFR BLD AUTO: 5 % (ref 4–12)
NEUTROPHILS # BLD AUTO: 5.46 THOUSANDS/ΜL (ref 1.85–7.62)
NEUTS SEG NFR BLD AUTO: 66 % (ref 43–75)
NRBC BLD AUTO-RTO: 0 /100 WBCS
PLATELET # BLD AUTO: 168 THOUSANDS/UL (ref 149–390)
PMV BLD AUTO: 10.6 FL (ref 8.9–12.7)
RBC # BLD AUTO: 4.25 MILLION/UL (ref 3.81–5.12)
WBC # BLD AUTO: 8.29 THOUSAND/UL (ref 4.31–10.16)

## 2024-10-07 PROCEDURE — 87070 CULTURE OTHR SPECIMN AEROBIC: CPT | Performed by: SURGERY

## 2024-10-07 PROCEDURE — 99222 1ST HOSP IP/OBS MODERATE 55: CPT | Performed by: SURGERY

## 2024-10-07 PROCEDURE — 19020 MASTOTOMY EXPL DRG ABSC DP: CPT | Performed by: SURGERY

## 2024-10-07 PROCEDURE — 19020 MASTOTOMY EXPL DRG ABSC DP: CPT

## 2024-10-07 PROCEDURE — 87077 CULTURE AEROBIC IDENTIFY: CPT | Performed by: SURGERY

## 2024-10-07 PROCEDURE — 85025 COMPLETE CBC W/AUTO DIFF WBC: CPT | Performed by: SURGERY

## 2024-10-07 PROCEDURE — 81025 URINE PREGNANCY TEST: CPT | Performed by: SURGERY

## 2024-10-07 PROCEDURE — 0H9U0ZZ DRAINAGE OF LEFT BREAST, OPEN APPROACH: ICD-10-PCS | Performed by: SURGERY

## 2024-10-07 PROCEDURE — 87181 SC STD AGAR DILUTION PER AGT: CPT | Performed by: SURGERY

## 2024-10-07 PROCEDURE — 0HDU0ZZ EXTRACTION OF LEFT BREAST, OPEN APPROACH: ICD-10-PCS | Performed by: SURGERY

## 2024-10-07 PROCEDURE — 87205 SMEAR GRAM STAIN: CPT | Performed by: SURGERY

## 2024-10-07 PROCEDURE — 87075 CULTR BACTERIA EXCEPT BLOOD: CPT | Performed by: SURGERY

## 2024-10-07 PROCEDURE — 97605 NEG PRS WND THER DME<=50SQCM: CPT | Performed by: SURGERY

## 2024-10-07 RX ORDER — MIDAZOLAM HYDROCHLORIDE 2 MG/2ML
INJECTION, SOLUTION INTRAMUSCULAR; INTRAVENOUS AS NEEDED
Status: DISCONTINUED | OUTPATIENT
Start: 2024-10-07 | End: 2024-10-07

## 2024-10-07 RX ORDER — ACETAMINOPHEN 325 MG/1
650 TABLET ORAL EVERY 6 HOURS PRN
Status: DISCONTINUED | OUTPATIENT
Start: 2024-10-07 | End: 2024-10-09 | Stop reason: HOSPADM

## 2024-10-07 RX ORDER — PROPOFOL 10 MG/ML
INJECTION, EMULSION INTRAVENOUS AS NEEDED
Status: DISCONTINUED | OUTPATIENT
Start: 2024-10-07 | End: 2024-10-07

## 2024-10-07 RX ORDER — OXYCODONE HYDROCHLORIDE 10 MG/1
10 TABLET ORAL EVERY 4 HOURS PRN
Status: DISCONTINUED | OUTPATIENT
Start: 2024-10-07 | End: 2024-10-09 | Stop reason: HOSPADM

## 2024-10-07 RX ORDER — OXYCODONE HYDROCHLORIDE 5 MG/1
5 TABLET ORAL EVERY 4 HOURS PRN
Status: DISCONTINUED | OUTPATIENT
Start: 2024-10-07 | End: 2024-10-09 | Stop reason: HOSPADM

## 2024-10-07 RX ORDER — LIDOCAINE HYDROCHLORIDE 10 MG/ML
INJECTION, SOLUTION EPIDURAL; INFILTRATION; INTRACAUDAL; PERINEURAL AS NEEDED
Status: DISCONTINUED | OUTPATIENT
Start: 2024-10-07 | End: 2024-10-07

## 2024-10-07 RX ORDER — HYDRALAZINE HYDROCHLORIDE 20 MG/ML
10 INJECTION INTRAMUSCULAR; INTRAVENOUS EVERY 6 HOURS PRN
Status: DISCONTINUED | OUTPATIENT
Start: 2024-10-07 | End: 2024-10-09 | Stop reason: HOSPADM

## 2024-10-07 RX ORDER — MAGNESIUM HYDROXIDE 1200 MG/15ML
LIQUID ORAL AS NEEDED
Status: DISCONTINUED | OUTPATIENT
Start: 2024-10-07 | End: 2024-10-07 | Stop reason: HOSPADM

## 2024-10-07 RX ORDER — LOSARTAN POTASSIUM 50 MG/1
50 TABLET ORAL DAILY
Status: DISCONTINUED | OUTPATIENT
Start: 2024-10-07 | End: 2024-10-09 | Stop reason: HOSPADM

## 2024-10-07 RX ORDER — ONDANSETRON 2 MG/ML
INJECTION INTRAMUSCULAR; INTRAVENOUS AS NEEDED
Status: DISCONTINUED | OUTPATIENT
Start: 2024-10-07 | End: 2024-10-07

## 2024-10-07 RX ORDER — KETAMINE HCL IN NACL, ISO-OSM 100MG/10ML
SYRINGE (ML) INJECTION AS NEEDED
Status: DISCONTINUED | OUTPATIENT
Start: 2024-10-07 | End: 2024-10-07

## 2024-10-07 RX ORDER — CEFTRIAXONE 1 G/50ML
1000 INJECTION, SOLUTION INTRAVENOUS EVERY 24 HOURS
Status: DISCONTINUED | OUTPATIENT
Start: 2024-10-07 | End: 2024-10-09 | Stop reason: HOSPADM

## 2024-10-07 RX ORDER — FENTANYL CITRATE 50 UG/ML
INJECTION, SOLUTION INTRAMUSCULAR; INTRAVENOUS AS NEEDED
Status: DISCONTINUED | OUTPATIENT
Start: 2024-10-07 | End: 2024-10-07

## 2024-10-07 RX ORDER — BUPIVACAINE HYDROCHLORIDE 5 MG/ML
INJECTION, SOLUTION EPIDURAL; INTRACAUDAL AS NEEDED
Status: DISCONTINUED | OUTPATIENT
Start: 2024-10-07 | End: 2024-10-07 | Stop reason: HOSPADM

## 2024-10-07 RX ORDER — DEXAMETHASONE SODIUM PHOSPHATE 10 MG/ML
INJECTION, SOLUTION INTRAMUSCULAR; INTRAVENOUS AS NEEDED
Status: DISCONTINUED | OUTPATIENT
Start: 2024-10-07 | End: 2024-10-07

## 2024-10-07 RX ADMIN — SODIUM CHLORIDE 100 ML/HR: 0.9 INJECTION, SOLUTION INTRAVENOUS at 15:47

## 2024-10-07 RX ADMIN — LOSARTAN POTASSIUM 50 MG: 50 TABLET, FILM COATED ORAL at 09:39

## 2024-10-07 RX ADMIN — LIDOCAINE HYDROCHLORIDE 50 MG: 10 INJECTION, SOLUTION EPIDURAL; INFILTRATION; INTRACAUDAL; PERINEURAL at 14:08

## 2024-10-07 RX ADMIN — OXYCODONE HYDROCHLORIDE 10 MG: 10 TABLET ORAL at 22:35

## 2024-10-07 RX ADMIN — VANCOMYCIN HYDROCHLORIDE 1500 MG: 1 INJECTION, POWDER, LYOPHILIZED, FOR SOLUTION INTRAVENOUS at 19:43

## 2024-10-07 RX ADMIN — ONDANSETRON 4 MG: 2 INJECTION INTRAMUSCULAR; INTRAVENOUS at 14:41

## 2024-10-07 RX ADMIN — MORPHINE SULFATE 2 MG: 2 INJECTION, SOLUTION INTRAMUSCULAR; INTRAVENOUS at 11:32

## 2024-10-07 RX ADMIN — CEFTRIAXONE 1000 MG: 1 INJECTION, SOLUTION INTRAVENOUS at 16:40

## 2024-10-07 RX ADMIN — HYDRALAZINE HYDROCHLORIDE 10 MG: 20 INJECTION INTRAMUSCULAR; INTRAVENOUS at 18:50

## 2024-10-07 RX ADMIN — HEPARIN SODIUM 7500 UNITS: 5000 INJECTION, SOLUTION INTRAVENOUS; SUBCUTANEOUS at 22:15

## 2024-10-07 RX ADMIN — OXYCODONE HYDROCHLORIDE 10 MG: 10 TABLET ORAL at 17:54

## 2024-10-07 RX ADMIN — MIDAZOLAM 2 MG: 1 INJECTION INTRAMUSCULAR; INTRAVENOUS at 14:04

## 2024-10-07 RX ADMIN — DEXAMETHASONE SODIUM PHOSPHATE 10 MG: 10 INJECTION, SOLUTION INTRAMUSCULAR; INTRAVENOUS at 14:08

## 2024-10-07 RX ADMIN — FENTANYL CITRATE 100 MCG: 50 INJECTION, SOLUTION INTRAMUSCULAR; INTRAVENOUS at 14:08

## 2024-10-07 RX ADMIN — VANCOMYCIN HYDROCHLORIDE 1500 MG: 1 INJECTION, POWDER, LYOPHILIZED, FOR SOLUTION INTRAVENOUS at 08:19

## 2024-10-07 RX ADMIN — Medication 12 MCG: at 14:13

## 2024-10-07 RX ADMIN — SODIUM CHLORIDE 100 ML/HR: 0.9 INJECTION, SOLUTION INTRAVENOUS at 08:19

## 2024-10-07 RX ADMIN — MORPHINE SULFATE 2 MG: 2 INJECTION, SOLUTION INTRAMUSCULAR; INTRAVENOUS at 08:20

## 2024-10-07 RX ADMIN — HEPARIN SODIUM 7500 UNITS: 5000 INJECTION, SOLUTION INTRAVENOUS; SUBCUTANEOUS at 06:19

## 2024-10-07 RX ADMIN — Medication 12 MCG: at 14:08

## 2024-10-07 RX ADMIN — FENTANYL CITRATE 100 MCG: 50 INJECTION, SOLUTION INTRAMUSCULAR; INTRAVENOUS at 14:18

## 2024-10-07 RX ADMIN — HEPARIN SODIUM 7500 UNITS: 5000 INJECTION, SOLUTION INTRAVENOUS; SUBCUTANEOUS at 15:49

## 2024-10-07 RX ADMIN — PROPOFOL 200 MG: 10 INJECTION, EMULSION INTRAVENOUS at 14:08

## 2024-10-07 RX ADMIN — Medication 20 MG: at 14:30

## 2024-10-07 NOTE — PROGRESS NOTES
Roxann Johnson is a 29 y.o. female who is currently ordered Vancomycin IV with management by the Pharmacy Consult service.  Relevant clinical data and objective / subjective history reviewed.  Vancomycin Assessment:  Indication and Goal AUC/Trough: Soft tissue (goal -600, trough >10)  Clinical Status: stable  Micro:   pending  Renal Function:  SCr: 0.65 mg/dL  CrCl: 173.8 mL/min  Renal replacement: Not on dialysis  Days of Therapy: 1  Current Dose: 2000 mg iv q 12 hrs  Vancomycin Plan:  New Dosin mg iv q 12 hrs  Estimated AUC: 509 mcg*hr/mL  Estimated Trough: 13.2 mcg/mL  Next Level: 0600 10/08/24  Renal Function Monitoring: Daily BMP and UOP  Pharmacy will continue to follow closely for s/sx of nephrotoxicity, infusion reactions and appropriateness of therapy.  BMP and CBC will be ordered per protocol. We will continue to follow the patient’s culture results and clinical progress daily.    Compa Zuniga, Pharmacist

## 2024-10-07 NOTE — ANESTHESIA POSTPROCEDURE EVALUATION
Post-Op Assessment Note    CV Status:  Stable    Pain management: satisfactory to patient       Mental Status:  Sleepy and arousable   Hydration Status:  Euvolemic   PONV Controlled:  Controlled   Airway Patency:  Patent     Post Op Vitals Reviewed: Yes    No anethesia notable event occurred.    Staff: Anesthesiologist, CRNA               BP      Temp      Pulse     Resp      SpO2

## 2024-10-07 NOTE — PLAN OF CARE
Problem: PAIN - ADULT  Goal: Verbalizes/displays adequate comfort level or baseline comfort level  Description: Interventions:  - Encourage patient to monitor pain and request assistance  - Assess pain using appropriate pain scale  - Administer analgesics based on type and severity of pain and evaluate response  - Implement non-pharmacological measures as appropriate and evaluate response  - Consider cultural and social influences on pain and pain management  - Notify physician/advanced practitioner if interventions unsuccessful or patient reports new pain  Outcome: Progressing     Problem: INFECTION - ADULT  Goal: Absence or prevention of progression during hospitalization  Description: INTERVENTIONS:  - Assess and monitor for signs and symptoms of infection  - Monitor lab/diagnostic results  - Monitor all insertion sites, i.e. indwelling lines, tubes, and drains  - Monitor endotracheal if appropriate and nasal secretions for changes in amount and color  - [place]y appropriate cooling/warming therapies per order  - Administer medications as ordered  - Instruct and encourage patient and family to use good hand hygiene technique  - Identify and instruct in appropriate isolation precautions for identified infection/condition  Outcome: Progressing  Goal: Absence of fever/infection during neutropenic period  Description: INTERVENTIONS:  - Monitor WBC    Outcome: Progressing

## 2024-10-07 NOTE — CASE MANAGEMENT
Case Management Assessment & Discharge Planning Note    Patient name Roxann Johnson  Location /-01 MRN [id number]  : 1995 Date 10/7/2024       Current Admission Date: 10/6/2024  Current Admission Diagnosis:Breast pain, Breast abscess   Patient Active Problem List    Diagnosis Date Noted Date Diagnosed    Irregular periods 2023     Primary hypertension 2022     Morbid obesity with BMI of 40.0-44.9, adult (HCC)        LOS (days): 0  Geometric Mean LOS (GMLOS) (days):   Days to GMLOS:     OBJECTIVE:              Current admission status: Observation  Referral Reason: Other (D/C planning)    Preferred Pharmacy:   [id number] [place], [place] - [address] [place] [id number]  Phone: [phone] Fax: [phone]    Primary Care Provider: Kailash Curran MD    Primary Insurance: BLUE CROSS  Secondary Insurance:     ASSESSMENT:  Active Health Care Proxies    There are no active Health Care Proxies on file.       Advance Directives  Does patient have a Health Care POA?: No  Was patient offered paperwork?: Yes  Does patient currently have a Health Care decision maker?: No  Does patient have Advance Directives?: No  Was patient offered paperwork?: Yes  Primary Contact: Mariza Santiagoor         Readmission Root Cause  30 Day Readmission: No    Patient Information  Admitted from:: Home  Mental Status: Alert  During Assessment patient was accompanied by: Parent  Assessment information provided by:: Patient, Parent  Primary Caregiver: Self  Support Systems: Spouse/significant other, Parent, Family members  County of Residence: [place]  What city do you live in?: [place]  Living Arrangements: Lives w/ Spouse/significant other  Is patient a ?: No    Activities of Daily Living Prior to Admission  Functional Status: Independent  Completes ADLs independently?: Yes  Ambulates independently?: Yes  Does patient use assisted  devices?: No  Does patient currently own DME?: No  Does patient have a history of Outpatient Therapy (PT/OT)?: No  Does the patient have a history of Short-Term Rehab?: No  Does patient have a history of HHC?: No  Does patient currently have HHC?: No         Patient Information Continued  Income Source: Employed  Does patient have prescription coverage?: Yes  Does patient receive dialysis treatments?: No  Does patient have a history of substance abuse?: No  Does patient have a history of Mental Health Diagnosis?: No         Means of Transportation  Means of Transport to Appts:: Drives Self      Social Determinants of Health (SDOH)      Flowsheet Row Most Recent Value   Housing Stability    In the last 12 months, was there a time when you were not able to pay the mortgage or rent on time? N   In the past 12 months, how many times have you moved where you were living? 0   At any time in the past 12 months, were you homeless or living in a shelter (including now)? N   Transportation Needs    In the past 12 months, has lack of transportation kept you from medical appointments or from getting medications? no   In the past 12 months, has lack of transportation kept you from meetings, work, or from getting things needed for daily living? No   Food Insecurity    Within the past 12 months, you worried that your food would run out before you got the money to buy more. Never true   Within the past 12 months, the food you bought just didn't last and you didn't have money to get more. Never true   Utilities    In the past 12 months has the electric, gas, oil, or water company threatened to shut off services in your home? No            DISCHARGE DETAILS:    Discharge planning discussed with:: Pt, parent  Freedom of Choice: Yes     CM contacted family/caregiver?: Yes  Were Treatment Team discharge recommendations reviewed with patient/caregiver?: Yes  Did patient/caregiver verbalize understanding of patient care needs?: Yes  Were  patient/caregiver advised of the risks associated with not following Treatment Team discharge recommendations?: Yes    Contacts  Patient Contacts: Mariza Johnson  Relationship to Patient:: Family  Contact Method: In Person  Reason/Outcome: Continuity of Care, Discharge Planning    Requested Home Health Care         Is the patient interested in HHC at discharge?: No    DME Referral Provided  Referral made for DME?: No    Other Referral/Resources/Interventions Provided:  Interventions: Advanced Directives  Referral Comments: CM met with pt and mother at bedside to conduct assessment. Pt reported that she was IPTA, and family will transport upon D/C. CM provided pt with AD paperwork. Pt declined CM needs at this time. CM will continue to follow.    Would you like to participate in our [facility] service program?  : No - Declined    Treatment Team Recommendation: Home  Discharge Destination Plan:: Home  Transport at Discharge : Family

## 2024-10-07 NOTE — H&P
"H&P - Surgery-General   Name: Roxann Johnson 29 y.o. female I MRN: [id number]  Unit/Bed#: -[facility] I Date of Admission: 10/6/2024   Date of Service: 10/7/2024 I Hospital Day: 0     Assessment & Plan  Left breast abscess  30yo female PMH obesity, HTN presents with L breast abscess   -CT chest 10/6: \"Left breast 3.3 x 5.1 cm peripherally enhancing collection concerning for abscess collection with associated skin thickening.\"   -L breast erythema and induration noted involving the nipple, no obvious areas of fluctuance, no drainage, tender  -afebrile, VSS  -no leukocytosis    Plan:  -OR today for I&D left breast, possible wound vac placement  -continue IV abx, NPO  -warm compresses  -pain control  -will need outpatient follow up  -will discuss with Dr Wilkins     Primary hypertension  -home medication    History of Present Illness   Roxann Johnson is a 29 y.o. female who presents with L breast abscess. Patient states she noticed breast pain and erythema about a week ago and it has progressively gotten worse. Patient states she has been dealing with breast cellulitis previously. She has had multiple mammograms and scans. She says they do not have a cause. She has seen a breast surgeon in the past but they did not recommend surgery. She has noticed that the breast pain and swelling varies with her cycle. She has not had a breast abscess before. She is not breastfeeding and has not had children. Does not take blood thinners. She does smoke.     Review of Systems   Constitutional: Negative.    HENT: Negative.     Respiratory: Negative.     Cardiovascular: Negative.    Gastrointestinal: Negative.    Genitourinary: Negative.    Musculoskeletal: Negative.    Skin:  Positive for color change.   Neurological: Negative.    Hematological: Negative.    Psychiatric/Behavioral: Negative.       I have reviewed the patient's PMH, PSH, Social History, Family History, Meds, and Allergies    Objective :  Temp:  [96.6 °F (35.9 °C)-99.1 " °F (37.3 °C)] 98 °F (36.7 °C)  HR:  [86-99] 89  BP: (151-173)/() 171/108  Resp:  [18] 18  SpO2:  [93 %-98 %] 93 %  O2 Device: None (Room air)      Physical Exam  Constitutional:       Appearance: Normal appearance. She is obese.   HENT:      Head: Normocephalic and atraumatic.      Nose: Nose normal.      Mouth/Throat:      Mouth: Mucous membranes are moist.      Pharynx: Oropharynx is clear.   Eyes:      Conjunctiva/sclera: Conjunctivae normal.      Pupils: Pupils are equal, round, and reactive to light.   Cardiovascular:      Rate and Rhythm: Normal rate.   Pulmonary:      Effort: Pulmonary effort is normal.   Chest:   Breasts:     Left: Skin change and tenderness present.      Comments: Left breast erythema medial and around the nipple. There is an area of induration medial to the nipple. There is no drainage.   Abdominal:      General: Abdomen is flat.      Palpations: Abdomen is soft.   Musculoskeletal:         General: Normal range of motion.   Skin:     General: Skin is warm and dry.   Neurological:      General: No focal deficit present.   Psychiatric:         Mood and Affect: Mood normal.         Lab Results: I have reviewed the following results:  Recent Labs     10/06/24  1645 10/07/24  0441   WBC 7.99 8.29   HGB 13.0 12.7   HCT 39.4 38.1    168   SODIUM 138  --    K 4.0  --      --    CO2 26  --    BUN 8  --    CREATININE 0.65  --    GLUC 108  --    AST 32  --    ALT 63*  --    ALB 4.0  --    TBILI 0.37  --    ALKPHOS 58  --          VTE Pharmacologic Prophylaxis: Heparin  VTE Mechanical Prophylaxis: sequential compression device    Sharon Penaloza PA-C

## 2024-10-07 NOTE — QUICK NOTE
Patient's mother contacted by phone.  Short-term treatment plan explained.  All questions answered to her satisfaction.

## 2024-10-07 NOTE — PROGRESS NOTES
Roxann Johnson is a 29 y.o. female who is currently ordered Vancomycin IV with management by the Pharmacy Consult service.  Relevant clinical data and objective / subjective history reviewed.  Vancomycin Assessment:  Indication and Goal AUC/Trough: Soft tissue (goal -600, trough >10)  Clinical Status: stable  Micro:   pending  Renal Function:  SCr: 0.65 mg/dL from 10/6/24  CrCl: 173.2 mL/min using Scr from 10/6/24  Renal replacement: Not on dialysis  Days of Therapy: 2  Current Dose: 1500 mg iv q 12 hrs  Vancomycin Plan:  New Dosing: continue 1500 mg iv q 12 hrs  Estimated AUC: 509 mcg*hr/mL  Estimated Trough: 13.2 mcg/mL  Next Level: 10/8/24 @ 0600  Renal Function Monitoring: Daily BMP and UOP  Pharmacy will continue to follow closely for s/sx of nephrotoxicity, infusion reactions and appropriateness of therapy.  BMP and CBC will be ordered per protocol. We will continue to follow the patient’s culture results and clinical progress daily.    Vivian Hernandez, Pharmacist

## 2024-10-07 NOTE — OP NOTE
OPERATIVE REPORT  PATIENT NAME: Roxann Johnson    :  1995  MRN: [id number]  Pt Location: [facility] OR ROOM 02    SURGERY DATE: 10/7/2024    Surgeons and Role:     * Leroy Wilkins MD - Primary     * Sharon Penaloza PA-C - Assisting  The PA was necessary to provide expert assistance; i.e. in the form of providing optimal exposure with retraction, suturing, and assistance with dissection in order to perform the most efficient operation and in order to optimize patient safety in the abscence of a qualified surgical resident.    Preop Diagnosis:  Breast abscess [N61.1]    Post-Op Diagnosis Codes:     * Left breast abscess [N61.1]    Procedure(s):  Left - INCISION AND DRAINAGE (I&D) BREAST    Specimen(s):  ID Type Source Tests Collected by Time Destination   A :  Tissue Breast, Left ANAEROBIC CULTURE AND GRAM STAIN, CULTURE, TISSUE AND GRAM STAIN Leroy Wilkins MD 10/7/2024 1429        Estimated Blood Loss:   Minimal    Drains:  * No LDAs found *    Anesthesia Type:   Choice    Operative Indications:  Breast abscess [N61.1]  The patient is a 29-year-old female presenting with a left breast abscess for which incision and drainage is now indicated.    Operative Findings:  Large left breast abscess in a retroareolar location.    The abscess approached through a circumareolar incision made medially on the left breast.    Thick copious pus was drained from the wound.  Cultures were taken and sent to the laboratory.    The wound digitally explored.  Wound opened sufficiently to adequately drain the abscess.    Pulse lavage irrigation applied.    The wound measured at 3 cm x 2 cm x 5 cm in depth.    Wound VAC applied with deep white foam and superficial black foam and the procedure completed uneventfully.      Complications:   None    Procedure and Technique:  Patient taken to the operating room where she is palpate identified monitored and anesthetized.  She received antibiotics perioperatively.  Sequential  compression device used for DVT prophylaxis.  Skin prepped and draped.  Timeout performed.  Skin infiltrated 1% lidocaine local anesthesia with epinephrine.  Skin incised in a circumareolar fashion medially on the left breast.  Wound explored.  Abscess entered into and drained with widely.  Wound digitally explored.  Finger fracture used to break up any septations and loculations.  Pulse lavage irrigation applied.  The wound measured.  A wound VAC applied using deep white foam and superficial black foam.  Good seal achieved.  Patient extubated and taken recovery in stable condition.     I was present for the entire procedure.    Patient Disposition:  PACU              SIGNATURE: Leroy Wilkins MD  DATE: October 7, 2024  TIME: 3:00 PM

## 2024-10-07 NOTE — ASSESSMENT & PLAN NOTE
"30yo female PMH obesity, HTN presents with L breast abscess   -CT chest 10/6: \"Left breast 3.3 x 5.1 cm peripherally enhancing collection concerning for abscess collection with associated skin thickening.\"   -L breast erythema and induration noted involving the nipple, no obvious areas of fluctuance, no drainage, tender  -afebrile, VSS  -no leukocytosis    Plan:  -OR today for I&D left breast, possible wound vac placement  -continue IV abx, NPO  -warm compresses  -pain control  -will need outpatient follow up  -will discuss with Dr Wilkins     "

## 2024-10-07 NOTE — PLAN OF CARE
Problem: PAIN - ADULT  Goal: Verbalizes/displays adequate comfort level or baseline comfort level  Description: Interventions:  - Encourage patient to monitor pain and request assistance  - Assess pain using appropriate pain scale  - Administer analgesics based on type and severity of pain and evaluate response  - Implement non-pharmacological measures as appropriate and evaluate response  - Consider cultural and social influences on pain and pain management  - Notify physician/advanced practitioner if interventions unsuccessful or patient reports new pain  Outcome: Progressing     Problem: INFECTION - ADULT  Goal: Absence or prevention of progression during hospitalization  Description: INTERVENTIONS:  - Assess and monitor for signs and symptoms of infection  - Monitor lab/diagnostic results  - Monitor all insertion sites, i.e. indwelling lines, tubes, and drains  - Monitor endotracheal if appropriate and nasal secretions for changes in amount and color  - [place] appropriate cooling/warming therapies per order  - Administer medications as ordered  - Instruct and encourage patient and family to use good hand hygiene technique  - Identify and instruct in appropriate isolation precautions for identified infection/condition  Outcome: Progressing  Goal: Absence of fever/infection during neutropenic period  Description: INTERVENTIONS:  - Monitor WBC    Outcome: Progressing     Problem: SAFETY ADULT  Goal: Patient will remain free of falls  Description: INTERVENTIONS:  - Educate patient/family on patient safety including physical limitations  - Instruct patient to call for assistance with activity   - Consult OT/PT to assist with strengthening/mobility   - Keep Call bell within reach  - Keep bed low and locked with side rails adjusted as appropriate  - Keep care items and personal belongings within reach  - Initiate and maintain comfort rounds  - Make Fall Risk Sign visible to staff  - Offer Toileting every 2 Hours,  in advance of need  - Initiate/Maintain bed alarm  - Obtain necessary fall risk management equipment:   - Apply yellow socks and bracelet for high fall risk patients  - Consider moving patient to room near nurses station  Outcome: Progressing  Goal: Maintain or return to baseline ADL function  Description: INTERVENTIONS:  -  Assess patient's ability to carry out ADLs; assess patient's baseline for ADL function and identify physical deficits which impact ability to perform ADLs (bathing, care of mouth/teeth, toileting, grooming, dressing, etc.)  - Assess/evaluate cause of self-care deficits   - Assess range of motion  - Assess patient's mobility; develop plan if impaired  - Assess patient's need for assistive devices and provide as appropriate  - Encourage maximum independence but intervene and supervise when necessary  - Involve family in performance of ADLs  - Assess for home care needs following discharge   - Consider OT consult to assist with ADL evaluation and planning for discharge  - Provide patient education as appropriate  Outcome: Progressing  Goal: Maintains/Returns to pre admission functional level  Description: INTERVENTIONS:  - Perform AM-PAC 6 Click Basic Mobility/ Daily Activity assessment daily.  - Set and communicate daily mobility goal to care team and patient/family/caregiver.   - Collaborate with rehabilitation services on mobility goals if consulted  - Perform Range of Motion 2 times a day.  - Reposition patient every 2 hours.  - Dangle patient 2 times a day  - Stand patient 2 times a day  - Ambulate patient 2 times a day  - Out of bed to chair 2 times a day   - Out of bed for meals 2 times a day  - Out of bed for toileting  - Record patient progress and toleration of activity level   Outcome: Progressing     Problem: DISCHARGE PLANNING  Goal: Discharge to home or other facility with appropriate resources  Description: INTERVENTIONS:  - Identify barriers to discharge w/patient and caregiver  -  Arrange for needed discharge resources and transportation as appropriate  - Identify discharge learning needs (meds, wound care, etc.)  - Arrange for interpretive services to assist at discharge as needed  - Refer to Case Management Department for coordinating discharge planning if the patient needs post-hospital services based on physician/advanced practitioner order or complex needs related to functional status, cognitive ability, or social support system  Outcome: Progressing     Problem: Knowledge Deficit  Goal: Patient/family/caregiver demonstrates understanding of disease process, treatment plan, medications, and discharge instructions  Description: Complete learning assessment and assess knowledge base.  Interventions:  - Provide teaching at level of understanding  - Provide teaching via preferred learning methods  Outcome: Progressing

## 2024-10-07 NOTE — NURSING NOTE
Pt admitted to room 309, alert and oriented, pleasant and cooperative, SO at bedside. Assessment benign except for left breast, red, warm, tender with some swelling. Vanco infusing then NS at 100/hr. NPO maintained. Call bell near, lilli monitor in place and active

## 2024-10-07 NOTE — CASE MANAGEMENT
Case Management Discharge Planning Note    Patient name Roxann Santiagoor  Location /-01 MRN [id number]  : 1995 Date 10/7/2024       Current Admission Date: 10/6/2024  Current Admission Diagnosis:Left breast abscess   Patient Active Problem List    Diagnosis Date Noted Date Diagnosed    Left breast abscess 10/07/2024     Sleep apnea 10/07/2024     Irregular periods 2023     Primary hypertension 2022     Morbid obesity with BMI of 40.0-44.9, adult (HCC)        LOS (days): 0  Geometric Mean LOS (GMLOS) (days):   Days to GMLOS:     OBJECTIVE:  Risk of Unplanned Readmission Score: 6.65         Current admission status: Inpatient   Preferred Pharmacy:   [facility] [address] [address]  Phone: [phone] Fax: [phone]    Primary Care Provider: Kailash Curran MD    Primary Insurance: BLUE CROSS  Secondary Insurance:     DISCHARGE DETAILS:                                          Other Referral/Resources/Interventions Provided:  Interventions: DME  Referral Comments: CM received wound vac order, and submitted for ins auth. CM will provide to pt upon approval.

## 2024-10-07 NOTE — ANESTHESIA PREPROCEDURE EVALUATION
Procedure:  INCISION AND DRAINAGE (I&D) BREAST (Left: Breast)    Relevant Problems   ANESTHESIA (within normal limits)  No hx GA, no known issues in family, no blood/ muscle disorders in family      CARDIO   (+) Primary hypertension   (-) Chest pain   (-) MI (myocardial infarction) (HCC)      ENDO (within normal limits)      GI/HEPATIC (within normal limits)  NPO confirmed  BMI 48.6      /RENAL (within normal limits)      NEURO/PSYCH (within normal limits)   (-) CVA (cerebral vascular accident) (HCC)   (-) Seizures (HCC)      PULMONARY   (+) Sleep apnea (Likely RUBINA but no sleep study. Reports partner hears her snoring loudly with period of apnea)   (-) URI (upper respiratory infection)      No Known Allergies    Social History     Tobacco Use    Smoking status: Every Day     Types: Cigarettes     Start date: 2019    Smokeless tobacco: Never    Tobacco comments:     3 cigarettes daily   Vaping Use    Vaping status: Former    Quit date: 6/2/2020   Substance Use Topics    Alcohol use: Yes     Comment: socially    Drug use: Never     Current Outpatient Medications   Medication Instructions    diclofenac sodium (VOLTAREN) 50 mg, Oral, 2 times daily, With food    hydrocortisone 2.5 % ointment 2 times daily    losartan (COZAAR) 50 mg, Oral, Daily    methocarbamol (ROBAXIN) 500 mg, Oral, 4 times daily    Multiple Vitamin (MULTIVITAMIN) tablet 1 tablet, Oral, Daily     Lab Results   Component Value Date    WBC 8.29 10/07/2024    HGB 12.7 10/07/2024    HCT 38.1 10/07/2024     10/07/2024    SODIUM 138 10/06/2024    K 4.0 10/06/2024     10/06/2024    CO2 26 10/06/2024    BUN 8 10/06/2024    CREATININE 0.65 10/06/2024    GLUC 108 10/06/2024    HGBA1C 5.6 03/14/2024    AST 32 10/06/2024    ALT 63 (H) 10/06/2024    ALKPHOS 58 10/06/2024    TBILI 0.37 10/06/2024    ALB 4.0 10/06/2024    PROTIME 13.8 09/17/2024    PTT 29 09/17/2024    INR 0.99 09/17/2024     Vitals:    10/07/24 1242   BP: (!) 174/112   Pulse: 88    Resp: 18   Temp: 98.4 °F (36.9 °C)   SpO2: 96%     EKG 9/17/24  Normal sinus rhythm  Minimal voltage criteria for LVH, may be normal variant  No previous ECGs available  Confirmed by Summer Baptiste ([id number]) on 9/17/2024 1:47:48 PM    Physical Exam    Airway    Mallampati score: III  TM Distance: <3 FB  Neck ROM: full     Dental   Comment: Denies loose/chipped teeth, No notable dental hx     Cardiovascular  Rhythm: regular, Rate: normal    Pulmonary   Breath sounds clear to auscultation    Other Findings  post-pubertal.      Anesthesia Plan  ASA Score- 3     Anesthesia Type- general with ASA Monitors.         Additional Monitors:     Airway Plan: LMA.           Plan Factors-Exercise tolerance (METS): >4 METS.    Chart reviewed. EKG reviewed.  Existing labs reviewed. Patient summary reviewed.    Patient is a current smoker. Patient not instructed to abstain from smoking on day of procedure. Patient did not smoke on day of surgery.            Induction- intravenous.    Postoperative Plan- Plan for postoperative opioid use.     Perioperative Resuscitation Plan - Level 1 - Full Code.       Informed Consent- Anesthetic plan and risks discussed with patient.  I personally reviewed this patient with the CRNA. Discussed and agreed on the Anesthesia Plan with the CRNA..    Medication Administration - last 24 hours from 10/06/2024 1348 to 10/07/2024 1348         Date/Time Order Dose Route Action Action by     10/06/2024 1640 EDT oxyCODONE-acetaminophen (PERCOCET) 5-325 mg per tablet 1 tablet 1 tablet Oral Given Ronni Ham RN     10/06/2024 1730 EDT iohexol (OMNIPAQUE) 350 MG/ML injection (MULTI-DOSE) 100 mL 85 mL Intravenous Given Lisette Barrow     10/06/2024 1959 EDT cefTRIAXone (ROCEPHIN) IVPB (premix in dextrose) 1,000 mg 50 mL 0 mg Intravenous Stopped Rosalba Meredith RN     10/06/2024 1924 EDT cefTRIAXone (ROCEPHIN) IVPB (premix in dextrose) 1,000 mg 50 mL 1,000 mg Intravenous New Bag Ronni Ham RN     10/06/2024  2144 EDT vancomycin (VANCOCIN) 1,250 mg in sodium chloride 0.9 % 250 mL IVPB 0 mg Intravenous Stopped Rosalba Meredith RN     10/06/2024 2012 EDT vancomycin (VANCOCIN) 1,250 mg in sodium chloride 0.9 % 250 mL IVPB 1,250 mg Intravenous New Bag Rosalba Meredith RN     10/07/2024 0819 EDT sodium chloride 0.9 % infusion 100 mL/hr Intravenous New Bag Kiley Quigley RN     10/07/2024 0549 EDT sodium chloride 0.9 % infusion 100 mL/hr Intravenous Rate/Dose Verify Rosalba Meredith RN     10/06/2024 2200 EDT sodium chloride 0.9 % infusion 100 mL/hr Intravenous Rate/Dose Verify Rosalba Meredith RN     10/06/2024 2012 EDT sodium chloride 0.9 % infusion 100 mL/hr Intravenous New Bag Rosalba Meredith RN     10/07/2024 1132 EDT morphine injection 2 mg 2 mg Intravenous Given Kiley Quigley RN     10/07/2024 0820 EDT morphine injection 2 mg 2 mg Intravenous Given Kiley Quigley RN     10/06/2024 2218 EDT acetaminophen (Ofirmev) injection 1,000 mg 0 mg Intravenous Stopped Rosalba Meredith RN     10/06/2024 2203 EDT acetaminophen (Ofirmev) injection 1,000 mg 1,000 mg Intravenous New Bag Rosalba Meredith RN     10/07/2024 0619 EDT heparin (porcine) subcutaneous injection 7,500 Units 7,500 Units Subcutaneous Given Rosalba Meredith RN     10/06/2024 2200 EDT heparin (porcine) subcutaneous injection 7,500 Units 0 Units Subcutaneous Hold Rosalba Meredith RN     10/07/2024 0819 EDT vancomycin (VANCOCIN) 1500 mg in sodium chloride 0.9% 250 mL IVPB 1,500 mg Intravenous New Bag Kiley Quigley RN     10/07/2024 0939 EDT losartan (COZAAR) tablet 50 mg 50 mg Oral Given Kiley Quigley RN

## 2024-10-07 NOTE — UTILIZATION REVIEW
Initial Clinical Review    WAS OBSERVATION 10/6/24  CONVERTED TO INPATIENT ADMISSION 10/7/24 DUE TO CONTINUED STAY REQUIRED TO CARE FOR PATIENT WITH LEFT BREAST CELLULITIS.  .       Admission: Date/Time/Statement:   Admission Orders (From admission, onward)       Ordered        10/07/24 1123  INPATIENT ADMISSION  Once            10/06/24 1915  Place in Observation  Once                          Orders Placed This Encounter   Procedures                 INPATIENT ADMISSION      Standing Status:   Standing      Number of Occurrences:   1      Order Specific Question:   Level of Care      Answer:   Med Surg [16]      Order Specific Question:   Estimated length of stay      Answer:   More than 2 Midnights      Order Specific Question:   Certification      Answer:   I certify that inpatient services are medically necessary for this patient for a duration of greater than two midnights. See H&P and MD Progress Notes for additional information about the patient's course of treatment.     ED Arrival Information       Expected   -    Arrival   10/6/2024 16:15    Acuity   Urgent              Means of arrival   Walk-In    Escorted by   [place]    Service   Surgery-General    Admission type   Emergency              Arrival complaint   left breast pain             Chief Complaint   Patient presents with    Breast Pain     Left breast x 2 years, worse over the past week, and now her nipple is inverted.  In the past she was told it was a clogged duct. Patient has never been pregnant and never breast fed. No fevers at home.  Most recent mammogram noted that it was abnormal but only recommended repeat US in 6 months.        Initial Presentation: 29 y.o. female presents to the Ed with complaints of left breast erythema and pain. Pt states she has had chronic tenderness and mass to the area for the past few years. Had an abnormal mammogram but only told to have f/u US in 6 months. Pt now reports worsening redness and pain and now left  nipple is inverted which pt states is new. BP in /111 and pain requiring percocet x 1 dose. Pt states she has  a strong family history of breast cancer. Pt given IV antibiotic, Ceftriaxone in the ED. On exam: + pain in left breast, pt has 8 cm palpable fluctuance in the left breast with overlying erythema and tenderness. Abnormal labs: ALT 63, CRP 21.5. CT chest w/ contrast Left breast 3.3 x 5.1 cm peripherally enhancing collection concerning for abscess collection with associated skin thickening. Recommend surgical evaluation. Plan : admit pt for Cellulitis of the left breast, administer IV ABX, surgery consult.    Converted to IP 10/7/24.     Date: 10/7/24   Day 2: General Surgery Consult: OR today for I&D left breast, possible wound vac placement. Make NPO, Continnue IV ABX, warm compresses, pain control.     10/7/24 OP NOTE: Left - INCISION AND DRAINAGE (I&D) BREAST     Operative Findings:  Large left breast abscess in a retroareolar location.     The abscess approached through a circumareolar incision made medially on the left breast.     Thick copious pus was drained from the wound.  Cultures were taken and sent to the laboratory.     The wound digitally explored.  Wound opened sufficiently to adequately drain the abscess.     Pulse lavage irrigation applied.     The wound measured at 3 cm x 2 cm x 5 cm in depth.     Wound VAC applied with deep white foam and superficial black foam and the procedure completed uneventfully.     10/8/24 Post op note General Surgery: POD1 s/p I&D L breast abscess, wound vac placement. Left breast erythema and induration involving there nipple present, wound vac intact and functioning, pt reports less pain  today.  Will require Wound vac dressing change tomorrow at bedside. Waiting for home wound vac approval, continue IV ABX Rocephin Q 24 hrs and IV Vancomycin every 8 hours , follow up on culture results, continue pain control. WBC elevated today at 14.2. CM working on  obtaining home care services and wound vac for at home.               ED Treatment-Medication Administration from 10/06/2024 1615 to 10/06/2024 1936         Date/Time Order Dose Route Action     10/06/2024 1640 oxyCODONE-acetaminophen (PERCOCET) 5-325 mg per tablet 1 tablet 1 tablet Oral Given     10/06/2024 1730 iohexol (OMNIPAQUE) 350 MG/ML injection (MULTI-DOSE) 100 mL 85 mL Intravenous Given     10/06/2024 1924 cefTRIAXone (ROCEPHIN) IVPB (premix in dextrose) 1,000 mg 50 mL 1,000 mg Intravenous New Bag            Scheduled Medications:  heparin (porcine), 7,500 Units, Subcutaneous, Q8H KATHRYN  losartan, 50 mg, Oral, Daily  vancomycin, 1,500 mg, Intravenous, Q12H      Continuous IV Infusions:  sodium chloride, 100 mL/hr, Intravenous, Continuous dc on 10/8 0724      PRN Meds:  acetaminophen, 1,000 mg, Intravenous, Q6H PRN  morphine injection, 2 mg, Intravenous, Q3H PRN x 2 doses 10/7  ondansetron, 4 mg, Intravenous, Q6H PRN  hydrALAZINE (APRESOLINE) injection 10 mg x 1 dose 10/7 at 1850  Dose: 10 mg  Freq: Every 6 hours PRN Route: IV  PRN Reason: high blood pressure  PRN Comment: SBP>160  Start: 10/07/24 1807       ED Triage Vitals   Temperature Pulse Respirations Blood Pressure SpO2 Pain Score   10/06/24 1624 10/06/24 1624 10/06/24 1624 10/06/24 1624 10/06/24 1624 10/06/24 1640   99.1 °F (37.3 °C) 90 18 (!) 170/111 98 % 10 - Worst Possible Pain     Weight (last 2 days)       Date/Time Weight    10/06/24 1942 132 (291.89)    10/06/24 1927 134 (295.42)            Vital Signs (last 3 days)       Date/Time Temp Pulse Resp BP MAP (mmHg) SpO2 Calculated FIO2 (%) - Nasal Cannula O2 Flow Rate (L/min) Nasal Cannula O2 Flow Rate (L/min) O2 Device Patient Position - Orthostatic VS Pain    10/08/24 0835 -- -- -- -- -- -- -- -- -- -- -- 5    10/08/24 0700 97.8 °F (36.6 °C) -- 20 -- -- -- -- -- -- -- -- --    10/08/24 06:59:13 -- 92 -- 156/103 121 93 % -- -- -- -- -- --    10/08/24 06:58:41 -- 95 -- 156/103 121 92 % -- -- -- --  -- --    10/08/24 03:00:08 97.8 °F (36.6 °C) 106 18 137/90 106 93 % -- -- -- -- -- --    10/07/24 2300 97.6 °F (36.4 °C) 110 18 143/98 113 91 % -- -- -- -- -- --    10/07/24 2235 -- -- -- -- -- -- -- -- -- -- -- 7    10/07/24 2052 -- 113 -- 143/100 114 94 % -- -- -- -- -- --    10/07/24 1930 -- -- -- -- -- 92 % -- -- -- None (Room air) -- 5    10/07/24 18:51:18 -- 115 -- 168/98 121 92 % -- -- -- -- -- --    10/07/24 1803 -- 98 -- -- -- -- -- -- -- -- -- --    10/07/24 1754 -- -- -- -- -- -- -- -- -- -- -- 7    10/07/24 1740 97.4 °F (36.3 °C) 107 22 163/106 -- 92 % -- -- -- -- -- --    10/07/24 16:40:02 -- 96 -- 147/108 121 90 % -- -- -- -- -- --    10/07/24 15:44:06 98.1 °F (36.7 °C) 96 -- 155/107 123 -- -- -- -- -- -- --    10/07/24 1520 -- 105 26 145/69 99 93 % 28 -- 2 L/min Nasal cannula -- --    10/07/24 1510 -- 109 25 145/69 99 94 % 28 -- 2 L/min Nasal cannula -- --    10/07/24 1458 -- 116 24 182/80 115 96 % -- 4 L/min -- Simple mask -- 7    10/07/24 1242 98.4 °F (36.9 °C) 88 18 174/112 -- 96 % -- -- -- None (Room air) -- 7    10/07/24 11:39:37 -- 87 -- 162/100 121 92 % -- -- -- -- -- --    10/07/24 1132 -- -- -- -- -- -- -- -- -- -- -- 10 - Worst Possible Pain    10/07/24 0819 -- -- -- -- -- -- -- -- -- -- -- 10 - Worst Possible Pain    10/07/24 08:03:47 -- 89 -- 171/108 129 93 % -- -- -- -- -- --    10/07/24 06:53:41 98 °F (36.7 °C) 86 18 173/114 134 95 % -- -- -- -- -- --    10/06/24 19:48:05 96.6 °F (35.9 °C) 86 18 159/94 116 93 % -- -- -- -- -- --    10/06/24 1940 -- -- -- -- -- -- -- -- -- -- -- 3    10/06/24 1914 -- 87 -- 164/99 124 97 % -- -- -- -- -- --    10/06/24 1859 -- 90 -- 152/87 114 94 % -- -- -- -- -- --    10/06/24 1844 -- 86 -- 151/72 103 94 % -- -- -- -- -- --    10/06/24 1829 -- 86 -- 160/80 111 93 % -- -- -- -- -- --    10/06/24 1828 -- -- -- -- -- -- -- -- -- -- -- 7    10/06/24 1641 -- 99 -- 162/81 114 97 % -- -- -- -- -- --    10/06/24 1640 -- -- -- -- -- -- -- -- -- -- -- 10 - Worst  Possible Pain    10/06/24 1626 -- -- -- -- -- -- -- -- -- None (Room air) -- --    10/06/24 1624 99.1 °F (37.3 °C) 90 18 170/111 136 98 % -- -- -- None (Room air) Sitting --              Pertinent Labs/Diagnostic Test Results:   Radiology:  CT chest with contrast   Final Interpretation by Shun Pollard MD (10/06 1835)   Left breast 3.3 x 5.1 cm peripherally enhancing collection concerning for abscess collection with associated skin thickening. Recommend surgical evaluation.      The study was marked in EPIC for significant notification.                  Workstation performed: [id number]           Cardiology:  No orders to display     GI:  No orders to display           Results from last 7 days   Lab Units 10/08/24  0438 10/07/24  0441 10/06/24  1645   WBC Thousand/uL 14.24* 8.29 7.99   HEMOGLOBIN g/dL 13.1 12.7 13.0   HEMATOCRIT % 39.0 38.1 39.4   PLATELETS Thousands/uL 215 168 163   TOTAL NEUT ABS Thousands/µL 12.19* 5.46 5.22         Results from last 7 days   Lab Units 10/08/24  0438 10/06/24  1645   SODIUM mmol/L 136 138   POTASSIUM mmol/L 4.3 4.0   CHLORIDE mmol/L 107 106   CO2 mmol/L 23 26   ANION GAP mmol/L 6 6   BUN mg/dL 10 8   CREATININE mg/dL 0.62 0.65   EGFR ml/min/1.73sq m 121 119   CALCIUM mg/dL 9.0 8.9     Results from last 7 days   Lab Units 10/06/24  1645   AST U/L 32   ALT U/L 63*   ALK PHOS U/L 58   TOTAL PROTEIN g/dL 6.5   ALBUMIN g/dL 4.0   TOTAL BILIRUBIN mg/dL 0.37         Results from last 7 days   Lab Units 10/08/24  0438 10/06/24  1645   GLUCOSE RANDOM mg/dL 156* 108                         Results from last 7 days   Lab Units 10/06/24  1645   CRP mg/L 21.5*   SED RATE mm/hour 6                         Results from last 7 days   Lab Units 10/08/24  0438   VANCOMYCIN TR ug/mL 7.1*       Past Medical History:   Diagnosis Date    Hypertension     Morbid obesity with BMI of 40.0-44.9, adult (HCC)     Sleep apnea     possible undiagnosed RUBINA-snores frequently     Present on Admission:   Left  breast abscess   Primary hypertension   Sleep apnea      Admitting Diagnosis: Breast pain [N64.4]  Breast abscess [N61.1]  Age/Sex: 29 y.o. female    Network Utilization Review Department  ATTENTION: Please call with any questions or concerns to [phone] and carefully listen to the prompts so that you are directed to the right person. All voicemails are confidential.   For Discharge needs, contact Care Management DC Support Team at [phone] opt. [phone]  Send all requests for admission clinical reviews, approved or denied determinations and any other requests to dedicated fax number below belonging to the campus where the patient is receiving treatment. List of dedicated fax numbers for the Facilities:  FACILITY NAME UR FAX NUMBER   ADMISSION DENIALS (Administrative/Medical Necessity) [phone]   DISCHARGE SUPPORT TEAM (NETWORK) [phone]   PARENT CHILD HEALTH (Maternity/NICU/Pediatrics) [phone]   [facility] [phone]   [facility] [phone]   [facility] [phone]   [facility] [phone]   [facility] [phone]   [facility] [phone]   [facility] [phone]   [facility] [phone]   [facility] [phone]   [facility] [phone]   [facility] [phone]   [facility] [phone]

## 2024-10-08 LAB
ANION GAP SERPL CALCULATED.3IONS-SCNC: 6 MMOL/L (ref 4–13)
BASOPHILS # BLD AUTO: 0.03 THOUSANDS/ΜL (ref 0–0.1)
BASOPHILS NFR BLD AUTO: 0 % (ref 0–1)
BUN SERPL-MCNC: 10 MG/DL (ref 5–25)
CALCIUM SERPL-MCNC: 9 MG/DL (ref 8.4–10.2)
CHLORIDE SERPL-SCNC: 107 MMOL/L (ref 96–108)
CO2 SERPL-SCNC: 23 MMOL/L (ref 21–32)
CREAT SERPL-MCNC: 0.62 MG/DL (ref 0.6–1.3)
EOSINOPHIL # BLD AUTO: 0 THOUSAND/ΜL (ref 0–0.61)
EOSINOPHIL NFR BLD AUTO: 0 % (ref 0–6)
ERYTHROCYTE [DISTWIDTH] IN BLOOD BY AUTOMATED COUNT: 13.1 % (ref 11.6–15.1)
GFR SERPL CREATININE-BSD FRML MDRD: 121 ML/MIN/1.73SQ M
GLUCOSE SERPL-MCNC: 156 MG/DL (ref 65–140)
HCT VFR BLD AUTO: 39 % (ref 34.8–46.1)
HGB BLD-MCNC: 13.1 G/DL (ref 11.5–15.4)
IMM GRANULOCYTES # BLD AUTO: 0.14 THOUSAND/UL (ref 0–0.2)
IMM GRANULOCYTES NFR BLD AUTO: 1 % (ref 0–2)
LYMPHOCYTES # BLD AUTO: 1.34 THOUSANDS/ΜL (ref 0.6–4.47)
LYMPHOCYTES NFR BLD AUTO: 9 % (ref 14–44)
MCH RBC QN AUTO: 29.7 PG (ref 26.8–34.3)
MCHC RBC AUTO-ENTMCNC: 33.6 G/DL (ref 31.4–37.4)
MCV RBC AUTO: 88 FL (ref 82–98)
MONOCYTES # BLD AUTO: 0.54 THOUSAND/ΜL (ref 0.17–1.22)
MONOCYTES NFR BLD AUTO: 4 % (ref 4–12)
NEUTROPHILS # BLD AUTO: 12.19 THOUSANDS/ΜL (ref 1.85–7.62)
NEUTS SEG NFR BLD AUTO: 86 % (ref 43–75)
NRBC BLD AUTO-RTO: 0 /100 WBCS
PLATELET # BLD AUTO: 215 THOUSANDS/UL (ref 149–390)
PMV BLD AUTO: 10.3 FL (ref 8.9–12.7)
POTASSIUM SERPL-SCNC: 4.3 MMOL/L (ref 3.5–5.3)
RBC # BLD AUTO: 4.41 MILLION/UL (ref 3.81–5.12)
SODIUM SERPL-SCNC: 136 MMOL/L (ref 135–147)
VANCOMYCIN TROUGH SERPL-MCNC: 7.1 UG/ML (ref 10–20)
WBC # BLD AUTO: 14.24 THOUSAND/UL (ref 4.31–10.16)

## 2024-10-08 PROCEDURE — 80202 ASSAY OF VANCOMYCIN: CPT | Performed by: SURGERY

## 2024-10-08 PROCEDURE — 80048 BASIC METABOLIC PNL TOTAL CA: CPT | Performed by: SURGERY

## 2024-10-08 PROCEDURE — 99024 POSTOP FOLLOW-UP VISIT: CPT | Performed by: SURGERY

## 2024-10-08 PROCEDURE — 85025 COMPLETE CBC W/AUTO DIFF WBC: CPT

## 2024-10-08 RX ADMIN — OXYCODONE HYDROCHLORIDE 10 MG: 10 TABLET ORAL at 20:44

## 2024-10-08 RX ADMIN — ACETAMINOPHEN 650 MG: 325 TABLET ORAL at 08:35

## 2024-10-08 RX ADMIN — LOSARTAN POTASSIUM 50 MG: 50 TABLET, FILM COATED ORAL at 08:35

## 2024-10-08 RX ADMIN — ONDANSETRON 4 MG: 2 INJECTION INTRAMUSCULAR; INTRAVENOUS at 10:23

## 2024-10-08 RX ADMIN — HEPARIN SODIUM 7500 UNITS: 5000 INJECTION, SOLUTION INTRAVENOUS; SUBCUTANEOUS at 14:46

## 2024-10-08 RX ADMIN — CEFTRIAXONE 1000 MG: 1 INJECTION, SOLUTION INTRAVENOUS at 14:46

## 2024-10-08 RX ADMIN — VANCOMYCIN HYDROCHLORIDE 1250 MG: 1 INJECTION, POWDER, LYOPHILIZED, FOR SOLUTION INTRAVENOUS at 15:40

## 2024-10-08 RX ADMIN — HEPARIN SODIUM 7500 UNITS: 5000 INJECTION, SOLUTION INTRAVENOUS; SUBCUTANEOUS at 05:49

## 2024-10-08 RX ADMIN — VANCOMYCIN HYDROCHLORIDE 1250 MG: 1 INJECTION, POWDER, LYOPHILIZED, FOR SOLUTION INTRAVENOUS at 08:35

## 2024-10-08 RX ADMIN — OXYCODONE HYDROCHLORIDE 10 MG: 10 TABLET ORAL at 10:23

## 2024-10-08 RX ADMIN — HEPARIN SODIUM 7500 UNITS: 5000 INJECTION, SOLUTION INTRAVENOUS; SUBCUTANEOUS at 22:21

## 2024-10-08 RX ADMIN — SODIUM CHLORIDE 100 ML/HR: 0.9 INJECTION, SOLUTION INTRAVENOUS at 04:27

## 2024-10-08 NOTE — CASE MANAGEMENT
Case Management Discharge Planning Note    Patient name Roxann SHAY Alex  Location /-01 MRN [id number]  : 1995 Date 10/8/2024       Current Admission Date: 10/6/2024  Current Admission Diagnosis:Left breast abscess   Patient Active Problem List    Diagnosis Date Noted Date Diagnosed    Left breast abscess 10/07/2024     Sleep apnea 10/07/2024     Irregular periods 2023     Primary hypertension 2022     Morbid obesity with BMI of 40.0-44.9, adult (HCC)        LOS (days): 1  Geometric Mean LOS (GMLOS) (days):   Days to GMLOS:     OBJECTIVE:  Risk of Unplanned Readmission Score: 6.43         Current admission status: Inpatient   Preferred Pharmacy:   [facility] [id number] [address]  Phone: [phone] Fax: [phone]    Primary Care Provider: Kailash Curran MD    Primary Insurance: BLUE CROSS  Secondary Insurance:     DISCHARGE DETAILS:                                          Other Referral/Resources/Interventions Provided:  Interventions: DME  Referral Comments: [facility] was notified by Surgery that pt will need HHC for wound vac changes M, W, F. CM submitted ref for HHC via [employer]. CM received auth approval for wound vac. [facility] delivered wound vac to pt, and obtained pt signature on delivery ticket. [facility] faxed signed delivery ticket to [employer] & D/C support. CM provided pt with HHC choice list. Pt declined HHC, and requested to go to office for wound vac changes. CM notified surgery provider, and will coninue to follow.    Would you like to participate in our [facility] service program?  : No - Declined

## 2024-10-08 NOTE — CASE MANAGEMENT
[facility] has received APPROVAL to release wound vac to patient.    CM confirmed patient is ready to discharge. Assigned Vac # :  [id number]     Proof of delivery PPW given to Care Manager: Lilia Jorgensen to deliver to patient    Please reach out to CM for updates on any clinical information.

## 2024-10-08 NOTE — CASE MANAGEMENT
[facility] has received request for [employer] wound vac from Care Manager.  Request submitted via [employer] website.   Pending order #:  [id number]     Care Manager notified: Pineda Jorgensen    Please reach out to CM for updates on any clinical information.

## 2024-10-08 NOTE — ASSESSMENT & PLAN NOTE
"30yo female PMH obesity, HTN presents with L breast abscess now POD1 s/p I&D L breast abscess, wound vac placement   -CT chest 10/6: \"Left breast 3.3 x 5.1 cm peripherally enhancing collection concerning for abscess collection with associated skin thickening.\"   -L breast erythema and induration noted involving the nipple, wound intact and functioning; patient reports less breast pain  -afebrile, VSS  -WBC 14.2 (8.2), likely reactive to surgery  -Hgb stable  -elytes unremarkable    Plan:  -wound vac change tomorrow at the bedside; waiting for home vac approval  -continue IV abx, diet as tolerated  -follow up culture results  -pain control  -will need outpatient follow up  -will discuss with Dr Wilkins     "

## 2024-10-08 NOTE — ANESTHESIA POSTPROCEDURE EVALUATION
Post-Op Assessment Note            No anethesia notable event occurred.            Last Filed PACU Vitals:  Vitals Value Taken Time   Temp     Pulse 105 10/07/24 1520   /69 10/07/24 1520   Resp 26 10/07/24 1520   SpO2 93 % 10/07/24 1520       Modified Valentina:  Activity: 2 (10/7/2024  3:20 PM)  Respiration: 2 (10/7/2024  3:20 PM)  Circulation: 2 (10/7/2024  3:20 PM)  Consciousness: 1 (10/7/2024  3:20 PM)  Oxygen Saturation: 1 (10/7/2024  3:20 PM)  Modified Valentina Score: 8 (10/7/2024  3:20 PM)

## 2024-10-08 NOTE — PLAN OF CARE
Problem: PAIN - ADULT  Goal: Verbalizes/displays adequate comfort level or baseline comfort level  Description: Interventions:  - Encourage patient to monitor pain and request assistance  - Assess pain using appropriate pain scale  - Administer analgesics based on type and severity of pain and evaluate response  - Implement non-pharmacological measures as appropriate and evaluate response  - Consider cultural and social influences on pain and pain management  - Notify physician/advanced practitioner if interventions unsuccessful or patient reports new pain  Outcome: Progressing     Problem: INFECTION - ADULT  Goal: Absence or prevention of progression during hospitalization  Description: INTERVENTIONS:  - Assess and monitor for signs and symptoms of infection  - Monitor lab/diagnostic results  - Monitor all insertion sites, i.e. indwelling lines, tubes, and drains  - Monitor endotracheal if appropriate and nasal secretions for changes in amount and color  - [place] appropriate cooling/warming therapies per order  - Administer medications as ordered  - Instruct and encourage patient and family to use good hand hygiene technique  - Identify and instruct in appropriate isolation precautions for identified infection/condition  Outcome: Progressing

## 2024-10-08 NOTE — PROGRESS NOTES
"Progress Note - Surgery-General   Name: Roxann Johnson 29 y.o. female I MRN: [id number]  Unit/Bed#: [facility] I Date of Admission: 10/6/2024   Date of Service: 10/8/2024 I Hospital Day: 1    Assessment & Plan  Left breast abscess  28yo female PMH obesity, HTN presents with L breast abscess now POD1 s/p I&D L breast abscess, wound vac placement   -CT chest 10/6: \"Left breast 3.3 x 5.1 cm peripherally enhancing collection concerning for abscess collection with associated skin thickening.\"   -L breast erythema and induration noted involving the nipple, wound intact and functioning; patient reports less breast pain  -afebrile, VSS  -WBC 14.2 (8.2), likely reactive to surgery  -Hgb stable  -elytes unremarkable    Plan:  -wound vac change tomorrow at the bedside; waiting for home vac approval  -continue IV abx, diet as tolerated  -follow up culture results  -pain control  -will need outpatient follow up  -will discuss with Dr Wilkins     Primary hypertension  -home medication  Morbid obesity with BMI of 40.0-44.9, adult ([facility])  -counseled on weight loss  Sleep apnea          Subjective   Patient reports less left breast pain today. No fever, chills.     Objective :  Temp:  [97.4 °F (36.3 °C)-98.4 °F (36.9 °C)] 97.8 °F (36.6 °C)  HR:  [] 92  BP: (137-182)/() 156/103  Resp:  [18-26] 20  SpO2:  [90 %-96 %] 93 %  O2 Device: None (Room air)  Nasal Cannula O2 Flow Rate (L/min):  [2 L/min] 2 L/min    I/O         10/06 0701  10/07 0700 10/07 0701  10/08 0700 10/08 0701  10/09 0700    P.O. 0 120 240    I.V. (mL/kg) 961.7 (7.3) 1991 (15.1) 163.3 (1.2)    IV Piggyback 405.6      Total Intake(mL/kg) 1367.3 (10.4) 2111 (16) 403.3 (3.1)    Urine (mL/kg/hr)  1000 (0.3)     Total Output  1000     Net +1367.3 +1111 +403.3                   Physical Exam  Constitutional:       Appearance: Normal appearance. She is obese.   HENT:      Mouth/Throat:      Mouth: Mucous membranes are moist.      Pharynx: Oropharynx is clear. "   Cardiovascular:      Rate and Rhythm: Normal rate.   Pulmonary:      Effort: Pulmonary effort is normal.   Chest:      Comments: Left breast erythema and tenderness improving. Wound vac intact.   Abdominal:      General: Abdomen is flat.      Palpations: Abdomen is soft.   Musculoskeletal:         General: Normal range of motion.   Skin:     General: Skin is warm and dry.   Neurological:      General: No focal deficit present.      Mental Status: She is alert.           Lab Results: I have reviewed the following results:  Recent Labs     10/06/24  1645 10/07/24  0441 10/08/24  0438   WBC 7.99   < > 14.24*   HGB 13.0   < > 13.1   HCT 39.4   < > 39.0      < > 215   SODIUM 138  --  136   K 4.0  --  4.3     --  107   CO2 26  --  23   BUN 8  --  10   CREATININE 0.65  --  0.62   GLUC 108  --  156*   AST 32  --   --    ALT 63*  --   --    ALB 4.0  --   --    TBILI 0.37  --   --    ALKPHOS 58  --   --     < > = values in this interval not displayed.           VTE Pharmacologic Prophylaxis: Heparin  VTE Mechanical Prophylaxis: sequential compression device    Sharon Penaloza PA-C

## 2024-10-08 NOTE — PLAN OF CARE
Problem: PAIN - ADULT  Goal: Verbalizes/displays adequate comfort level or baseline comfort level  Description: Interventions:  - Encourage patient to monitor pain and request assistance  - Assess pain using appropriate pain scale  - Administer analgesics based on type and severity of pain and evaluate response  - Implement non-pharmacological measures as appropriate and evaluate response  - Consider cultural and social influences on pain and pain management  - Notify physician/advanced practitioner if interventions unsuccessful or patient reports new pain  Outcome: Progressing     Problem: INFECTION - ADULT  Goal: Absence or prevention of progression during hospitalization  Description: INTERVENTIONS:  - Assess and monitor for signs and symptoms of infection  - Monitor lab/diagnostic results  - Monitor all insertion sites, i.e. indwelling lines, tubes, and drains  - Monitor endotracheal if appropriate and nasal secretions for changes in amount and color  - [place] appropriate cooling/warming therapies per order  - Administer medications as ordered  - Instruct and encourage patient and family to use good hand hygiene technique  - Identify and instruct in appropriate isolation precautions for identified infection/condition  Outcome: Progressing  Goal: Absence of fever/infection during neutropenic period  Description: INTERVENTIONS:  - Monitor WBC    Outcome: Progressing     Problem: SAFETY ADULT  Goal: Patient will remain free of falls  Description: INTERVENTIONS:  - Educate patient/family on patient safety including physical limitations  - Instruct patient to call for assistance with activity   - Consult OT/PT to assist with strengthening/mobility   - Keep Call bell within reach  - Keep bed low and locked with side rails adjusted as appropriate  - Keep care items and personal belongings within reach  - Initiate and maintain comfort rounds  - Make Fall Risk Sign visible to staff  - Apply yellow socks and bracelet  for high fall risk patients  - Consider moving patient to room near nurses station  Outcome: Progressing  Goal: Maintain or return to baseline ADL function  Description: INTERVENTIONS:  -  Assess patient's ability to carry out ADLs; assess patient's baseline for ADL function and identify physical deficits which impact ability to perform ADLs (bathing, care of mouth/teeth, toileting, grooming, dressing, etc.)  - Assess/evaluate cause of self-care deficits   - Assess range of motion  - Assess patient's mobility; develop plan if impaired  - Assess patient's need for assistive devices and provide as appropriate  - Encourage maximum independence but intervene and supervise when necessary  - Involve family in performance of ADLs  - Assess for home care needs following discharge   - Consider OT consult to assist with ADL evaluation and planning for discharge  - Provide patient education as appropriate  Outcome: Progressing  Goal: Maintains/Returns to pre admission functional level  Description: INTERVENTIONS:  - Perform AM-PAC 6 Click Basic Mobility/ Daily Activity assessment daily.  - Set and communicate daily mobility goal to care team and patient/family/caregiver.   - Collaborate with rehabilitation services on mobility goals if consulted  - Perform Range of Motion 3 times a day.  - Reposition patient every 2 hours.  - Dangle patient 3 times a day  - Stand patient 3 times a day  - Ambulate patient 3 times a day  - Out of bed to chair 3 times a day   - Out of bed for meals 3 times a day  - Out of bed for toileting  - Record patient progress and toleration of activity level   Outcome: Progressing     Problem: DISCHARGE PLANNING  Goal: Discharge to home or other facility with appropriate resources  Description: INTERVENTIONS:  - Identify barriers to discharge w/patient and caregiver  - Arrange for needed discharge resources and transportation as appropriate  - Identify discharge learning needs (meds, wound care, etc.)  -  Arrange for interpretive services to assist at discharge as needed  - Refer to Case Management Department for coordinating discharge planning if the patient needs post-hospital services based on physician/advanced practitioner order or complex needs related to functional status, cognitive ability, or social support system  Outcome: Progressing     Problem: Knowledge Deficit  Goal: Patient/family/caregiver demonstrates understanding of disease process, treatment plan, medications, and discharge instructions  Description: Complete learning assessment and assess knowledge base.  Interventions:  - Provide teaching at level of understanding  - Provide teaching via preferred learning methods  Outcome: Progressing

## 2024-10-08 NOTE — PROGRESS NOTES
Roxann Johnson is a 29 y.o. female who is currently ordered Vancomycin IV with management by the Pharmacy Consult service.  Relevant clinical data and objective / subjective history reviewed.  Vancomycin Assessment:  Indication and Goal AUC/Trough: Soft tissue (goal -600, trough >10)  Clinical Status: stable  Micro:     Renal Function:  SCr: 0.62 mg/dL  CrCl: 181.6 mL/min  Renal replacement: Not on dialysis  Days of Therapy: 3  Current Dose: 1500 mg iv q 12 hrs  Vancomycin Plan:  New Dosin mg iv q 8 hrs  Estimated AUC: 447 mcg*hr/mL  Estimated Trough: 12.5 mcg/mL  Next Level: 10/15/24 @ 0600  Renal Function Monitoring: Daily BMP and UOP  Pharmacy will continue to follow closely for s/sx of nephrotoxicity, infusion reactions and appropriateness of therapy.  BMP and CBC will be ordered per protocol. We will continue to follow the patient’s culture results and clinical progress daily.    Vivian Hernandez, Pharmacist

## 2024-10-09 VITALS
HEIGHT: 65 IN | DIASTOLIC BLOOD PRESSURE: 98 MMHG | OXYGEN SATURATION: 93 % | BODY MASS INDEX: 48.63 KG/M2 | HEART RATE: 77 BPM | RESPIRATION RATE: 18 BRPM | TEMPERATURE: 97.9 F | SYSTOLIC BLOOD PRESSURE: 164 MMHG | WEIGHT: 291.89 LBS

## 2024-10-09 LAB
ANION GAP SERPL CALCULATED.3IONS-SCNC: 6 MMOL/L (ref 4–13)
BASOPHILS # BLD AUTO: 0.08 THOUSANDS/ΜL (ref 0–0.1)
BASOPHILS NFR BLD AUTO: 1 % (ref 0–1)
BUN SERPL-MCNC: 14 MG/DL (ref 5–25)
CALCIUM SERPL-MCNC: 8.4 MG/DL (ref 8.4–10.2)
CHLORIDE SERPL-SCNC: 108 MMOL/L (ref 96–108)
CO2 SERPL-SCNC: 25 MMOL/L (ref 21–32)
CREAT SERPL-MCNC: 0.73 MG/DL (ref 0.6–1.3)
EOSINOPHIL # BLD AUTO: 0.18 THOUSAND/ΜL (ref 0–0.61)
EOSINOPHIL NFR BLD AUTO: 1 % (ref 0–6)
ERYTHROCYTE [DISTWIDTH] IN BLOOD BY AUTOMATED COUNT: 13.6 % (ref 11.6–15.1)
GFR SERPL CREATININE-BSD FRML MDRD: 110 ML/MIN/1.73SQ M
GLUCOSE SERPL-MCNC: 116 MG/DL (ref 65–140)
HCT VFR BLD AUTO: 38.3 % (ref 34.8–46.1)
HGB BLD-MCNC: 12.5 G/DL (ref 11.5–15.4)
IMM GRANULOCYTES # BLD AUTO: 0.19 THOUSAND/UL (ref 0–0.2)
IMM GRANULOCYTES NFR BLD AUTO: 2 % (ref 0–2)
LYMPHOCYTES # BLD AUTO: 3.54 THOUSANDS/ΜL (ref 0.6–4.47)
LYMPHOCYTES NFR BLD AUTO: 28 % (ref 14–44)
MCH RBC QN AUTO: 29.9 PG (ref 26.8–34.3)
MCHC RBC AUTO-ENTMCNC: 32.6 G/DL (ref 31.4–37.4)
MCV RBC AUTO: 92 FL (ref 82–98)
MONOCYTES # BLD AUTO: 0.5 THOUSAND/ΜL (ref 0.17–1.22)
MONOCYTES NFR BLD AUTO: 4 % (ref 4–12)
NEUTROPHILS # BLD AUTO: 7.97 THOUSANDS/ΜL (ref 1.85–7.62)
NEUTS SEG NFR BLD AUTO: 64 % (ref 43–75)
NRBC BLD AUTO-RTO: 0 /100 WBCS
PLATELET # BLD AUTO: 203 THOUSANDS/UL (ref 149–390)
PMV BLD AUTO: 10.7 FL (ref 8.9–12.7)
POTASSIUM SERPL-SCNC: 3.8 MMOL/L (ref 3.5–5.3)
RBC # BLD AUTO: 4.18 MILLION/UL (ref 3.81–5.12)
SODIUM SERPL-SCNC: 139 MMOL/L (ref 135–147)
WBC # BLD AUTO: 12.46 THOUSAND/UL (ref 4.31–10.16)

## 2024-10-09 PROCEDURE — 99024 POSTOP FOLLOW-UP VISIT: CPT | Performed by: PHYSICIAN ASSISTANT

## 2024-10-09 PROCEDURE — 80048 BASIC METABOLIC PNL TOTAL CA: CPT | Performed by: SURGERY

## 2024-10-09 PROCEDURE — 85025 COMPLETE CBC W/AUTO DIFF WBC: CPT

## 2024-10-09 PROCEDURE — 2W04X6Z CHANGE PRESSURE DRESSING ON CHEST WALL: ICD-10-PCS | Performed by: SURGERY

## 2024-10-09 RX ORDER — OXYCODONE HYDROCHLORIDE 5 MG/1
5 TABLET ORAL EVERY 6 HOURS PRN
Qty: 20 TABLET | Refills: 0 | Status: SHIPPED | OUTPATIENT
Start: 2024-10-09 | End: 2024-10-19

## 2024-10-09 RX ORDER — HYDROMORPHONE HCL/PF 1 MG/ML
0.5 SYRINGE (ML) INJECTION ONCE
Status: COMPLETED | OUTPATIENT
Start: 2024-10-09 | End: 2024-10-09

## 2024-10-09 RX ORDER — SULFAMETHOXAZOLE/TRIMETHOPRIM 800-160 MG
1 TABLET ORAL EVERY 12 HOURS SCHEDULED
Qty: 10 TABLET | Refills: 0 | Status: SHIPPED | OUTPATIENT
Start: 2024-10-09 | End: 2024-10-11 | Stop reason: ALTCHOICE

## 2024-10-09 RX ADMIN — HEPARIN SODIUM 7500 UNITS: 5000 INJECTION, SOLUTION INTRAVENOUS; SUBCUTANEOUS at 05:24

## 2024-10-09 RX ADMIN — VANCOMYCIN HYDROCHLORIDE 1250 MG: 1 INJECTION, POWDER, LYOPHILIZED, FOR SOLUTION INTRAVENOUS at 09:18

## 2024-10-09 RX ADMIN — HYDROMORPHONE HYDROCHLORIDE 0.5 MG: 1 INJECTION, SOLUTION INTRAMUSCULAR; INTRAVENOUS; SUBCUTANEOUS at 08:19

## 2024-10-09 RX ADMIN — VANCOMYCIN HYDROCHLORIDE 1250 MG: 1 INJECTION, POWDER, LYOPHILIZED, FOR SOLUTION INTRAVENOUS at 00:25

## 2024-10-09 RX ADMIN — LOSARTAN POTASSIUM 50 MG: 50 TABLET, FILM COATED ORAL at 08:19

## 2024-10-09 NOTE — ASSESSMENT & PLAN NOTE
Left breast abscess.    VAC changed bedside, currently troubleshooting device problem with leak alert. Will set-up for d/c home today and follow-up VAC changes M/W/F in our office. D/c with Rx for Nicoderm, Bactrim x 5 days, Oxycodone for pain control.

## 2024-10-09 NOTE — PROGRESS NOTES
Roxann Johnson is a 29 y.o. female who is currently ordered Vancomycin IV with management by the Pharmacy Consult service.  Relevant clinical data and objective / subjective history reviewed.  Vancomycin Assessment:  Indication and Goal AUC/Trough: Soft tissue (goal -600, trough >10)  Clinical Status: stable  Micro:     Renal Function:  SCr: 0.73 mg/dL  CrCl: 154.2 mL/min  Renal replacement: Not on dialysis  Days of Therapy: 4  Current Dose: 1500 mg iv q 12 hrs  Vancomycin Plan:  New Dosin mg iv q 8 hrs  Estimated AUC: 515 mcg*hr/mL  Estimated Trough: 15.2 mcg/mL  Next Level: 10/15/24 @ 0600  Renal Function Monitoring: Daily BMP and UOP  Pharmacy will continue to follow closely for s/sx of nephrotoxicity, infusion reactions and appropriateness of therapy.  BMP and CBC will be ordered per protocol. We will continue to follow the patient’s culture results and clinical progress daily.    Vivian Hernandez, Pharmacist

## 2024-10-09 NOTE — PLAN OF CARE
Problem: PAIN - ADULT  Goal: Verbalizes/displays adequate comfort level or baseline comfort level  Description: Interventions:  - Encourage patient to monitor pain and request assistance  - Assess pain using appropriate pain scale  - Administer analgesics based on type and severity of pain and evaluate response  - Implement non-pharmacological measures as appropriate and evaluate response  - Consider cultural and social influences on pain and pain management  - Notify physician/advanced practitioner if interventions unsuccessful or patient reports new pain  Outcome: Progressing     Problem: INFECTION - ADULT  Goal: Absence or prevention of progression during hospitalization  Description: INTERVENTIONS:  - Assess and monitor for signs and symptoms of infection  - Monitor lab/diagnostic results  - Monitor all insertion sites, i.e. indwelling lines, tubes, and drains  - Monitor endotracheal if appropriate and nasal secretions for changes in amount and color  - [place] appropriate cooling/warming therapies per order  - Administer medications as ordered  - Instruct and encourage patient and family to use good hand hygiene technique  - Identify and instruct in appropriate isolation precautions for identified infection/condition  Outcome: Progressing

## 2024-10-09 NOTE — DISCHARGE SUMMARY
Discharge Summary - Surgery-General   Name: Roxann Johnson 29 y.o. female I MRN: [id number]  Unit/Bed#: -01 I Date of Admission: 10/6/2024   Date of Service: 10/9/2024 I Hospital Day: 2    Admission Date: 10/6/2024 1616  Discharge Date: 10/09/24  Admitting Diagnosis: Breast pain [N64.4]  Breast abscess [N61.1]  Discharge Diagnosis:   Medical Problems       Resolved Problems  Date Reviewed: 10/7/2024   None         HPI: 30 yo F presenting with left breast abscess    Procedures Performed: No orders of the defined types were placed in this encounter.      Summary of Hospital Course:  underwent I&D and VAC application in the OR on 10/7/24 with Dr. Wilkins. Convalesced for 2 nights. VAC changed on 10/9/24 and found to be stable for d/c home and outpatient follow-up    Significant Findings, Care, Treatment and Services Provided: left breast abscess    Complications: none    Condition at Discharge: stable       Discharge instructions/Information to patient and family:   See After Visit Summary (AVS) for information provided to patient and family.      Provisions for Follow-Up Care:  See after visit summary for information related to follow-up care and any pertinent home health orders.      PCP: Kailash Curran MD    Disposition: Home    Planned Readmission: No     Discharge Medications:  See after visit summary for reconciled discharge medications provided to patient and family.      Discharge Statement:  I have spent a total time of 15 minutes in caring for this patient on the day of the visit/encounter. .

## 2024-10-09 NOTE — DISCHARGE INSTR - AVS FIRST PAGE
For pain you may take ibuprofen 600 mg every 6 hours scheduled for 3 days then as needed.  You can also take acetaminophen 650 mg every 6 hours scheduled for 3 days then as needed.  For ongoing pain you can alternate ibuprofen and acetaminophen every 3 hours.  If pain not controlled with this regimen you can use Oxycodone as prescribe.  Ice packs may be helpful, 20 min on, 20 min off alternating and monitoring skin.

## 2024-10-09 NOTE — PLAN OF CARE
Problem: DISCHARGE PLANNING  Goal: Discharge to home or other facility with appropriate resources  Description: INTERVENTIONS:  - Identify barriers to discharge w/patient and caregiver  - Arrange for needed discharge resources and transportation as appropriate  - Identify discharge learning needs (meds, wound care, etc.)  - Arrange for interpretive services to assist at discharge as needed  - Refer to Case Management Department for coordinating discharge planning if the patient needs post-hospital services based on physician/advanced practitioner order or complex needs related to functional status, cognitive ability, or social support system  Outcome: Adequate for Discharge

## 2024-10-10 NOTE — UTILIZATION REVIEW
NOTIFICATION OF ADMISSION DISCHARGE   This is a Notification of Discharge from [facility]. Please be advised that this patient has been discharge from our facility. Below you will find the admission and discharge date and time including the patient’s disposition.   UTILIZATION REVIEW CONTACT:  [place]  Utilization   Network Utilization Review Department  Phone: [phone] x carefully listen to the prompts. All voicemails are confidential.  Email: [email or web address]     ADMISSION INFORMATION  PRESENTATION DATE: 10/6/2024  4:16 PM  OBERVATION ADMISSION DATE: 10/06/2024 1915  INPATIENT ADMISSION DATE: 10/7/24 11:23 AM   DISCHARGE DATE: 10/9/2024 11:50 AM   DISPOSITION:Home/Self Care    Network Utilization Review Department  ATTENTION: Please call with any questions or concerns to [phone] and carefully listen to the prompts so that you are directed to the right person. All voicemails are confidential.   For Discharge needs, contact Care Management DC Support Team at [phone] opt. 2  Send all requests for admission clinical reviews, approved or denied determinations and any other requests to dedicated fax number below belonging to the campus where the patient is receiving treatment. List of dedicated fax numbers for the Facilities:  FACILITY NAME UR FAX NUMBER   ADMISSION DENIALS (Administrative/Medical Necessity) [phone]   DISCHARGE SUPPORT TEAM ([facility]) [phone]   PARENT CHILD HEALTH (Maternity/NICU/Pediatrics) [phone]   [facility] [phone]   [facility] [phone]   Critical access hospital [phone]   [facility] [phone]   [facility] [phone]   [facility] [phone]   [facility] [phone]   [facility]  [phone]   [facility] [phone]   [facility] [phone]   [facility] [phone]   [facility] [phone]

## 2024-10-11 ENCOUNTER — OFFICE VISIT (OUTPATIENT)
Dept: SURGERY | Facility: CLINIC | Age: 29
End: 2024-10-11

## 2024-10-11 VITALS
DIASTOLIC BLOOD PRESSURE: 88 MMHG | WEIGHT: 293 LBS | HEART RATE: 74 BPM | SYSTOLIC BLOOD PRESSURE: 138 MMHG | OXYGEN SATURATION: 98 % | RESPIRATION RATE: 18 BRPM | TEMPERATURE: 97 F | HEIGHT: 65 IN | BODY MASS INDEX: 48.82 KG/M2

## 2024-10-11 DIAGNOSIS — N61.1 LEFT BREAST ABSCESS: Primary | ICD-10-CM

## 2024-10-11 LAB
BACTERIA TISS AEROBE CULT: ABNORMAL
GRAM STN SPEC: ABNORMAL
GRAM STN SPEC: ABNORMAL

## 2024-10-11 PROCEDURE — 99024 POSTOP FOLLOW-UP VISIT: CPT | Performed by: PHYSICIAN ASSISTANT

## 2024-10-11 NOTE — PROGRESS NOTES
Ambulatory Visit  Name: Roxann Johnson      : 1995      MRN: [id number]  Encounter Provider: Kip Campbell PA-C  Encounter Date: 10/11/2024   Encounter department: [facility] GENERAL SURGERY [place]    Assessment & Plan  Left breast abscess  Wound clean, open, granulating and measures 4 x 2.5 x 1.5 cm. Infection has cleared. Based on culture data will switch Abx to Augmentin x 3 more days. Returning M/W/F next week for VAC changes then will transition to wet to dry.    Orders:    amoxicillin-clavulanate (AUGMENTIN) 875-125 mg per tablet; Take 1 tablet by mouth every 12 (twelve) hours for 3 days      History of Present Illness     Roxann Johnson is a 29 y.o. female who presents     History obtained from : patient  Review of Systems   All other systems reviewed and are negative.    Past Medical History   Past Medical History:   Diagnosis Date    Hypertension     Morbid obesity with BMI of 40.0-44.9, adult (HCC)     Sleep apnea     possible undiagnosed RUBINA-snores frequently     Past Surgical History:   Procedure Laterality Date    BREAST CYST INCISION AND DRAINAGE Left 10/7/2024    Procedure: INCISION AND DRAINAGE (I&D) BREAST;  Surgeon: Leroy Wilkins MD;  Location: [facility] MAIN [facility];  Service: General    NO PAST SURGERIES       Family History   Problem Relation Age of Onset    Hypertension Mother      Current Outpatient Medications on File Prior to Visit   Medication Sig Dispense Refill    losartan (COZAAR) 50 mg tablet Take 50 mg by mouth daily      Multiple Vitamin (MULTIVITAMIN) tablet Take 1 tablet by mouth daily      oxyCODONE (Roxicodone) 5 immediate release tablet Take 1 tablet (5 mg total) by mouth every 6 (six) hours as needed for severe pain for up to 10 days Max Daily Amount: 20 mg 20 tablet 0    [DISCONTINUED] sulfamethoxazole-trimethoprim (BACTRIM DS) 800-160 mg per tablet Take 1 tablet by mouth every 12 (twelve) hours for 5 days 10 tablet 0    diclofenac sodium (VOLTAREN) 50 mg EC  "tablet Take 1 tablet (50 mg total) by mouth 2 (two) times a day for 7 days With food 14 tablet 0    methocarbamol (ROBAXIN) 500 mg tablet Take 1 tablet (500 mg total) by mouth 4 (four) times a day for 12 doses 12 tablet 0    nicotine (NICODERM CQ) 7 mg/24hr TD 24 hr patch Place 1 patch on the skin over 24 hours every 24 hours (Patient not taking: Reported on 10/11/2024) 28 patch 2     No current facility-administered medications on file prior to visit.   No Known Allergies       Objective     /88 (BP Location: Left arm, Patient Position: Sitting, Cuff Size: Large)   Pulse 74   Temp (!) 97 °F (36.1 °C) (Temporal)   Resp 18   Ht 5' 5\" (1.651 m)   Wt 134 kg (296 lb)   LMP 09/27/2024 (Exact Date)   SpO2 98%   BMI 49.26 kg/m²     Physical Exam  Vitals and nursing note reviewed.   Constitutional:       General: She is not in acute distress.     Appearance: She is well-developed. She is not diaphoretic.   HENT:      Head: Normocephalic and atraumatic.   Eyes:      Extraocular Movements: EOM normal.      Conjunctiva/sclera: Conjunctivae normal.      Pupils: Pupils are equal, round, and reactive to light.   Pulmonary:      Effort: No respiratory distress.   Musculoskeletal:         General: Normal range of motion.      Cervical back: Normal range of motion.   Skin:     General: Skin is warm and dry.      Capillary Refill: Capillary refill takes less than 2 seconds.      Comments: Clean open moist and granulating. No signs of infection.   Neurological:      Mental Status: She is alert and oriented to person, place, and time.   Psychiatric:         Mood and Affect: Mood and affect normal.         Behavior: Behavior normal.         "

## 2024-10-11 NOTE — PROGRESS NOTES
Ambulatory Visit  Name: Roxann Johnson      : 1995      MRN: [id number]  Encounter Provider: Kip Campbell PA-C  Encounter Date: 10/14/2024   Encounter department: [facility] GENERAL SURGERY [place]    Assessment & Plan  Left breast abscess  Wound clean, open, granulating. No purulence or cellulitis. Small amount milky white nipple discharge noted. Measures 4 x 2 x 1 cm. VAC re-applied. Will reassess on Wednesday and likely transition to wet to dry dressings.           History of Present Illness     Roxann Johnson is a 29 y.o. female who presents s/p I&D left breast abscess and wound vac change. Pt states her left nipple has white discharge. Pt states the breast is itchy, hard and she occ suffers from stabbing pains. Pt also reports dizziness and weakness in the knees. Amilia.O,MA    History obtained from : patient  Review of Systems   All other systems reviewed and are negative.    Past Medical History   Past Medical History:   Diagnosis Date    Hypertension     Morbid obesity with BMI of 40.0-44.9, adult (HCC)     Sleep apnea     possible undiagnosed RUBINA-snores frequently     Past Surgical History:   Procedure Laterality Date    BREAST CYST INCISION AND DRAINAGE Left 10/7/2024    Procedure: INCISION AND DRAINAGE (I&D) BREAST;  Surgeon: Leroy Wilkins MD;  Location: [facility];  Service: General    NO PAST SURGERIES       Family History   Problem Relation Age of Onset    Hypertension Mother      Current Outpatient Medications on File Prior to Visit   Medication Sig Dispense Refill    amoxicillin-clavulanate (AUGMENTIN) 875-125 mg per tablet Take 1 tablet by mouth every 12 (twelve) hours for 3 days 6 tablet 0    diclofenac sodium (VOLTAREN) 50 mg EC tablet Take 1 tablet (50 mg total) by mouth 2 (two) times a day for 7 days With food 14 tablet 0    losartan (COZAAR) 50 mg tablet Take 50 mg by mouth daily      methocarbamol (ROBAXIN) 500 mg tablet Take 1 tablet (500 mg total) by mouth 4 (four)  times a day for 12 doses 12 tablet 0    Multiple Vitamin (MULTIVITAMIN) tablet Take 1 tablet by mouth daily      nicotine (NICODERM CQ) 7 mg/24hr TD 24 hr patch Place 1 patch on the skin over 24 hours every 24 hours (Patient not taking: Reported on 10/11/2024) 28 patch 2    oxyCODONE (Roxicodone) 5 immediate release tablet Take 1 tablet (5 mg total) by mouth every 6 (six) hours as needed for severe pain for up to 10 days Max Daily Amount: 20 mg 20 tablet 0     No current facility-administered medications on file prior to visit.   No Known Allergies       Objective     /86 (BP Location: Left arm, Patient Position: Sitting, Cuff Size: Large)   Pulse 73   Temp (!) 97.3 °F (36.3 °C) (Temporal)   LMP 09/27/2024 (Exact Date)   SpO2 98%     Physical Exam  Vitals and nursing note reviewed.   Constitutional:       General: She is not in acute distress.     Appearance: She is well-developed. She is not diaphoretic.   HENT:      Head: Normocephalic and atraumatic.   Eyes:      Extraocular Movements: EOM normal.      Conjunctiva/sclera: Conjunctivae normal.      Pupils: Pupils are equal, round, and reactive to light.   Pulmonary:      Effort: No respiratory distress.   Musculoskeletal:         General: Normal range of motion.      Cervical back: Normal range of motion.   Skin:     General: Skin is warm and dry.      Capillary Refill: Capillary refill takes less than 2 seconds.      Comments: Open wound to left breast, medial aspect nipple/areola. Clean open moist and granulating. No pus or cellulitis. Moderate induration of underlying tissue. Small amount milky white nipple discharge.   Neurological:      Mental Status: She is alert and oriented to person, place, and time.   Psychiatric:         Mood and Affect: Mood and affect normal.         Behavior: Behavior normal.

## 2024-10-11 NOTE — ASSESSMENT & PLAN NOTE
Wound clean, open, granulating and measures 4 x 2.5 x 1.5 cm. Infection has cleared. Based on culture data will switch Abx to Augmentin x 3 more days. Returning M/W/F next week for VAC changes then will transition to wet to dry.    Orders:    amoxicillin-clavulanate (AUGMENTIN) 875-125 mg per tablet; Take 1 tablet by mouth every 12 (twelve) hours for 3 days

## 2024-10-13 LAB — BACTERIA SPEC ANAEROBE CULT: NORMAL

## 2024-10-14 ENCOUNTER — OFFICE VISIT (OUTPATIENT)
Dept: SURGERY | Facility: CLINIC | Age: 29
End: 2024-10-14

## 2024-10-14 VITALS
TEMPERATURE: 97.3 F | SYSTOLIC BLOOD PRESSURE: 142 MMHG | OXYGEN SATURATION: 98 % | HEART RATE: 73 BPM | DIASTOLIC BLOOD PRESSURE: 86 MMHG

## 2024-10-14 DIAGNOSIS — N61.1 LEFT BREAST ABSCESS: Primary | ICD-10-CM

## 2024-10-14 PROCEDURE — 99024 POSTOP FOLLOW-UP VISIT: CPT | Performed by: PHYSICIAN ASSISTANT

## 2024-10-14 NOTE — ASSESSMENT & PLAN NOTE
Wound clean, open, granulating. No purulence or cellulitis. Small amount milky white nipple discharge noted. Measures 4 x 2 x 1 cm. VAC re-applied. Will reassess on Wednesday and likely transition to wet to dry dressings.

## 2024-10-15 NOTE — PROGRESS NOTES
Ambulatory Visit  Name: Roxann Johnson      : 1995      MRN: [id number]  Encounter Provider: Kip Campbell PA-C  Encounter Date: 10/16/2024   Encounter department: [facility] GENERAL SURGERY [place]    Assessment & Plan  Left breast abscess  Wound clean, open, moist. No signs of infection. Will transition to wet to dry dressings and recheck in 1 week. Finishing antibiotics today. Signs of recurrent infection reviewed. Once healed, will need subsequent left breast imaging with US.           History of Present Illness     Roxann Johnson is a 29 y.o. female who presents for s/p I&D left breast abscess and wound vac change. Pt denies pain or fevers/chills. Today is her last day on the abx. Pt is possibly developing a yeast infections from the abx and wants to know if the provider can prescribe something for it. BERNADETTE Zhao     History obtained from : patient  Review of Systems   All other systems reviewed and are negative.    Past Medical History   Past Medical History:   Diagnosis Date    Hypertension     Morbid obesity with BMI of 40.0-44.9, adult (HCC)     Sleep apnea     possible undiagnosed RUBINA-snores frequently     Past Surgical History:   Procedure Laterality Date    BREAST CYST INCISION AND DRAINAGE Left 10/7/2024    Procedure: INCISION AND DRAINAGE (I&D) BREAST;  Surgeon: Leroy Wilkins MD;  Location: [place] [facility];  Service: General    NO PAST SURGERIES       Family History   Problem Relation Age of Onset    Hypertension Mother      Current Outpatient Medications on File Prior to Visit   Medication Sig Dispense Refill    losartan (COZAAR) 50 mg tablet Take 50 mg by mouth daily      Multiple Vitamin (MULTIVITAMIN) tablet Take 1 tablet by mouth daily      diclofenac sodium (VOLTAREN) 50 mg EC tablet Take 1 tablet (50 mg total) by mouth 2 (two) times a day for 7 days With food 14 tablet 0    methocarbamol (ROBAXIN) 500 mg tablet Take 1 tablet (500 mg total) by mouth 4 (four) times a day for  12 doses 12 tablet 0    nicotine (NICODERM CQ) 7 mg/24hr TD 24 hr patch Place 1 patch on the skin over 24 hours every 24 hours (Patient not taking: Reported on 10/11/2024) 28 patch 2    oxyCODONE (Roxicodone) 5 immediate release tablet Take 1 tablet (5 mg total) by mouth every 6 (six) hours as needed for severe pain for up to 10 days Max Daily Amount: 20 mg (Patient not taking: Reported on 10/16/2024) 20 tablet 0     No current facility-administered medications on file prior to visit.   No Known Allergies       Objective     Temp (!) 97.2 °F (36.2 °C) (Temporal)   LMP 09/27/2024 (Exact Date)     Physical Exam  Vitals and nursing note reviewed.   Constitutional:       General: She is not in acute distress.     Appearance: She is well-developed. She is not diaphoretic.   HENT:      Head: Normocephalic and atraumatic.   Eyes:      Extraocular Movements: EOM normal.      Conjunctiva/sclera: Conjunctivae normal.      Pupils: Pupils are equal, round, and reactive to light.   Pulmonary:      Effort: No respiratory distress.   Musculoskeletal:         General: Normal range of motion.      Cervical back: Normal range of motion.   Skin:     General: Skin is warm and dry.      Capillary Refill: Capillary refill takes less than 2 seconds.      Comments: Open wound to left breast clean with granulation tissue. Less than 5 mm deep. No signs of infection.   Neurological:      Mental Status: She is alert and oriented to person, place, and time.   Psychiatric:         Mood and Affect: Mood and affect normal.         Behavior: Behavior normal.

## 2024-10-16 ENCOUNTER — OFFICE VISIT (OUTPATIENT)
Dept: SURGERY | Facility: CLINIC | Age: 29
End: 2024-10-16

## 2024-10-16 VITALS — TEMPERATURE: 97.2 F

## 2024-10-16 DIAGNOSIS — N61.1 LEFT BREAST ABSCESS: Primary | ICD-10-CM

## 2024-10-16 PROCEDURE — 99024 POSTOP FOLLOW-UP VISIT: CPT | Performed by: PHYSICIAN ASSISTANT

## 2024-10-16 NOTE — ASSESSMENT & PLAN NOTE
Wound clean, open, moist. No signs of infection. Will transition to wet to dry dressings and recheck in 1 week. Finishing antibiotics today. Signs of recurrent infection reviewed. Once healed, will need subsequent left breast imaging with US.

## 2024-10-16 NOTE — PATIENT INSTRUCTIONS
Clean with antibacterial soap/water then pat dry.  Moisten a sterile 4x4 gauze with sterile saline and place onto wound.  Cover with dry 4x4, ABD and secure with tape or with bra.  Change once or twice daily x 1 week.

## 2024-10-18 NOTE — PROGRESS NOTES
Ambulatory Visit  Name: Roxann Johnson      : 1995      MRN: [id number]  Encounter Provider: Kip Campbell PA-C  Encounter Date: 10/21/2024   Encounter department: [facility] SURGERY [place]    Assessment & Plan  Left breast abscess  Wound clean open moist and granulating. No signs of infection. Continue wet to dry dressings x 1 week then return for recheck.           History of Present Illness     Roxann Johnson is a 29 y.o. female who presents s/p I&D left breast abscess and dressing change. Wound vac removed on 10/16/24. Pt states theres mild soreness, but nothing severe. Pt denies foul smell or fevers/chills. BERNADETTE Zhao     History obtained from : patient  Review of Systems   All other systems reviewed and are negative.    Past Medical History   Past Medical History:   Diagnosis Date    Hypertension     Morbid obesity with BMI of 40.0-44.9, adult (HCC)     Sleep apnea     possible undiagnosed RUBINA-snores frequently     Past Surgical History:   Procedure Laterality Date    BREAST CYST INCISION AND DRAINAGE Left 10/7/2024    Procedure: INCISION AND DRAINAGE (I&D) BREAST;  Surgeon: Leroy Wilkins MD;  Location: [place] [facility];  Service: General    NO PAST SURGERIES       Family History   Problem Relation Age of Onset    Hypertension Mother      Current Outpatient Medications on File Prior to Visit   Medication Sig Dispense Refill    losartan (COZAAR) 50 mg tablet Take 50 mg by mouth daily      Multiple Vitamin (MULTIVITAMIN) tablet Take 1 tablet by mouth daily      diclofenac sodium (VOLTAREN) 50 mg EC tablet Take 1 tablet (50 mg total) by mouth 2 (two) times a day for 7 days With food 14 tablet 0    methocarbamol (ROBAXIN) 500 mg tablet Take 1 tablet (500 mg total) by mouth 4 (four) times a day for 12 doses 12 tablet 0    nicotine (NICODERM CQ) 7 mg/24hr TD 24 hr patch Place 1 patch on the skin over 24 hours every 24 hours (Patient not taking: Reported on 10/11/2024) 28 patch 2     No  current facility-administered medications on file prior to visit.   No Known Allergies       Objective     Temp (!) 97.2 °F (36.2 °C) (Temporal)   LMP 09/27/2024 (Exact Date)     Physical Exam  Vitals and nursing note reviewed.   Constitutional:       General: She is not in acute distress.     Appearance: She is well-developed. She is not diaphoretic.   HENT:      Head: Normocephalic and atraumatic.   Eyes:      Conjunctiva/sclera: Conjunctivae normal.      Pupils: Pupils are equal, round, and reactive to light.   Pulmonary:      Effort: No respiratory distress.   Musculoskeletal:         General: Normal range of motion.      Cervical back: Normal range of motion.   Skin:     General: Skin is warm and dry.      Capillary Refill: Capillary refill takes less than 2 seconds.      Comments: Left breast wound is clean, open, moist, granulating. No signs of infection, no active drainage.   Neurological:      Mental Status: She is alert and oriented to person, place, and time.   Psychiatric:         Behavior: Behavior normal.

## 2024-10-21 ENCOUNTER — OFFICE VISIT (OUTPATIENT)
Dept: SURGERY | Facility: CLINIC | Age: 29
End: 2024-10-21

## 2024-10-21 VITALS — TEMPERATURE: 97.2 F

## 2024-10-21 DIAGNOSIS — N61.1 LEFT BREAST ABSCESS: Primary | ICD-10-CM

## 2024-10-21 PROCEDURE — 99024 POSTOP FOLLOW-UP VISIT: CPT | Performed by: PHYSICIAN ASSISTANT

## 2024-10-21 NOTE — ASSESSMENT & PLAN NOTE
Wound clean open moist and granulating. No signs of infection. Continue wet to dry dressings x 1 week then return for recheck.

## 2024-10-21 NOTE — LETTER
October 21, 2024     Patient: Roxann Johnson  YOB: 1995  Date of Visit: 10/21/2024      To Whom it May Concern:    Roxann Johnson is under my professional care. Roxann was seen in my office on 10/21/2024. Roxann may return to work on 10/28/24 with no restrictions .    If you have any questions or concerns, please don't hesitate to call.         Sincerely,          Kip Campbell PA-C        CC: No Recipients

## 2024-10-25 NOTE — PROGRESS NOTES
Ambulatory Visit  Name: Roxann Johnson      : 1995      MRN: [id number]  Encounter Provider: Kip Campbell PA-C  Encounter Date: 10/28/2024   Encounter department: [facility] GENERAL SURGERY [place]    Assessment & Plan  Left breast abscess  Wound clean, open, moist, superficial. No active infection, tunneling, or slough. Advised daily soap scrub, neosporin, bandage. Should close within next couple weeks. Will see back for final wound check in 2 weeks and order repeat US of breast at that time. Questions answered, agreeable to the plan.         [employer]    Orders:    nystatin (MYCOSTATIN) powder; Apply topically 2 (two) times a day      History of Present Illness     Roxann Johnson is a 29 y.o. female who presents for a Wound check, s/p I&D left breast abscess and dressing change. Pt is doing well with the changes. Pt states she notice green drainage on the gauze but no foul smell. She has mild soreness but no fevers/chills. Pt developed a rash on left breast and had bila armpit foul smell.Amilia.O,MA    History obtained from : patient  Review of Systems   All other systems reviewed and are negative.    Past Medical History   Past Medical History:   Diagnosis Date    Hypertension     Morbid obesity with BMI of 40.0-44.9, adult (HCC)     Sleep apnea     possible undiagnosed RUBINA-snores frequently     Past Surgical History:   Procedure Laterality Date    BREAST CYST INCISION AND DRAINAGE Left 10/7/2024    Procedure: INCISION AND DRAINAGE (I&D) BREAST;  Surgeon: Leroy Wilkins MD;  Location: [facility];  Service: General    NO PAST SURGERIES       Family History   Problem Relation Age of Onset    Hypertension Mother      Current Outpatient Medications on File Prior to Visit   Medication Sig Dispense Refill    fluconazole (DIFLUCAN) 200 mg tablet       losartan (COZAAR) 50 mg tablet Take 50 mg by mouth daily      Multiple Vitamin (MULTIVITAMIN) tablet Take 1 tablet by mouth daily      nicotine  "(NICODERM CQ) 7 mg/24hr TD 24 hr patch Place 1 patch on the skin over 24 hours every 24 hours 28 patch 2    diclofenac sodium (VOLTAREN) 50 mg EC tablet Take 1 tablet (50 mg total) by mouth 2 (two) times a day for 7 days With food 14 tablet 0    methocarbamol (ROBAXIN) 500 mg tablet Take 1 tablet (500 mg total) by mouth 4 (four) times a day for 12 doses 12 tablet 0     No current facility-administered medications on file prior to visit.   No Known Allergies       Objective     /90 (BP Location: Right arm, Patient Position: Sitting, Cuff Size: Large)   Pulse 80   Temp 98.8 °F (37.1 °C) (Temporal)   Resp 18   Ht 5' 5\" (1.651 m)   LMP 09/27/2024 (Exact Date)   SpO2 100%   BMI 49.26 kg/m²     Physical Exam  Vitals and nursing note reviewed.   Constitutional:       General: She is not in acute distress.     Appearance: She is well-developed. She is not diaphoretic.   HENT:      Head: Normocephalic and atraumatic.   Eyes:      Conjunctiva/sclera: Conjunctivae normal.      Pupils: Pupils are equal, round, and reactive to light.   Pulmonary:      Effort: No respiratory distress.   Musculoskeletal:         General: Normal range of motion.      Cervical back: Normal range of motion.   Skin:     General: Skin is warm and dry.      Capillary Refill: Capillary refill takes less than 2 seconds.      Comments: Left breast wound is clean, open, moist, granulating.   Neurological:      Mental Status: She is alert and oriented to person, place, and time.   Psychiatric:         Behavior: Behavior normal.         "

## 2024-10-28 ENCOUNTER — TELEPHONE (OUTPATIENT)
Age: 29
End: 2024-10-28

## 2024-10-28 ENCOUNTER — OFFICE VISIT (OUTPATIENT)
Dept: SURGERY | Facility: CLINIC | Age: 29
End: 2024-10-28

## 2024-10-28 VITALS
TEMPERATURE: 98.8 F | RESPIRATION RATE: 18 BRPM | DIASTOLIC BLOOD PRESSURE: 90 MMHG | HEART RATE: 80 BPM | BODY MASS INDEX: 49.26 KG/M2 | HEIGHT: 65 IN | OXYGEN SATURATION: 100 % | SYSTOLIC BLOOD PRESSURE: 144 MMHG

## 2024-10-28 DIAGNOSIS — L30.4 INTERTRIGO: ICD-10-CM

## 2024-10-28 DIAGNOSIS — N61.1 LEFT BREAST ABSCESS: Primary | ICD-10-CM

## 2024-10-28 PROCEDURE — 99024 POSTOP FOLLOW-UP VISIT: CPT | Performed by: PHYSICIAN ASSISTANT

## 2024-10-28 RX ORDER — NYSTATIN 100000 [USP'U]/G
POWDER TOPICAL 2 TIMES DAILY
Qty: 30 G | Refills: 2 | Status: SHIPPED | OUTPATIENT
Start: 2024-10-28

## 2024-10-28 RX ORDER — FLUCONAZOLE 200 MG/1
TABLET ORAL
COMMUNITY
Start: 2024-10-23

## 2024-10-28 NOTE — TELEPHONE ENCOUNTER
Patient returned call from [place] transferred patient to [facility] for further assistance.

## 2024-10-28 NOTE — ASSESSMENT & PLAN NOTE
Wound clean, open, moist, superficial. No active infection, tunneling, or slough. Advised daily soap scrub, neosporin, bandage. Should close within next couple weeks. Will see back for final wound check in 2 weeks and order repeat US of breast at that time. Questions answered, agreeable to the plan.

## 2024-11-07 ENCOUNTER — TELEPHONE (OUTPATIENT)
Dept: SURGERY | Facility: CLINIC | Age: 29
End: 2024-11-07

## 2024-11-07 DIAGNOSIS — N61.1 BREAST ABSCESS: Primary | ICD-10-CM

## 2024-11-07 NOTE — TELEPHONE ENCOUNTER
Patient returning call.  Spoke with office and advised her of providers message.  She will start the antibiotic as well as heat/ice and ibuprofen as directed.    She stated she will cancel her appointment for tomorrow and plan on being seen at her appointment on Monday.    Will return call with any additional questions.    CB#[phone]

## 2024-11-07 NOTE — TELEPHONE ENCOUNTER
Called pt and left message to give our office a call so that I can explain [facility] message.

## 2024-11-07 NOTE — TELEPHONE ENCOUNTER
Cristi via secure chat response:     I'll send in Rx for antibiotics, ask her to start doing warm compress or ice pack and scheduled ibuprofen to help with pain

## 2024-11-07 NOTE — TELEPHONE ENCOUNTER
Roxann called stating she believes she has another infection on her left breast. s/p I&D left breast abscess 10/07/24. Pt states the wound is healed but she feels lump forming and theres a constant soreness pain at her nipple. Theres redness above the inc line. Pt states this pain started last night and she denies injuring her breast or laying on it the wrong way. Pt denies drainage or fevers/chills. Pt states she rates the pain a 2/10 at this time and she isn't taking anything for the pain. I schedule her an appt for tomorrow with you and I asked her to send pictures through "[employer].     Msg sent to [facility] via secure chat.     Made appt for tomorrow for a wound check.

## 2024-11-08 NOTE — PROGRESS NOTES
Ambulatory Visit  Name: Roxann Johnson      : 1995      MRN: [id number]  Encounter Provider: Kip Campbell PA-C  Encounter Date: 2024   Encounter department: [facility] GENERAL SURGERY [place]    Assessment & Plan  Left breast abscess  Pleasant 29-year-old female with prior ultrasound of the left breast in May 2024 showing a debris-filled ectatic duct, this subsequently developed an infection and was drained through I&D 1 month ago.  Has finally achieved wound healing but over the last several days developed increasing pain and swelling.  On examination there is no overlying skin changes however there is fullness to the area and associated tenderness suggestive of possible underlying infection.  We discussed multiple options including attempted needle aspiration, referral to IR for ultrasound-guided needle aspiration, consultation with Dr. Wilkins in 2 days, ultrasound of the area for further evaluation, and continued antibiotics alone.  She elects for ultrasound now and continue antibiotics with close follow-up.  Additionally we will make referral to Dr. Munguia regarding potential need for breast surgery if this is a recurrent ectatic debris-filled duct.  Questions answered, agreeable to the plan.    Orders:    US breast left limited (diagnostic); Future    Breast abscess    Orders:    amoxicillin-clavulanate (AUGMENTIN) 875-125 mg per tablet; Take 1 tablet by mouth every 12 (twelve) hours for 7 days      History of Present Illness     Roxann Johnson is a 29 y.o. female who presents s/p I&D left breast abscess and dressing change (on abx, nipple pain). Pt states she still suffers from pain around the nipple and theres soreness. No nipple discharge, or fevers/chills. Pt is on day 5 on the the abx and has two days left. Pt denies any GI issues from the abx.     History obtained from : patient  Review of Systems   All other systems reviewed and are negative.    Past Medical History   Past  "Medical History:   Diagnosis Date    Hypertension     Morbid obesity with BMI of 40.0-44.9, adult (HCC)     Sleep apnea     possible undiagnosed RUBINA-snores frequently     Past Surgical History:   Procedure Laterality Date    BREAST CYST INCISION AND DRAINAGE Left 10/7/2024    Procedure: INCISION AND DRAINAGE (I&D) BREAST;  Surgeon: Leroy Wilkins MD;  [place]: [facility];  Service: General    NO PAST SURGERIES       Family History   Problem Relation Age of Onset    Hypertension Mother      Current Outpatient Medications on File Prior to Visit   Medication Sig Dispense Refill    fluconazole (DIFLUCAN) 200 mg tablet       losartan (COZAAR) 50 mg tablet Take 50 mg by mouth daily      Multiple Vitamin (MULTIVITAMIN) tablet Take 1 tablet by mouth daily      nicotine (NICODERM CQ) 7 mg/24hr TD 24 hr patch Place 1 patch on the skin over 24 hours every 24 hours 28 patch 2    nystatin (MYCOSTATIN) powder Apply topically 2 (two) times a day 30 g 2    [DISCONTINUED] amoxicillin-clavulanate (AUGMENTIN) 875-125 mg per tablet Take 1 tablet by mouth every 12 (twelve) hours for 7 days 14 tablet 0    diclofenac sodium (VOLTAREN) 50 mg EC tablet Take 1 tablet (50 mg total) by mouth 2 (two) times a day for 7 days With food 14 tablet 0    methocarbamol (ROBAXIN) 500 mg tablet Take 1 tablet (500 mg total) by mouth 4 (four) times a day for 12 doses 12 tablet 0     No current facility-administered medications on file prior to visit.   No Known Allergies       Objective     /96 (BP Location: Left arm, Patient Position: Sitting, Cuff Size: Large)   Pulse 105   Temp 98.5 °F (36.9 °C) (Temporal)   Resp 18   Ht 5' 5\" (1.651 m)   Wt 135 kg (297 lb)   SpO2 97%   BMI 49.42 kg/m²     Physical Exam  Vitals and nursing note reviewed.   Constitutional:       General: She is not in acute distress.     Appearance: She is well-developed. She is not diaphoretic.   HENT:      Head: Normocephalic and atraumatic.   Eyes:      " Conjunctiva/sclera: Conjunctivae normal.      Pupils: Pupils are equal, round, and reactive to light.   Pulmonary:      Effort: No respiratory distress.   Musculoskeletal:         General: Normal range of motion.      Cervical back: Normal range of motion.   Skin:     General: Skin is warm and dry.      Capillary Refill: Capillary refill takes less than 2 seconds.      Comments: Left breast without redness warmth or fluctuance.  The wound is closed to the 10 11 o'clock position.  With palpation there is fullness of the underlying breast tissue without obvious fluctuance, mild tenderness to touch.  No acute cellulitis or mastitis appreciated   Neurological:      Mental Status: She is alert and oriented to person, place, and time.   Psychiatric:         Behavior: Behavior normal.

## 2024-11-11 ENCOUNTER — OFFICE VISIT (OUTPATIENT)
Dept: SURGERY | Facility: CLINIC | Age: 29
End: 2024-11-11

## 2024-11-11 ENCOUNTER — TELEPHONE (OUTPATIENT)
Dept: SURGERY | Facility: CLINIC | Age: 29
End: 2024-11-11

## 2024-11-11 VITALS
BODY MASS INDEX: 48.82 KG/M2 | DIASTOLIC BLOOD PRESSURE: 96 MMHG | HEIGHT: 65 IN | OXYGEN SATURATION: 97 % | RESPIRATION RATE: 18 BRPM | HEART RATE: 105 BPM | SYSTOLIC BLOOD PRESSURE: 144 MMHG | WEIGHT: 293 LBS | TEMPERATURE: 98.5 F

## 2024-11-11 DIAGNOSIS — N61.1 LEFT BREAST ABSCESS: Primary | ICD-10-CM

## 2024-11-11 DIAGNOSIS — N61.1 BREAST ABSCESS: ICD-10-CM

## 2024-11-11 PROCEDURE — 99024 POSTOP FOLLOW-UP VISIT: CPT | Performed by: PHYSICIAN ASSISTANT

## 2024-11-11 NOTE — ASSESSMENT & PLAN NOTE
Pleasant 29-year-old female with prior ultrasound of the left breast in May 2024 showing a debris-filled ectatic duct, this subsequently developed an infection and was drained through I&D 1 month ago.  Has finally achieved wound healing but over the last several days developed increasing pain and swelling.  On examination there is no overlying skin changes however there is fullness to the area and associated tenderness suggestive of possible underlying infection.  We discussed multiple options including attempted needle aspiration, referral to IR for ultrasound-guided needle aspiration, consultation with Dr. Wilkins in 2 days, ultrasound of the area for further evaluation, and continued antibiotics alone.  She elects for ultrasound now and continue antibiotics with close follow-up.  Additionally we will make referral to Dr. Munguia regarding potential need for breast surgery if this is a recurrent ectatic debris-filled duct.  Questions answered, agreeable to the plan.    Orders:    US breast left limited (diagnostic); Future

## 2024-11-21 ENCOUNTER — TELEPHONE (OUTPATIENT)
Dept: SURGERY | Facility: CLINIC | Age: 29
End: 2024-11-21

## 2024-11-21 NOTE — TELEPHONE ENCOUNTER
Called pt and left message to give our office a call, its regarding her appt she has tomorrow 11/22/24.

## 2024-11-26 ENCOUNTER — RESULTS FOLLOW-UP (OUTPATIENT)
Dept: OTHER | Facility: HOSPITAL | Age: 29
End: 2024-11-26

## 2024-11-26 ENCOUNTER — HOSPITAL ENCOUNTER (OUTPATIENT)
Dept: ULTRASOUND IMAGING | Facility: CLINIC | Age: 29
Discharge: HOME | End: 2024-11-26
Payer: COMMERCIAL

## 2024-11-26 DIAGNOSIS — N61.1 LEFT BREAST ABSCESS: ICD-10-CM

## 2024-11-26 PROCEDURE — 76642 ULTRASOUND BREAST LIMITED: CPT

## 2024-11-27 ENCOUNTER — OFFICE VISIT (OUTPATIENT)
Dept: SURGERY | Facility: CLINIC | Age: 29
End: 2024-11-27
Payer: COMMERCIAL

## 2024-11-27 VITALS
OXYGEN SATURATION: 96 % | TEMPERATURE: 98 F | SYSTOLIC BLOOD PRESSURE: 158 MMHG | HEIGHT: 65 IN | DIASTOLIC BLOOD PRESSURE: 88 MMHG | HEART RATE: 79 BPM | WEIGHT: 293 LBS | BODY MASS INDEX: 48.82 KG/M2 | RESPIRATION RATE: 16 BRPM

## 2024-11-27 DIAGNOSIS — N61.1 LEFT BREAST ABSCESS: Primary | ICD-10-CM

## 2024-11-27 DIAGNOSIS — Z80.3 FAMILY HISTORY OF BREAST CANCER: ICD-10-CM

## 2024-11-27 DIAGNOSIS — N63.42 SUBAREOLAR LUMP OF LEFT BREAST: ICD-10-CM

## 2024-11-27 PROCEDURE — 99204 OFFICE O/P NEW MOD 45 MIN: CPT | Performed by: SURGERY

## 2024-11-27 NOTE — PROGRESS NOTES
Name: Roxann Johnson      : 1995      MRN: [id number]  Encounter Provider: Kip Campbell PA-C  Encounter Date: 2024   Encounter department: [facility] GENERAL SURGERY [place]  :  Assessment & Plan  Subareolar lump of left breast  Following I&D and wound healing has developed interval recurrent abscess that has since decompressed through a small sinus affecting the scar. On exam today the infection is resolved, no cellulitis or abscess present. Advised local wound care for the small skin sinus alone and routine follow-up in 3 months with Dr. Munguia.    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 Advised smoking cessation so that she can become a candidate for exploration if indicated. In the interim will treat recurrent infections as needed. Questions answered, follow-up arranged.             History of Present Illness     Reports spontaneous drainage and now scant bloody discharge. Pain resolving. No fever.    f  Roxann Johnson is a 29 y.o. female who is here in follow up  to an , s/p I & D 10/11/2024  History obtained from: patient    Review of Systems   All other systems reviewed and are negative.    Past Medical History   Past Medical History:   Diagnosis Date    Hypertension     Morbid obesity with BMI of 40.0-44.9, adult (HCC)     Sleep apnea     possible undiagnosed RUBINA-snores frequently     Past Surgical History:   Procedure Laterality Date    BREAST CYST ASPIRATION Left     I&D Left Breast    BREAST CYST INCISION AND DRAINAGE Left 10/07/2024    Procedure: INCISION AND DRAINAGE (I&D) BREAST;  Surgeon: Leroy Wilkins MD;  [place]: [place] [facility];  Service: General    NO PAST SURGERIES       Family History   Problem Relation Age of Onset    Hypertension Mother     Breast cancer Maternal Grandmother 50    Cancer Maternal Grandmother         Breast Cancer    Breast cancer Maternal Great-Grandparent       reports that she has quit  "smoking. Her smoking use included cigarettes. She started smoking about 5 years ago. She has never used smokeless tobacco. She reports current alcohol use. She reports that she does not use drugs.  Current Outpatient Medications on File Prior to Visit   Medication Sig Dispense Refill    diclofenac sodium (VOLTAREN) 50 mg EC tablet Take 1 tablet (50 mg total) by mouth 2 (two) times a day for 7 days With food (Patient not taking: Reported on 11/27/2024) 14 tablet 0    fluconazole (DIFLUCAN) 200 mg tablet  (Patient not taking: Reported on 11/27/2024)      losartan (COZAAR) 50 mg tablet Take 50 mg by mouth daily      methocarbamol (ROBAXIN) 500 mg tablet Take 1 tablet (500 mg total) by mouth 4 (four) times a day for 12 doses (Patient not taking: Reported on 11/27/2024) 12 tablet 0    Multiple Vitamin (MULTIVITAMIN) tablet Take 1 tablet by mouth daily      nicotine (NICODERM CQ) 7 mg/24hr TD 24 hr patch Place 1 patch on the skin over 24 hours every 24 hours (Patient not taking: Reported on 11/27/2024) 28 patch 2    nystatin (MYCOSTATIN) powder Apply topically 2 (two) times a day (Patient not taking: Reported on 11/27/2024) 30 g 2     No current facility-administered medications on file prior to visit.   No Known Allergies      Objective   /90 (BP Location: Right arm, Patient Position: Sitting, Cuff Size: Large)   Pulse 84   Temp 97.6 °F (36.4 °C) (Temporal)   Resp 16   Ht 5' 5\" (1.651 m)   LMP 11/18/2024 (Approximate)   SpO2 98%   BMI 49.29 kg/m²      Physical Exam  Vitals and nursing note reviewed.   Constitutional:       General: She is not in acute distress.     Appearance: She is well-developed. She is not diaphoretic.   HENT:      Head: Normocephalic and atraumatic.   Eyes:      Conjunctiva/sclera: Conjunctivae normal.      Pupils: Pupils are equal, round, and reactive to light.   Pulmonary:      Effort: No respiratory distress.   Chest:          Comments: Small sinus emerging from center of " circumareolar scar. No active drainage or purulence. No cellulitis or abscess.  Musculoskeletal:         General: Normal range of motion.      Cervical back: Normal range of motion.   Skin:     General: Skin is warm and dry.      Capillary Refill: Capillary refill takes less than 2 seconds.   Neurological:      Mental Status: She is alert and oriented to person, place, and time.   Psychiatric:         Behavior: Behavior normal.

## 2024-12-02 ENCOUNTER — OFFICE VISIT (OUTPATIENT)
Dept: SURGERY | Facility: CLINIC | Age: 29
End: 2024-12-02

## 2024-12-02 VITALS
OXYGEN SATURATION: 98 % | BODY MASS INDEX: 49.29 KG/M2 | HEART RATE: 84 BPM | TEMPERATURE: 97.6 F | DIASTOLIC BLOOD PRESSURE: 90 MMHG | RESPIRATION RATE: 16 BRPM | SYSTOLIC BLOOD PRESSURE: 168 MMHG | HEIGHT: 65 IN

## 2024-12-02 DIAGNOSIS — N63.42 SUBAREOLAR LUMP OF LEFT BREAST: Primary | ICD-10-CM

## 2024-12-02 PROBLEM — Z80.3 FAMILY HISTORY OF BREAST CANCER: Status: ACTIVE | Noted: 2024-12-02

## 2024-12-02 PROCEDURE — 99024 POSTOP FOLLOW-UP VISIT: CPT | Performed by: PHYSICIAN ASSISTANT

## 2024-12-02 NOTE — PROGRESS NOTES
"Consult - Surgical Oncology  Roxann Johnson 29 y.o. female MRN: [id number]  Encounter: [id number]    Assessment & Plan     29F with obesity, BMI 49, prior tobacco abuse, recently quit, longstanding recurrent left subareolar breast infections, recently requiring incision and drainage in the OR on 10/7/24 (Dr. Wilkins) for source control. She is now off of antibiotics, wound healed about 2 weeks ago. Updated US reviewed by me showing 2.5cm of fluid in the subareolar space, prior CT on 10/6 done before OR showed 5 x 3.5cm abscess.  On exam she has a well healed incision in the left medial periareolar region and a quarter sized area of induration that is nontender in the superomedial aspect of the areola. She mentions that she is used to this coming and going monthly and that the area of induration moves to different spots on the areola.    We discussed that her recurrent left breast infections and changes could be related to a condition called SMOLD, squamous metaplasia of lactiferous ducts. This is thought to be related to exposure to nicotine. We discussed that in someone who has stopped all nicotine products, we can consider an aggressive intervention to address this condition with a central lumpectomy involving removal of the nipple areola complex, although we usually consider this a last resort in patients who have continued sinus tracts to the skin and significant symptomatology despite optimum antibiotics and source control. At this point, I do not see a targeted excision option to allow preservation of the nipple and areola as the current induration is in a different place than her prior induration and the \"lumps\" and firmness moves throughout the subareolar region.    At this point, there is no pain, no signs of infection, the incision is well healed. I encouraged her to continue her smoking cessation efforts. We will arrange another US in 3 months with an appointment to follow. She will call sooner with " recurrent symptoms.      History of Present Illness   Chief Complaint   Patient presents with    Breast Complaint     2-3 years of recurrent left breast inflammation and infections. Symptoms come and go every month for 2 years. Was on antibiotics the first time in 2022, and this most recent time, but most of the time manages it on her own. Usually no pain, no fever, no breast redness, she will manage it with warm showers and waiting it out. More recently she was cared for by our surgical team for a symptomatic left breast cellulitis and infection requiring incision and drainage and antibiotics. She initially had VAC therapy, then packing, now healed. She has a well healing periareolar incision on the left medial breast. The site has been healed for 2 weeks. She has been off of antibiotics for about 1 week. She is overall feeling well. She showed me an area of induration on the medial areola, about a quarter sized, non tender, no fluctuance, no cellulitis, just phlegmonous change. She says this is what she is used to seeing monthly and it will come and go in different spots and usually not drain or become infected or tender. It is currently present and visible and palpable but not symptomatic. Updated US of the area from yesterday reviewed by me showing 2.5cm of fluid in the left retroareolar breast. Prior CT scan images reviewed by me from early 10/2024 prior to OR for I and D showing 5 x 3.5cm abscess. Patient was a prior smoker and has recently stopped during this episode. She has prior mammograms and US in the chart between 2022 and now suggesting some debris filled ducts in the left subareolar breast. This was the first time she required a procedure.    Menarche - 11  Pregnancies - 1 (miscarriage)  Age - 24  OCP - short use  Menopause - N/A  HRT - no  Personal - no breast biopsies, recent I and D left breast, no abnormal pap smears  Family - maternal grandmother with breast cancer in her 50's, maternal great  grandmother with breast cancer in her 70's        Review of Systems   Constitutional: Negative.    Skin:  Positive for wound.   Hematological: Negative.    All other systems reviewed and are negative.      Historical Information   Past Medical History:   Diagnosis Date    Hypertension     Morbid obesity with BMI of 40.0-44.9, adult (HCC)     Sleep apnea     possible undiagnosed RUBINA-snores frequently     Past Surgical History:   Procedure Laterality Date    BREAST CYST ASPIRATION Left     I&D Left Breast    BREAST CYST INCISION AND DRAINAGE Left 10/07/2024    Procedure: INCISION AND DRAINAGE (I&D) BREAST;  Surgeon: Leroy Wilkins MD;  Location: [facility];  Service: General    NO PAST SURGERIES       Social History   Social History     Substance and Sexual Activity   Alcohol Use Yes    Comment: socially     Social History     Substance and Sexual Activity   Drug Use Never     Social History     Tobacco Use   Smoking Status Former    Types: Cigarettes    Start date: 2019   Smokeless Tobacco Never   Tobacco Comments    Stopped smoking since 10/06/24     Family History   Problem Relation Age of Onset    Hypertension Mother     Breast cancer Maternal Grandmother 50    Cancer Maternal Grandmother         Breast Cancer    Breast cancer Maternal Great-Grandparent        Meds/Allergies     Current Outpatient Medications:     losartan (COZAAR) 50 mg tablet, Take 50 mg by mouth daily, Disp: , Rfl:     Multiple Vitamin (MULTIVITAMIN) tablet, Take 1 tablet by mouth daily, Disp: , Rfl:     diclofenac sodium (VOLTAREN) 50 mg EC tablet, Take 1 tablet (50 mg total) by mouth 2 (two) times a day for 7 days With food (Patient not taking: Reported on 11/27/2024), Disp: 14 tablet, Rfl: 0    fluconazole (DIFLUCAN) 200 mg tablet, , Disp: , Rfl:     methocarbamol (ROBAXIN) 500 mg tablet, Take 1 tablet (500 mg total) by mouth 4 (four) times a day for 12 doses (Patient not taking: Reported on 11/27/2024), Disp: 12 tablet, Rfl: 0     "nicotine (NICODERM CQ) 7 mg/24hr TD 24 hr patch, Place 1 patch on the skin over 24 hours every 24 hours (Patient not taking: Reported on 11/27/2024), Disp: 28 patch, Rfl: 2    nystatin (MYCOSTATIN) powder, Apply topically 2 (two) times a day (Patient not taking: Reported on 11/27/2024), Disp: 30 g, Rfl: 2  No Known Allergies    The following portions of the patient's history were reviewed and updated as appropriate: allergies, current medications, past family history, past medical history, past social history, past surgical history, and problem list.    Objective   Current Vitals:   Blood pressure 158/88, pulse 79, temperature 98 °F (36.7 °C), temperature source Temporal, resp. rate 16, height 5' 5\" (1.651 m), weight 134 kg (296 lb 3.2 oz), last menstrual period 11/18/2024, SpO2 96%, not currently breastfeeding.    Physical Exam  Vitals reviewed.   Constitutional:       General: She is not in acute distress.     Appearance: She is obese. She is not ill-appearing or toxic-appearing.   HENT:      Head: Normocephalic.      Nose: No congestion.      Mouth/Throat:      Mouth: Mucous membranes are moist.   Eyes:      Pupils: Pupils are equal, round, and reactive to light.   Cardiovascular:      Rate and Rhythm: Normal rate.   Pulmonary:      Effort: Pulmonary effort is normal. No respiratory distress.   Abdominal:      Palpations: Abdomen is soft.   Musculoskeletal:         General: Normal range of motion.      Cervical back: Normal range of motion and neck supple.   Lymphadenopathy:      Cervical: No cervical adenopathy.   Skin:     General: Skin is warm and dry.      Capillary Refill: Capillary refill takes less than 2 seconds.   Neurological:      General: No focal deficit present.      Mental Status: She is alert.   Psychiatric:         Mood and Affect: Mood normal.       The patient has no palpable cervical, supraclavicular, or axillary lymphadenopathy bilaterally.  The left breast has a periareolar medial incision " well healed. There is an area of nontender induration lateral to the incision just deep to the medial areola. No signs of cellulitis, no drainage.  There are otherwise no dominant lumps, masses, skin changes or nipple retraction bilaterally.     I have spent a total time of 40 minutes in caring for this patient on the day of the visit/encounter including Diagnostic results, Prognosis, Risks and benefits of tx options, Instructions for management, Patient and family education, Risk factor reductions, Impressions, Counseling / Coordination of care, Reviewing / ordering tests, medicine, procedures  , and Obtaining or reviewing history  .   Signature:  Felicitas Munguia MD  Date: 12/2/2024 Time: 10:07 AM

## 2024-12-02 NOTE — ASSESSMENT & PLAN NOTE
Following I&D and wound healing has developed interval recurrent abscess that has since decompressed through a small sinus affecting the scar. On exam today the infection is resolved, no cellulitis or abscess present. Advised local wound care for the small skin sinus alone and routine follow-up in 3 months with Dr. Munguia.    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 Advised smoking cessation so that she can become a candidate for exploration if indicated. In the interim will treat recurrent infections as needed. Questions answered, follow-up arranged.

## 2024-12-20 ENCOUNTER — TELEPHONE (OUTPATIENT)
Dept: SURGERY | Facility: CLINIC | Age: 29
End: 2024-12-20

## 2024-12-20 DIAGNOSIS — N61.1 LEFT BREAST ABSCESS: Primary | ICD-10-CM

## 2024-12-20 NOTE — TELEPHONE ENCOUNTER
Called and left a voicemail message with advice regarding antibiotics warm compress and symptoms to monitor for which should prompt her to go to the ER for further evaluation.  Also suggested she come into the office Monday for us to take a look at it.

## 2024-12-23 ENCOUNTER — TELEPHONE (OUTPATIENT)
Age: 29
End: 2024-12-23

## 2024-12-23 ENCOUNTER — OFFICE VISIT (OUTPATIENT)
Dept: SURGERY | Facility: CLINIC | Age: 29
End: 2024-12-23

## 2024-12-23 VITALS
SYSTOLIC BLOOD PRESSURE: 140 MMHG | WEIGHT: 282 LBS | TEMPERATURE: 97.3 F | BODY MASS INDEX: 46.98 KG/M2 | HEART RATE: 75 BPM | HEIGHT: 65 IN | DIASTOLIC BLOOD PRESSURE: 88 MMHG | OXYGEN SATURATION: 98 %

## 2024-12-23 DIAGNOSIS — N63.42 SUBAREOLAR LUMP OF LEFT BREAST: ICD-10-CM

## 2024-12-23 DIAGNOSIS — L30.4 INTERTRIGO: Primary | ICD-10-CM

## 2024-12-23 PROCEDURE — 99212 OFFICE O/P EST SF 10 MIN: CPT | Performed by: PHYSICIAN ASSISTANT

## 2024-12-23 RX ORDER — CLOTRIMAZOLE AND BETAMETHASONE DIPROPIONATE 10; .64 MG/G; MG/G
CREAM TOPICAL 2 TIMES DAILY
Qty: 15 G | Refills: 1 | Status: SHIPPED | OUTPATIENT
Start: 2024-12-23 | End: 2024-12-30

## 2024-12-23 NOTE — ASSESSMENT & PLAN NOTE
Now 2 and half months out from surgical I&D of a large left breast subareolar abscess.  Following wound healing has developed 2 separate recurrent wound infections that have drained through the scar.  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  Currently established with Dr. Munguia however with the recurrent infections I advised accelerated follow-up to discuss surgical options further.  In the interim the most recent infection has drained spontaneously and appears controlled with oral antibiotics and packing.  Will plan to see back as needed for assistance with wound care and defer additional treatment recommendations to Dr. Munguia.

## 2024-12-23 NOTE — TELEPHONE ENCOUNTER
Called pt and left message stating to have the hospital fax over the ED notes and imaging and if a auth release form needs to be sign if she can please do so.     Pt was seen in the ED over the weekend out of town for worsening symptoms of the breast. Pictures is included in the mychart messages.

## 2024-12-23 NOTE — PROGRESS NOTES
Name: Roxann Johnson      : 1995      MRN: [id number]  Encounter Provider: Kip Campbell PA-C  Encounter Date: 2024   Encounter department: [facility] GENERAL SURGERY [place]  :  Assessment & Plan  Intertrigo  Lotrisone cream applied today and prescription sent to pharmacy, discussed trial of nystatin powder first.  Orders:    clotrimazole-betamethasone (LOTRISONE) 1-0.05 % cream; Apply topically 2 (two) times a day for 7 days    Subareolar lump of left breast  Now 2 and half months out from surgical I&D of a large left breast subareolar abscess.  Following wound healing has developed 2 separate recurrent wound infections that have drained through the scar.  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  Currently established with Dr. Munguia however with the recurrent infections I advised accelerated follow-up to discuss surgical options further.  In the interim the most recent infection has drained spontaneously and appears controlled with oral antibiotics and packing.  Will plan to see back as needed for assistance with wound care and defer additional treatment recommendations to Dr. Munguia.             History of Present Illness   HPI  Roxann Johnson is a 29 y.o. female who presents for a follow up after ED visit over the weekend due to having build up fluid around the incision on left from her breast surgery s/p I&D of breast abscess 10/07/24. Pt was seen in [facility] ED department. A ultrasound was done, they packed the wound and sent her home. Pt states she did call the hospital and will get records faxed over. Pt states the wound still has yellow/ red drainage with foul smell and the drainage saturates throung the dressing  History obtained from: patient    Review of Systems   All other systems reviewed and are negative.    Past Medical History   Past Medical History:   Diagnosis Date    Hypertension     Morbid obesity with  BMI of 40.0-44.9, adult (HCC)     Sleep apnea     possible undiagnosed RUBINA-snores frequently     Past Surgical History:   Procedure Laterality Date    BREAST CYST ASPIRATION Left     I&D Left Breast    BREAST CYST INCISION AND DRAINAGE Left 10/07/2024    Procedure: INCISION AND DRAINAGE (I&D) BREAST;  Surgeon: Leroy Wilkins MD;  Location: [facility];  Service: General    NO PAST SURGERIES       Family History   Problem Relation Age of Onset    Hypertension Mother     Breast cancer Maternal Grandmother 50    Cancer Maternal Grandmother         Breast Cancer    Breast cancer Maternal Great-Grandparent       reports that she has quit smoking. Her smoking use included cigarettes. She started smoking about 5 years ago. She has never used smokeless tobacco. She reports current alcohol use. She reports that she does not use drugs.  Current Outpatient Medications on File Prior to Visit   Medication Sig Dispense Refill    amoxicillin-clavulanate (AUGMENTIN) 875-125 mg per tablet Take 1 tablet by mouth every 12 (twelve) hours for 10 days 20 tablet 0    losartan (COZAAR) 50 mg tablet Take 50 mg by mouth daily      Multiple Vitamin (MULTIVITAMIN) tablet Take 1 tablet by mouth daily      diclofenac sodium (VOLTAREN) 50 mg EC tablet Take 1 tablet (50 mg total) by mouth 2 (two) times a day for 7 days With food (Patient not taking: Reported on 11/27/2024) 14 tablet 0    fluconazole (DIFLUCAN) 200 mg tablet  (Patient not taking: Reported on 11/27/2024)      methocarbamol (ROBAXIN) 500 mg tablet Take 1 tablet (500 mg total) by mouth 4 (four) times a day for 12 doses (Patient not taking: Reported on 11/27/2024) 12 tablet 0    nicotine (NICODERM CQ) 7 mg/24hr TD 24 hr patch Place 1 patch on the skin over 24 hours every 24 hours (Patient not taking: Reported on 11/27/2024) 28 patch 2    nystatin (MYCOSTATIN) powder Apply topically 2 (two) times a day (Patient not taking: Reported on 11/27/2024) 30 g 2     No current  "facility-administered medications on file prior to visit.   No Known Allergies      Objective   /88 (BP Location: Right arm, Patient Position: Sitting, Cuff Size: Large)   Pulse 75   Temp (!) 97.3 °F (36.3 °C) (Temporal)   Ht 5' 5\" (1.651 m)   Wt 128 kg (282 lb)   LMP 11/18/2024 (Approximate)   SpO2 98%   BMI 46.93 kg/m²      Physical Exam  Vitals and nursing note reviewed.   Constitutional:       General: She is not in acute distress.     Appearance: She is well-developed. She is not diaphoretic.   HENT:      Head: Normocephalic and atraumatic.   Eyes:      Conjunctiva/sclera: Conjunctivae normal.      Pupils: Pupils are equal, round, and reactive to light.   Pulmonary:      Effort: No respiratory distress.   Musculoskeletal:         General: Normal range of motion.      Cervical back: Normal range of motion.   Skin:     General: Skin is warm and dry.      Capillary Refill: Capillary refill takes less than 2 seconds.      Comments: Open wound to the scar of the medial perianal areolar region of the left breast.  There is an open wound about 1.5 cm across when probed tunnels down to a deeper abscess cavity where there is some residual cloudy fluid.  No active cellulitis, mild to moderate residual induration within the breast tissue is tender to touch without signs of mastitis.   Neurological:      Mental Status: She is alert and oriented to person, place, and time.   Psychiatric:         Behavior: Behavior normal.         "

## 2024-12-24 DIAGNOSIS — N61.1 LEFT BREAST ABSCESS: ICD-10-CM

## 2024-12-24 DIAGNOSIS — Z72.0 TOBACCO ABUSE: Primary | ICD-10-CM

## 2024-12-24 DIAGNOSIS — Z80.3 FAMILY HISTORY OF BREAST CANCER: ICD-10-CM

## 2025-01-09 ENCOUNTER — OFFICE VISIT (OUTPATIENT)
Dept: SURGERY | Facility: CLINIC | Age: 30
End: 2025-01-09
Payer: COMMERCIAL

## 2025-01-09 VITALS
HEIGHT: 65 IN | HEART RATE: 83 BPM | BODY MASS INDEX: 47.32 KG/M2 | SYSTOLIC BLOOD PRESSURE: 136 MMHG | DIASTOLIC BLOOD PRESSURE: 84 MMHG | WEIGHT: 284 LBS | OXYGEN SATURATION: 98 % | TEMPERATURE: 97.5 F

## 2025-01-09 DIAGNOSIS — N64.52 DISCHARGE FROM LEFT NIPPLE: Primary | ICD-10-CM

## 2025-01-09 DIAGNOSIS — N61.0 BREAST INFECTION: ICD-10-CM

## 2025-01-09 DIAGNOSIS — N63.42 SUBAREOLAR LUMP OF LEFT BREAST: ICD-10-CM

## 2025-01-09 DIAGNOSIS — Z80.3 FAMILY HISTORY OF BREAST CANCER: ICD-10-CM

## 2025-01-09 PROCEDURE — 99214 OFFICE O/P EST MOD 30 MIN: CPT | Performed by: SURGERY

## 2025-01-13 PROBLEM — N64.52 DISCHARGE FROM LEFT NIPPLE: Status: ACTIVE | Noted: 2025-01-13

## 2025-01-13 PROBLEM — N61.0 BREAST INFECTION: Status: ACTIVE | Noted: 2025-01-13

## 2025-01-13 NOTE — PROGRESS NOTES
"Progress Note - Surgical Oncology  Roxann Johnson 29 y.o. female MRN: [id number]  Encounter: [id number]    Assessment & Plan     29F with obesity, BMI 47, prior tobacco abuse, recently quit, longstanding recurrent left subareolar breast infections, recently requiring incision and drainage in the OR on 10/7/24 (Dr. Wilkins) for source control. She is now off of antibiotics but has had two subsequent wound infections at the previously healed I and D incision requiring wound care and antibiotics. Currently the incision is healed without acute breast infection. She has expressible left nipple discharge milky/purulent on today's exam.     We discussed that her recurrent left breast infections and changes could be related to a condition called SMOLD, squamous metaplasia of lactiferous ducts. This is thought to be related to exposure to nicotine. We discussed that in someone who has stopped all nicotine products, we can consider an aggressive intervention to address this condition with a central lumpectomy involving removal of the nipple areola complex, although we usually consider this a last resort in patients who have continued sinus tracts to the skin and significant symptomatology despite optimum antibiotics and source control. At this point, I do not see a targeted excision option to allow preservation of the nipple and areola as the recurrent infections recently are in a different place than her prior induration and the \"lumps\" and firmness moves throughout the subareolar region.     At this point, there is no pain, no signs of infection, the incision is well healed. I encouraged her to continue her smoking cessation efforts. We will obtain a breast MRI for further understanding of her disease and then contact the patient with further input.    She is very hesitant to pursue aggressive surgery to manage this benign disease but understands we have options if quality of life is difficult and recurrent infections " "continue. She will contact us with recurrent symptoms.      Subjective       Chief Complaint   Patient presents with    Follow-up     Patient is here for a wound check of the left breast. Pt states since her last visit the wound hasn't gotten worst but it isn't getting better. The wound scabbed over but there still greenish/white drainage and then there blood after. Pt denies foul smell. Pt reports mild soreness. Pt hasn't ntoice any new lumps, nipple dishcarge, redness/irriation or breast swelling. On her last visit she she had a rash under her left breast that subsided after [facility] applied clotrimazole-betamethasone cream.       Notes and photos reviewed, multiple recurrent infections with fluctuance and drainage at the incision line from prior I and D. Then it heals over. Currently dry scab along incision line, no acute cellulitis.       Review of Systems   Constitutional: Negative.    Skin:  Positive for wound. Negative for color change.   Hematological: Negative.    All other systems reviewed and are negative.      The following portions of the patient's history were reviewed and updated as appropriate: allergies, current medications, past family history, past medical history, past social history, past surgical history, and problem list.    Objective      Blood pressure 136/84, pulse 83, temperature 97.5 °F (36.4 °C), temperature source Temporal, height 5' 5\" (1.651 m), weight 129 kg (284 lb), SpO2 98%, not currently breastfeeding.   Physical Exam  Vitals reviewed.   Constitutional:       General: She is not in acute distress.     Appearance: She is obese. She is not ill-appearing or toxic-appearing.   HENT:      Head: Normocephalic.      Nose: No congestion.      Mouth/Throat:      Mouth: Mucous membranes are moist.   Eyes:      Pupils: Pupils are equal, round, and reactive to light.   Cardiovascular:      Rate and Rhythm: Normal rate.   Pulmonary:      Effort: Pulmonary effort is normal. No respiratory " distress.   Abdominal:      Palpations: Abdomen is soft.   Musculoskeletal:         General: Normal range of motion.      Cervical back: Normal range of motion.   Skin:     General: Skin is warm.      Capillary Refill: Capillary refill takes less than 2 seconds.      Comments: Left breast with expressible discharge via nipple, well healed periareolar incision medially with scab along midportion at site of recent fluctuance, no cellulitis, nontender   Neurological:      General: No focal deficit present.      Mental Status: She is alert. Mental status is at baseline.   Psychiatric:         Mood and Affect: Mood normal.         Signature:  Felicitas Munguia MD  Date: 1/13/2025 Time: 8:03 AM

## 2025-01-27 ENCOUNTER — OFFICE VISIT (OUTPATIENT)
Dept: OBGYN CLINIC | Facility: CLINIC | Age: 30
End: 2025-01-27
Payer: COMMERCIAL

## 2025-01-27 ENCOUNTER — APPOINTMENT (OUTPATIENT)
Dept: LAB | Facility: CLINIC | Age: 30
End: 2025-01-27
Payer: COMMERCIAL

## 2025-01-27 VITALS
WEIGHT: 281 LBS | HEIGHT: 65 IN | SYSTOLIC BLOOD PRESSURE: 190 MMHG | DIASTOLIC BLOOD PRESSURE: 100 MMHG | BODY MASS INDEX: 46.82 KG/M2

## 2025-01-27 DIAGNOSIS — Z12.4 CERVICAL CANCER SCREENING: ICD-10-CM

## 2025-01-27 DIAGNOSIS — Z01.419 WELL WOMAN EXAM: Primary | ICD-10-CM

## 2025-01-27 DIAGNOSIS — R10.2 PELVIC PAIN: ICD-10-CM

## 2025-01-27 DIAGNOSIS — N92.6 IRREGULAR MENSES: ICD-10-CM

## 2025-01-27 DIAGNOSIS — Z11.3 ROUTINE SCREENING FOR STI (SEXUALLY TRANSMITTED INFECTION): ICD-10-CM

## 2025-01-27 DIAGNOSIS — D21.9 LEIOMYOMA: ICD-10-CM

## 2025-01-27 LAB
ALBUMIN SERPL BCG-MCNC: 4.2 G/DL (ref 3.5–5)
ALP SERPL-CCNC: 56 U/L (ref 34–104)
ALT SERPL W P-5'-P-CCNC: 28 U/L (ref 7–52)
ANION GAP SERPL CALCULATED.3IONS-SCNC: 7 MMOL/L (ref 4–13)
AST SERPL W P-5'-P-CCNC: 21 U/L (ref 13–39)
B-HCG SERPL-ACNC: <0.6 MIU/ML (ref 0–5)
BASOPHILS # BLD AUTO: 0.05 THOUSANDS/ΜL (ref 0–0.1)
BASOPHILS NFR BLD AUTO: 1 % (ref 0–1)
BILIRUB SERPL-MCNC: 0.35 MG/DL (ref 0.2–1)
BUN SERPL-MCNC: 11 MG/DL (ref 5–25)
CALCIUM SERPL-MCNC: 9.2 MG/DL (ref 8.4–10.2)
CHLORIDE SERPL-SCNC: 104 MMOL/L (ref 96–108)
CO2 SERPL-SCNC: 26 MMOL/L (ref 21–32)
CREAT SERPL-MCNC: 0.6 MG/DL (ref 0.6–1.3)
EOSINOPHIL # BLD AUTO: 0.15 THOUSAND/ΜL (ref 0–0.61)
EOSINOPHIL NFR BLD AUTO: 2 % (ref 0–6)
ERYTHROCYTE [DISTWIDTH] IN BLOOD BY AUTOMATED COUNT: 13.4 % (ref 11.6–15.1)
EST. AVERAGE GLUCOSE BLD GHB EST-MCNC: 100 MG/DL
FSH SERPL-ACNC: 4 MIU/ML
GFR SERPL CREATININE-BSD FRML MDRD: 122 ML/MIN/1.73SQ M
GLUCOSE SERPL-MCNC: 96 MG/DL (ref 65–140)
HBA1C MFR BLD: 5.1 %
HCT VFR BLD AUTO: 41.2 % (ref 34.8–46.1)
HGB BLD-MCNC: 13.7 G/DL (ref 11.5–15.4)
IMM GRANULOCYTES # BLD AUTO: 0.01 THOUSAND/UL (ref 0–0.2)
IMM GRANULOCYTES NFR BLD AUTO: 0 % (ref 0–2)
LH SERPL-ACNC: 5.9 MIU/ML
LYMPHOCYTES # BLD AUTO: 2.01 THOUSANDS/ΜL (ref 0.6–4.47)
LYMPHOCYTES NFR BLD AUTO: 30 % (ref 14–44)
MCH RBC QN AUTO: 29 PG (ref 26.8–34.3)
MCHC RBC AUTO-ENTMCNC: 33.3 G/DL (ref 31.4–37.4)
MCV RBC AUTO: 87 FL (ref 82–98)
MONOCYTES # BLD AUTO: 0.36 THOUSAND/ΜL (ref 0.17–1.22)
MONOCYTES NFR BLD AUTO: 5 % (ref 4–12)
NEUTROPHILS # BLD AUTO: 4.08 THOUSANDS/ΜL (ref 1.85–7.62)
NEUTS SEG NFR BLD AUTO: 62 % (ref 43–75)
NRBC BLD AUTO-RTO: 0 /100 WBCS
PLATELET # BLD AUTO: 189 THOUSANDS/UL (ref 149–390)
PMV BLD AUTO: 11.3 FL (ref 8.9–12.7)
POTASSIUM SERPL-SCNC: 3.9 MMOL/L (ref 3.5–5.3)
PROT SERPL-MCNC: 6.9 G/DL (ref 6.4–8.4)
RBC # BLD AUTO: 4.73 MILLION/UL (ref 3.81–5.12)
SODIUM SERPL-SCNC: 137 MMOL/L (ref 135–147)
TSH SERPL DL<=0.05 MIU/L-ACNC: 2.33 UIU/ML (ref 0.45–4.5)
WBC # BLD AUTO: 6.66 THOUSAND/UL (ref 4.31–10.16)

## 2025-01-27 PROCEDURE — S0610 ANNUAL GYNECOLOGICAL EXAMINA: HCPCS

## 2025-01-27 PROCEDURE — G0476 HPV COMBO ASSAY CA SCREEN: HCPCS

## 2025-01-27 PROCEDURE — 83001 ASSAY OF GONADOTROPIN (FSH): CPT

## 2025-01-27 PROCEDURE — 36415 COLL VENOUS BLD VENIPUNCTURE: CPT

## 2025-01-27 PROCEDURE — 84443 ASSAY THYROID STIM HORMONE: CPT

## 2025-01-27 PROCEDURE — 80053 COMPREHEN METABOLIC PANEL: CPT

## 2025-01-27 PROCEDURE — 87491 CHLMYD TRACH DNA AMP PROBE: CPT

## 2025-01-27 PROCEDURE — 84403 ASSAY OF TOTAL TESTOSTERONE: CPT

## 2025-01-27 PROCEDURE — 83036 HEMOGLOBIN GLYCOSYLATED A1C: CPT

## 2025-01-27 PROCEDURE — 83002 ASSAY OF GONADOTROPIN (LH): CPT

## 2025-01-27 PROCEDURE — 84702 CHORIONIC GONADOTROPIN TEST: CPT

## 2025-01-27 PROCEDURE — 87661 TRICHOMONAS VAGINALIS AMPLIF: CPT

## 2025-01-27 PROCEDURE — 84402 ASSAY OF FREE TESTOSTERONE: CPT

## 2025-01-27 PROCEDURE — 85025 COMPLETE CBC W/AUTO DIFF WBC: CPT

## 2025-01-27 PROCEDURE — G0145 SCR C/V CYTO,THINLAYER,RESCR: HCPCS

## 2025-01-27 PROCEDURE — 87591 N.GONORRHOEAE DNA AMP PROB: CPT

## 2025-01-27 NOTE — PROGRESS NOTES
Subjective      Roxann Johnson is a 29 y.o. female who presents for annual GYN exam.     GYN:  She reports that she had a breast infection in left breast, which required surgery to I&D. This occurred in October. She got a menses in November but has skipped December and January.   Prior to this event, she reports regular menses, every 27 days, lasting 5 days. CD1-3 heavy bleeding with changing a pad every 3-4 hours.    Denies vaginal discharge, labial erythema or lesions, dyspareunia.  Hx of subserosal leiomyoma: 2.4x 2.8 x 2.6cm in . She reports intense cramping. She has it with menses and without menses. Last occurrence was 2 weeks ago.   Contraception: none. She has checked UPT in December, (negative UPT)  Patient is  sexually active with male partner.      OB:  OB History    Para Term  AB Living   1    1    SAB IAB Ectopic Multiple Live Births   1    0      # Outcome Date GA Lbr Jer/2nd Weight Sex Type Anes PTL Lv   1 SAB                  :  Denies dysuria, urinary frequency or urgency.  Denies hematuria, flank pain, incontinence.    Breast:  L breast abscess; she is seeing a breast surgeon in [place]. She has upcoming MRI.   Patient does  have a family history of breast, endometrial, colon, or ovarian ca.     Cancer-related family history includes Breast cancer in her maternal great-grandparent; Breast cancer (age of onset: 50) in her maternal grandmother; Cancer in her maternal grandmother.    Past Medical History:   Diagnosis Date    Hypertension     Miscarriage 2021    Morbid obesity with BMI of 40.0-44.9, adult (HCC)     Sleep apnea     possible undiagnosed RUBINA-snores frequently       Past Surgical History:   Procedure Laterality Date    BREAST CYST ASPIRATION Left     I&D Left Breast    BREAST CYST INCISION AND DRAINAGE Left 10/07/2024    Procedure: INCISION AND DRAINAGE (I&D) BREAST;  Surgeon: Leroy Wilkins MD;  Location: [facility];  Service: General    NO PAST SURGERIES    "        General:  BMI: 47  Work: [employer]  Safety: feels safe at home  Blood pressure is very elevated; she got a refill today and plans to restart medication. Denies symptoms.    Social History     Tobacco Use    Smoking status: Former     Current packs/day: 0.00     Types: Cigarettes     Start date: 2019     Quit date: 10/7/2024     Years since quittin.3    Smokeless tobacco: Never    Tobacco comments:     Stopped smoking since 10/06/24   Vaping Use    Vaping status: Former    Quit date: 2020   Substance Use Topics    Alcohol use: Yes     Comment: socially    Drug use: Never       Screening:  Cervical cancer: last pap smear in  NILM/HPV-. Patient has  received Gardasil vaccine.   Breast cancer: last mammogram in 2024. Results were B2.  Colon cancer: last colonoscopy in n/a  STD screening: n/a    Review of Systems   Constitutional:  Negative for fatigue.   Eyes:  Negative for photophobia and visual disturbance.   Respiratory:  Negative for cough and shortness of breath.    Cardiovascular:  Negative for chest pain and palpitations.   Gastrointestinal:  Negative for abdominal pain, blood in stool, constipation, diarrhea, nausea and rectal pain.   Genitourinary:  Positive for pelvic pain. Negative for dyspareunia, dysuria, flank pain, frequency, genital sores, menstrual problem, urgency, vaginal bleeding, vaginal discharge and vaginal pain.   Musculoskeletal:  Negative for arthralgias and back pain.   Skin:  Negative for rash.   Neurological:  Negative for weakness and headaches.          Objective      BP (!) 190/100 (BP Location: Left arm, Patient Position: Sitting, Cuff Size: Large)   Ht 5' 5\" (1.651 m)   Wt 127 kg (281 lb)   LMP 2024 (Approximate)   BMI 46.76 kg/m²   Physical Exam  Vitals and nursing note reviewed.   Constitutional:       Appearance: Normal appearance.   HENT:      Head: Normocephalic.   Eyes:      Conjunctiva/sclera: Conjunctivae normal. "   Cardiovascular:      Rate and Rhythm: Normal rate and regular rhythm.      Heart sounds: Normal heart sounds.   Pulmonary:      Effort: Pulmonary effort is normal.      Breath sounds: Normal breath sounds.   Chest:   Breasts:     Right: Normal. No inverted nipple, mass, nipple discharge, skin change or tenderness.      Left: Skin change present. No inverted nipple, mass, nipple discharge or tenderness.          Comments: Scar noted to inner aspect of nipple. Small 0.5 cm open area with serous drainage noted (scant).  Abdominal:      General: Abdomen is flat.      Palpations: Abdomen is soft. There is no mass.      Tenderness: There is no abdominal tenderness. There is no right CVA tenderness or left CVA tenderness.   Genitourinary:     General: Normal vulva.      Exam position: Lithotomy position.      Pubic Area: No rash or pubic lice.       Labia:         Right: No rash or tenderness.         Left: No rash or tenderness.       Urethra: No prolapse or urethral pain.      Vagina: Normal. No vaginal discharge.      Cervix: Normal.      Uterus: Normal.       Adnexa: Right adnexa normal and left adnexa normal.        Right: No mass or tenderness.          Left: No mass or tenderness.     Musculoskeletal:         General: Normal range of motion.      Cervical back: Normal range of motion. No tenderness.      Right lower leg: No edema.      Left lower leg: No edema.   Lymphadenopathy:      Cervical: No cervical adenopathy.      Upper Body:      Right upper body: No supraclavicular or axillary adenopathy.      Left upper body: No supraclavicular or axillary adenopathy.      Lower Body: No right inguinal adenopathy. No left inguinal adenopathy.   Skin:     General: Skin is warm and dry.      Findings: No rash.   Neurological:      Mental Status: She is alert and oriented to person, place, and time.   Psychiatric:         Mood and Affect: Mood normal.         Behavior: Behavior normal.         Thought Content: Thought  content normal.         Judgment: Judgment normal.                 Assessment/Plan  Problem List Items Addressed This Visit    None  Visit Diagnoses         Well woman exam    -  Primary      Irregular menses        Relevant Orders    Chlamydia/GC amplified DNA by PCR    Trichomonas vaginalis Thin prep    TSH, 3rd generation with Free T4 reflex    Hemoglobin A1C    CBC and differential    Comprehensive metabolic panel    Testosterone, free, total    Follicle stimulating hormone    Luteinizing hormone      Cervical cancer screening        Relevant Orders    Liquid-based pap, screening      Routine screening for STI (sexually transmitted infection)        Relevant Orders    Chlamydia/GC amplified DNA by PCR    Trichomonas vaginalis Thin prep      Pelvic pain        Relevant Orders    US pelvis complete w transvaginal      Leiomyoma        Relevant Orders    US pelvis complete w transvaginal            Reviewed: GYN concerns today. Discussed irregular menses may be from HPO axis disruption with stress from breast abscess and surgery. Blood work ordered to evaluate for other causes of skipped menses.  TVUS ordered for intermittent pelvic pain, menorrhagia, and dysmenorrhea.  Birth control: declines  Cervical cancer screening: pap collected today  Breast Cancer screening: advised to complete f/u breast imaging and continue working with breast specialist.  Colon cancer Screening: age 44  STD screening: desires  Reviewed healthy lifestyle and safe sex practices  RTO for annual exam or PRN      FAMILIA Sarmiento  OB/GYN  1/27/2025  1:04 PM

## 2025-01-28 LAB
HPV HR 12 DNA CVX QL NAA+PROBE: NEGATIVE
HPV16 DNA CVX QL NAA+PROBE: NEGATIVE
HPV18 DNA CVX QL NAA+PROBE: NEGATIVE

## 2025-01-29 ENCOUNTER — RESULTS FOLLOW-UP (OUTPATIENT)
Dept: LABOR AND DELIVERY | Facility: HOSPITAL | Age: 30
End: 2025-01-29

## 2025-01-29 LAB
C TRACH DNA SPEC QL NAA+PROBE: NEGATIVE
N GONORRHOEA DNA SPEC QL NAA+PROBE: NEGATIVE
T VAGINALIS DNA SPEC QL NAA+PROBE: NEGATIVE
TESTOST FREE SERPL-MCNC: 2 PG/ML (ref 0–4.2)
TESTOST SERPL-MCNC: 41 NG/DL (ref 13–71)

## 2025-01-30 ENCOUNTER — HOSPITAL ENCOUNTER (OUTPATIENT)
Dept: RADIOLOGY | Facility: HOSPITAL | Age: 30
Discharge: HOME | End: 2025-01-30
Payer: COMMERCIAL

## 2025-01-30 DIAGNOSIS — R10.2 PELVIC PAIN: ICD-10-CM

## 2025-01-30 DIAGNOSIS — D21.9 LEIOMYOMA: ICD-10-CM

## 2025-01-30 LAB
LAB AP GYN PRIMARY INTERPRETATION: NORMAL
Lab: NORMAL

## 2025-01-30 PROCEDURE — 76856 US EXAM PELVIC COMPLETE: CPT

## 2025-01-30 PROCEDURE — 76830 TRANSVAGINAL US NON-OB: CPT

## 2025-02-03 ENCOUNTER — HOSPITAL ENCOUNTER (OUTPATIENT)
Dept: RADIOLOGY | Facility: HOSPITAL | Age: 30
Discharge: HOME | End: 2025-02-03
Attending: SURGERY
Payer: COMMERCIAL

## 2025-02-03 DIAGNOSIS — N63.42 SUBAREOLAR LUMP OF LEFT BREAST: ICD-10-CM

## 2025-02-03 DIAGNOSIS — N61.0 BREAST INFECTION: ICD-10-CM

## 2025-02-03 DIAGNOSIS — Z80.3 FAMILY HISTORY OF BREAST CANCER: ICD-10-CM

## 2025-02-03 PROCEDURE — C8908 MRI W/O FOL W/CONT, BREAST,: HCPCS

## 2025-02-03 PROCEDURE — 77049 MRI BREAST C-+ W/CAD BI: CPT

## 2025-02-03 PROCEDURE — A9585 GADOBUTROL INJECTION: HCPCS | Performed by: SURGERY

## 2025-02-03 RX ORDER — GADOBUTROL 604.72 MG/ML
12 INJECTION INTRAVENOUS
Status: COMPLETED | OUTPATIENT
Start: 2025-02-03 | End: 2025-02-03

## 2025-02-03 RX ADMIN — GADOBUTROL 12 ML: 604.72 INJECTION INTRAVENOUS at 19:11

## 2025-02-04 ENCOUNTER — RESULTS FOLLOW-UP (OUTPATIENT)
Dept: SURGERY | Facility: CLINIC | Age: 30
End: 2025-02-04

## 2025-02-05 NOTE — RESULT ENCOUNTER NOTE
Happy to see that there are no high risk findings within the breasts that require biopsy or work up for cancer. There is a very small 1.7cm collection in the area of her prior infection and a sinus tract to the skin, the MRI shows inflammation/infection as we know that she has coming and going frequently over time. We can discuss further at her visit. Please ask her what her current symptoms are since I saw her last. Any redness, warmth, drainage, further antibiotics. Please confirm her upcoming visit.

## 2025-02-10 ENCOUNTER — OFFICE VISIT (OUTPATIENT)
Dept: SURGERY | Facility: CLINIC | Age: 30
End: 2025-02-10
Payer: COMMERCIAL

## 2025-02-10 VITALS
RESPIRATION RATE: 18 BRPM | SYSTOLIC BLOOD PRESSURE: 146 MMHG | TEMPERATURE: 98.9 F | HEART RATE: 92 BPM | BODY MASS INDEX: 46.65 KG/M2 | HEIGHT: 65 IN | OXYGEN SATURATION: 98 % | WEIGHT: 280 LBS | DIASTOLIC BLOOD PRESSURE: 90 MMHG

## 2025-02-10 DIAGNOSIS — N61.0: ICD-10-CM

## 2025-02-10 DIAGNOSIS — N63.42 SUBAREOLAR LUMP OF LEFT BREAST: ICD-10-CM

## 2025-02-10 DIAGNOSIS — Z80.3 FAMILY HISTORY OF BREAST CANCER: ICD-10-CM

## 2025-02-10 DIAGNOSIS — N61.1 LEFT BREAST ABSCESS: Primary | ICD-10-CM

## 2025-02-10 PROCEDURE — 99214 OFFICE O/P EST MOD 30 MIN: CPT | Performed by: SURGERY

## 2025-02-12 ENCOUNTER — TELEPHONE (OUTPATIENT)
Dept: SURGERY | Facility: CLINIC | Age: 30
End: 2025-02-12

## 2025-02-12 NOTE — TELEPHONE ENCOUNTER
"Case reviewed with my surgical colleague. MRI imaging reviewed. The patient is a candidate for reexcision of the prior scar and the associated chronic cavity. This is the \"smaller\" procedure we were discussing. This may be all we need to do to get permanent wound healing of the area. There is a risk of recurrent wound issues and infections but it may also be curative. Would be a nonurgent decision on her part if she would like to proceed. We would send everything for pathology. We would try to close the area with sutures rather than leave it open but that would be a decision made in real time during the surgery depending upon how the tissues look. It would be an elective same day surgery, outpatient. As of now we are scheduled to meet again in [place] in May. I am happy to coordinate this procedure at [place] prior to May or we can discuss the option again in detail at our May visit, whatever she is comfortable with.  "

## 2025-02-17 ENCOUNTER — OFFICE VISIT (OUTPATIENT)
Age: 30
End: 2025-02-17
Payer: COMMERCIAL

## 2025-02-17 VITALS
SYSTOLIC BLOOD PRESSURE: 150 MMHG | TEMPERATURE: 99.2 F | WEIGHT: 283.4 LBS | HEART RATE: 100 BPM | HEIGHT: 65 IN | RESPIRATION RATE: 16 BRPM | DIASTOLIC BLOOD PRESSURE: 86 MMHG | BODY MASS INDEX: 47.22 KG/M2

## 2025-02-17 DIAGNOSIS — R06.83 SNORING: ICD-10-CM

## 2025-02-17 DIAGNOSIS — E78.5 DYSLIPIDEMIA: ICD-10-CM

## 2025-02-17 DIAGNOSIS — I10 PRIMARY HYPERTENSION: ICD-10-CM

## 2025-02-17 DIAGNOSIS — E66.01 OBESITY, CLASS III, BMI 40-49.9 (MORBID OBESITY) (HCC): Primary | ICD-10-CM

## 2025-02-17 PROCEDURE — 99204 OFFICE O/P NEW MOD 45 MIN: CPT | Performed by: PHYSICIAN ASSISTANT

## 2025-02-17 RX ORDER — TIRZEPATIDE 2.5 MG/.5ML
2.5 INJECTION, SOLUTION SUBCUTANEOUS WEEKLY
Qty: 2 ML | Refills: 0 | Status: SHIPPED | OUTPATIENT
Start: 2025-02-17 | End: 2025-03-17

## 2025-02-17 NOTE — LETTER
February 17, 2025     Kailash Curran MD  [address] [facility]  [place] [address]    Patient: Roxann Johnson   YOB: 1995   Date of Visit: 2/17/2025       Dear Dr. Curran:    Thank you for referring Roxann Johnson to me for evaluation. Below are the relevant portions of my assessment and plan of care.         If you have questions, please do not hesitate to call me. I look forward to following Roxann along with you.         Sincerely,        Pam Oshea PA-C        CC: No Recipients

## 2025-02-17 NOTE — PROGRESS NOTES
Assessment/Plan:  Roxann was seen today for follow-up.    Diagnoses and all orders for this visit:    Obesity, Class III, BMI 40-49.9 (morbid obesity) (HCC)    Primary hypertension    BMI 45.0-49.9, adult (HCC)    Dyslipidemia       No problem-specific Assessment & Plan notes found for this encounter.     - Discussed options of HealthyCORE-Intensive Lifestyle Intervention Program, Very Low Calorie Diet-VLCD, and Conservative Program and the role of weight loss medications.  - Explained the importance of making lifestyle changes first before starting anti-obesity medications.  - Patient should demonstrate lifestyle changes first before anti-obesity medication initiated.   - Patient is interested in pursuing Conservative Program  - Initial weight loss goal of 5-10% weight loss for improved health as studies have shown this is where we see the greatest impact on improving health and decreasing risk of obesity related conditions.  - Weight loss can improve patient's co-morbid conditions and/or prevent weight-related complications.  - Stop Bang 6/8  - Labs reviewed: As below.      General Recommendations:  Nutrition:  Eat breakfast daily.  Do not skip meals.     Food log (ie.) www.[employer].com, sparkpeople.com, loseit.com, calorieking.com, etc.    Practice mindful eating.  Be sure to set aside time to eat, eat slowly, and savor your food.    Hydration:    At least 64oz of water daily.  No sugar sweetened beverages.  No juice (eat the fruit instead).    Exercise:  Studies have shown that the ideal exercise goal is somewhere between 150 to 300 minutes of moderate intensity exercise a week.  Start with exercising 10 minutes every other day and gradually increase physical activity with a goal of at least 150 minutes of moderate intensity exercise a week, divided over at least 3 days a week.  An example of this would be exercising 30 minutes a day, 5 days a week.  Resistance training can increase muscle mass and increase  our resting metabolic rate.   FULL BODY resistance training is recommended 2-3 times a week.  Do not do this on consecutive days to allow for muscle recovery.    Aim for a bare minimum 5000 steps, even on days you do not exercise.    Monitoring:   Weigh yourself daily.  If this causes undue stress, then just weigh yourself once a week.  Weigh yourself the same time of the day with the same amount of clothing on.  Preferably this should be done after waking up, before you eat, and with no clothing or minimal clothing on.    Specific Goals:  Food log (ie.) www.[employer].com,sparkpeople.com,[employer].com,"[employer].com,etc.   Gradually increase physical activity to a goal of 5 days per week for 30 minutes of MODERATE intensity PLUS 2 days per week of FULL BODY resistance training  Goal protein intake of 60-80 grams per day    Calorie goal:  (Provided with meal plan to follow).    Return visit:  2 months   1) Referral to behavioral health to help with food addiction/relationship to food and triggers. Needs help with accountability and working through some of the food issues she has had since she was young.     2)  RX Zepbound 2.5mg x 4 weeks and then increase to 5mg once weekly. Emphasized the importance of adherence to the medication schedule and lifestyle modifications, including diet and exercise. Provided patient with written materials on Zepbound usage, dietary plans, and exercise recommendations. Discussed the importance of regular monitoring and follow up to ensure the safety and effectiveness of the treatment.  Education provided on side effects, how to monitor and when to see medical attention.  Patient received training on self-administration of the injection. Goal of treatment is to attain a weight loss of approximately 5-10% TBW within next 3-6 months.  Goal is to achieve weight loss to improve overall health and reduce risks associated with obesity and weight related comorbidities.      I have sent Zebound  "to your pharmacy. The prior authorization process will been done through our prior authorization team and can take up to 3-4 weeks to process through the insurance.     - Start Zepbound 2.5 mg subcutaneously weekly. After you have taken the second pen, please give me an update, as we will likely increase the dose the next month if you are tolerating it well.    - Side effects of Zepbound include nausea, vomiting, diarrhea, or constipation. Keep an eye on your heart rate while on Zepbound. If you resting heart rate is greater than 100 beats per minutes, please notify me. If you develop severe abdominal pain, stop Zepbound and go to the emergency room, as that could be a sign of pancreatitis.     - Please notify me if you have surgery, upper endoscopy, or colonoscopy scheduled, as we typically hold Zepbound for one week prior to the procedure.     - Zepbound can reduce the effectiveness of oral hormonal birth control (birth control pills). Recommend a barrier backup method such as condoms to prevent pregnancy.       Nutrition RX:  - start tracking intake  - focus on protein- goal is 30 grams per meal; 90 grams per day  - focus on increasing fiber first by increasing vegetable servings per day  - do not skip breakfast and goal water intake 64 oz daily    Physical Activity RX:  - Goal is 150-200 mins of activity weekly.  Try to include at least 2 strength training sessions; increasing lean body mass will really help you to lose weight and maintain weight loss.      Total time spent reviewing chart, interviewing patient, examining patient, discussing plan, answering all questions, and documentin min.       ______________________________________________________________________        Subjective:   Chief Complaint   Patient presents with    Follow-up     MWM- OD F/u; Waist-53in; GW- 180-200lb; Stop Bang-     Waist 53\"  Neck 18\"  STOP-BANG   Goal weight: 180-200 lbs     HPI: Roxann Johnson  is a 29 y.o. female " with history of HTN, sleep apnea, and excess weight, here to pursue weight loss management.  Previous notes and records have been reviewed.  Goal- wants to have a baby in the next 2 years so wants to get a healthier weight. Wants to come off of blood pressure medication     Weight History:  Struggled since childhood with weight. About 7-8 yrs ago lost a lot of weight down to 194 lbs and felt very good. She was going to the gym 5x per week and was vegan when she lost the weight but then gained it back.         HPI  Wt Readings from Last 20 Encounters:   02/17/25 129 kg (283 lb 6.4 oz)   02/10/25 127 kg (280 lb)   01/27/25 127 kg (281 lb)   01/09/25 129 kg (284 lb)   12/23/24 128 kg (282 lb)   11/27/24 134 kg (296 lb 3.2 oz)   11/11/24 135 kg (297 lb)   10/11/24 134 kg (296 lb)   10/06/24 132 kg (291 lb 14.2 oz)   09/17/24 127 kg (280 lb)   02/08/23 129 kg (283 lb 6.4 oz)   01/18/23 126 kg (277 lb 14.4 oz)   04/25/22 124 kg (273 lb 12.8 oz)   06/28/21 123 kg (271 lb 9.6 oz)   09/21/20 121 kg (265 lb 12.8 oz)   08/05/20 118 kg (260 lb)   06/29/20 119 kg (262 lb 5.6 oz)   06/24/20 113 kg (249 lb 9 oz)   06/16/20 118 kg (260 lb 6.4 oz)   06/05/20 119 kg (261 lb 6.4 oz)     Excess Weight:  Severity: severe  Onset:  childhood       Food logging:  B: meal prep- overnight oats, spinach omelette with turkey cast  S:  L: side salad, spring mix and spinach, chicken or steak, side of fruit   S:  D: protein, side salad, baked potato or brown rice   S:    Hunger/Cravings:   Dining out: rare   Hydration: water, sugar free fruit punch, powders for water   Alcohol: 1-2 times per month   Smoking: none (quit on October)  Exercise: started back 3x per week at the gym; 1 hr of cardio   Weight Monitoring:  Sleep: tries to get 8 hr (5.5-6hrs)   STOP-BANG Score: 6/8   Occupation: special needs       Past Medical History:   Diagnosis Date    Hypertension     Miscarriage 6/2021    Morbid obesity with BMI of 40.0-44.9, adult  "(HCC)     Sleep apnea     possible undiagnosed RUBINA-snores frequently     Patient denies personal and family history of  pancreatitis, thyroid cancer, MEN-2 tumors.  Denies any hx of glaucoma, seizures, kidney stones, gallstones.  Denies Hx of CAD, PAD, palpitations, arrhythmia.   Denies uncontrolled anxiety or depression, suicidal behavior or thinking , insomnia or sleep disturbance.   Past Surgical History:   Procedure Laterality Date    BREAST CYST ASPIRATION Left     I&D Left Breast    BREAST CYST INCISION AND DRAINAGE Left 10/07/2024    Procedure: INCISION AND DRAINAGE (I&D) BREAST;  Surgeon: Leroy Wilkins MD;  Location: [facility];  Service: General    NO PAST SURGERIES       The following portions of the patient's history were reviewed and updated as appropriate: allergies, current medications, past family history, past medical history, past social history, past surgical history, and problem list.    Review Of Systems:  Review of Systems   Constitutional:  Positive for fatigue.   HENT: Negative.     Eyes: Negative.    Gastrointestinal:  Negative for constipation, diarrhea and nausea.   Genitourinary: Negative.    Musculoskeletal: Negative.    Skin: Negative.    Allergic/Immunologic: Negative.    Neurological: Negative.  Negative for headaches.   Hematological: Negative.    Psychiatric/Behavioral: Negative.         Objective:  /86 (BP Location: Left arm, Patient Position: Sitting)   Pulse 100   Temp 99.2 °F (37.3 °C) (Tympanic)   Resp 16   Ht 5' 5\" (1.651 m)   Wt 129 kg (283 lb 6.4 oz)   LMP 11/06/2024 (Approximate)   BMI 47.16 kg/m²   Physical Exam  Vitals reviewed.   Constitutional:       Appearance: She is obese.   HENT:      Head: Normocephalic.      Mouth/Throat:      Mouth: Mucous membranes are moist.   Eyes:      Pupils: Pupils are equal, round, and reactive to light.   Cardiovascular:      Rate and Rhythm: Normal rate and regular rhythm.   Pulmonary:      Effort: Pulmonary effort is " normal.      Breath sounds: Normal breath sounds.   Abdominal:      General: Bowel sounds are normal.      Palpations: Abdomen is soft.   Musculoskeletal:         General: Normal range of motion.      Cervical back: Normal range of motion.   Skin:     General: Skin is warm and dry.   Neurological:      General: No focal deficit present.      Mental Status: She is alert.   Psychiatric:         Mood and Affect: Mood normal.         Thought Content: Thought content normal.         Judgment: Judgment normal.         Labs and Imaging  Recent labs and imaging have been personally reviewed.  Lab Results   Component Value Date    WBC 6.66 01/27/2025    HGB 13.7 01/27/2025    HCT 41.2 01/27/2025    MCV 87 01/27/2025     01/27/2025     Lab Results   Component Value Date    SODIUM 137 01/27/2025    K 3.9 01/27/2025     01/27/2025    CO2 26 01/27/2025    AGAP 7 01/27/2025    BUN 11 01/27/2025    CREATININE 0.60 01/27/2025    GLUC 96 01/27/2025    GLUF 98 04/25/2022    CALCIUM 9.2 01/27/2025    AST 21 01/27/2025    ALT 28 01/27/2025    ALKPHOS 56 01/27/2025    TP 6.9 01/27/2025    TBILI 0.35 01/27/2025    EGFR 122 01/27/2025     Lab Results   Component Value Date    HGBA1C 5.1 01/27/2025     Lab Results   Component Value Date    WSW4IYWGAOHB 2.327 01/27/2025    TSH 3.87 05/21/2024     Lab Results   Component Value Date    CHOLESTEROL 153 04/25/2022     Lab Results   Component Value Date    HDL 27 (L) 04/25/2022     Lab Results   Component Value Date    TRIG 148 04/25/2022     Lab Results   Component Value Date    LDLCALC 96 04/25/2022

## 2025-02-19 ENCOUNTER — TELEPHONE (OUTPATIENT)
Age: 30
End: 2025-02-19

## 2025-02-19 NOTE — TELEPHONE ENCOUNTER
PA for Zepbound  EXCLUDED from plan       Reason:(Screenshot if applicable) Weight loss medication not covered under the patient benefit plan.        Message sent to office clinical pool No

## 2025-02-19 NOTE — TELEPHONE ENCOUNTER
PA for Zepbound SUBMITTED to [employer]    via    [x]CMM-KEY: [id number]  []Surescripts-Case ID #    []Availity-Auth ID #  NDC #    []Faxed to plan   []Other website    []Phone call Case ID #      []PA sent as URGENT    All office notes, labs and other pertaining documents and studies sent. Clinical questions answered. Awaiting determination from insurance company.     Turnaround time for your insurance to make a decision on your Prior Authorization can take 7-21 business days.

## 2025-02-20 ENCOUNTER — TELEPHONE (OUTPATIENT)
Age: 30
End: 2025-02-20

## 2025-03-05 PROBLEM — N61.0: Status: ACTIVE | Noted: 2025-03-05

## 2025-03-05 NOTE — PROGRESS NOTES
Progress Note - Surgical Oncology  Roxann Johnson 29 y.o. female MRN: [id number]  Encounter: [id number]    Assessment & Plan     29F with obesity, BMI 47, prior tobacco abuse, recently quit, longstanding recurrent left subareolar breast infections, recently requiring incision and drainage in the OR on 10/7/24 (Dr. Wilkins) for source control. The patient has had evidence of recurrent sinus tracts and infections at the incision and drainage wound site even after complete healing in between flares.      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 This is thought to be related to exposure to nicotine. We discussed that in someone who has stopped all nicotine products, we can consider an aggressive intervention to address this condition with a central lumpectomy involving removal of the nipple areola complex, although we usually consider this a last resort in patients who have continued sinus tracts to the skin and significant symptomatology despite optimum antibiotics and source control.    She recently underwent MRI imaging which showed a chronic collection and enhancing tract to the surface from that collection with chronic phlegmonous change. She continues to manage the symptoms as they arise and express discharge when it builds up, allow the site to heal, and then address it again when the symptoms recur.      We will see her back in 3 months in our [place] office for a clinical check in.     She is very hesitant to pursue aggressive surgery to manage this benign disease but understands we have options if quality of life is difficult and recurrent infections continue. She will contact us with recurrent symptoms.    Her case was reviewed with my colleague Dr. Bedolla who reviewed her MRI imaging and case and feels that a targeted excision of the chronic cavity and the prior incision and repeat  "closure of the area may be effective in breaking the cycle of her repeat infectious events and sinus tracts. We contacted the patient after the visit with this input but have not yet been able to get a hold of her to share this input.    Subjective       Chief Complaint   Patient presents with    Follow-up      MRI reviewed with the patient showing a chronic collection and tract to the prior incision and drainage site. Currently no cellulitis or drainage, scab along the midportion of the incision at the site that functions as a chronic sinus tract. Patient remains hesitant to pursue surgical management. She is eager to avoid further surgery. She is interested in future pregnancy and breastfeeding and wants to avoid further surgery. Would like to see me clinically again in our [place] office closer to her home.        Review of Systems   Constitutional: Negative.    Skin:  Positive for wound. Negative for color change.   Hematological: Negative.    All other systems reviewed and are negative.      The following portions of the patient's history were reviewed and updated as appropriate: allergies, current medications, past family history, past medical history, past social history, past surgical history, and problem list.    Objective      Blood pressure 146/90, pulse 92, temperature 98.9 °F (37.2 °C), temperature source Temporal, resp. rate 18, height 5' 5\" (1.651 m), weight 127 kg (280 lb), last menstrual period 11/06/2024, SpO2 98%, not currently breastfeeding.   Physical Exam  Vitals reviewed.   Constitutional:       General: She is not in acute distress.     Appearance: She is obese. She is not ill-appearing or toxic-appearing.   HENT:      Head: Normocephalic.      Nose: No congestion.      Mouth/Throat:      Mouth: Mucous membranes are moist.   Eyes:      Pupils: Pupils are equal, round, and reactive to light.   Cardiovascular:      Rate and Rhythm: Normal rate.   Pulmonary:      Effort: Pulmonary effort is " normal. No respiratory distress.   Abdominal:      Palpations: Abdomen is soft.   Musculoskeletal:         General: Normal range of motion.      Cervical back: Normal range of motion.   Skin:     General: Skin is warm.      Capillary Refill: Capillary refill takes less than 2 seconds.      Comments: Left breast incision well healed with tiny point of scabbing and partial superficial dehiscence along the incision from recent drainage, no cellulitis or fluctuance or expressible fluid today   Neurological:      General: No focal deficit present.      Mental Status: She is alert. Mental status is at baseline.   Psychiatric:         Mood and Affect: Mood normal.           I have spent a total time of 30 minutes in caring for this patient on the day of the visit/encounter including Diagnostic results, Prognosis, Risks and benefits of tx options, Instructions for management, Patient and family education, Impressions, Counseling / Coordination of care, Reviewing/placing orders in the medical record (including tests, medications, and/or procedures), Obtaining or reviewing history  , and Communicating with other healthcare professionals .       Signature:  Felicitas Munguia MD  Date: 3/5/2025 Time: 3:36 PM

## 2025-03-18 DIAGNOSIS — E78.5 DYSLIPIDEMIA: ICD-10-CM

## 2025-03-18 DIAGNOSIS — E66.01 OBESITY, CLASS III, BMI 40-49.9 (MORBID OBESITY) (HCC): ICD-10-CM

## 2025-03-18 DIAGNOSIS — I10 PRIMARY HYPERTENSION: Primary | ICD-10-CM

## 2025-03-18 RX ORDER — TIRZEPATIDE 2.5 MG/.5ML
2.5 INJECTION, SOLUTION SUBCUTANEOUS WEEKLY
Qty: 2 ML | Refills: 0 | Status: SHIPPED | OUTPATIENT
Start: 2025-03-18 | End: 2025-04-15

## 2025-04-01 ENCOUNTER — APPOINTMENT (EMERGENCY)
Dept: CT IMAGING | Facility: HOSPITAL | Age: 30
End: 2025-04-01
Payer: COMMERCIAL

## 2025-04-01 ENCOUNTER — HOSPITAL ENCOUNTER (EMERGENCY)
Facility: HOSPITAL | Age: 30
Discharge: HOME | End: 2025-04-01
Attending: EMERGENCY MEDICINE
Payer: COMMERCIAL

## 2025-04-01 VITALS
HEART RATE: 77 BPM | SYSTOLIC BLOOD PRESSURE: 180 MMHG | TEMPERATURE: 97.7 F | RESPIRATION RATE: 16 BRPM | OXYGEN SATURATION: 100 % | DIASTOLIC BLOOD PRESSURE: 110 MMHG

## 2025-04-01 DIAGNOSIS — R10.9 LEFT FLANK PAIN: ICD-10-CM

## 2025-04-01 DIAGNOSIS — N39.0 UTI (URINARY TRACT INFECTION): Primary | ICD-10-CM

## 2025-04-01 LAB
ALBUMIN SERPL BCG-MCNC: 4.3 G/DL (ref 3.5–5)
ALP SERPL-CCNC: 56 U/L (ref 34–104)
ALT SERPL W P-5'-P-CCNC: 23 U/L (ref 7–52)
ANION GAP SERPL CALCULATED.3IONS-SCNC: 8 MMOL/L (ref 4–13)
AST SERPL W P-5'-P-CCNC: 16 U/L (ref 13–39)
BACTERIA UR QL AUTO: ABNORMAL /HPF
BASOPHILS # BLD AUTO: 0.06 THOUSANDS/ÂΜL (ref 0–0.1)
BASOPHILS NFR BLD AUTO: 1 % (ref 0–1)
BILIRUB SERPL-MCNC: 0.46 MG/DL (ref 0.2–1)
BILIRUB UR QL STRIP: NEGATIVE
BUN SERPL-MCNC: 14 MG/DL (ref 5–25)
CALCIUM SERPL-MCNC: 9.3 MG/DL (ref 8.4–10.2)
CHLORIDE SERPL-SCNC: 105 MMOL/L (ref 96–108)
CLARITY UR: CLEAR
CO2 SERPL-SCNC: 26 MMOL/L (ref 21–32)
COLOR UR: ABNORMAL
CREAT SERPL-MCNC: 0.96 MG/DL (ref 0.6–1.3)
EOSINOPHIL # BLD AUTO: 0.31 THOUSAND/ÂΜL (ref 0–0.61)
EOSINOPHIL NFR BLD AUTO: 4 % (ref 0–6)
ERYTHROCYTE [DISTWIDTH] IN BLOOD BY AUTOMATED COUNT: 13.7 % (ref 11.6–15.1)
EXT PREGNANCY TEST URINE: NEGATIVE
EXT. CONTROL: NORMAL
GFR SERPL CREATININE-BSD FRML MDRD: 79 ML/MIN/1.73SQ M
GLUCOSE SERPL-MCNC: 101 MG/DL (ref 65–140)
GLUCOSE UR STRIP-MCNC: NEGATIVE MG/DL
HCG SERPL QL: NEGATIVE
HCT VFR BLD AUTO: 39.5 % (ref 34.8–46.1)
HGB BLD-MCNC: 13.3 G/DL (ref 11.5–15.4)
HGB UR QL STRIP.AUTO: ABNORMAL
IMM GRANULOCYTES # BLD AUTO: 0.03 THOUSAND/UL (ref 0–0.2)
IMM GRANULOCYTES NFR BLD AUTO: 0 % (ref 0–2)
KETONES UR STRIP-MCNC: NEGATIVE MG/DL
LEUKOCYTE ESTERASE UR QL STRIP: NEGATIVE
LIPASE SERPL-CCNC: 17 U/L (ref 11–82)
LYMPHOCYTES # BLD AUTO: 2.54 THOUSANDS/ÂΜL (ref 0.6–4.47)
LYMPHOCYTES NFR BLD AUTO: 32 % (ref 14–44)
MCH RBC QN AUTO: 28.9 PG (ref 26.8–34.3)
MCHC RBC AUTO-ENTMCNC: 33.7 G/DL (ref 31.4–37.4)
MCV RBC AUTO: 86 FL (ref 82–98)
MONOCYTES # BLD AUTO: 0.48 THOUSAND/ÂΜL (ref 0.17–1.22)
MONOCYTES NFR BLD AUTO: 6 % (ref 4–12)
NEUTROPHILS # BLD AUTO: 4.51 THOUSANDS/ÂΜL (ref 1.85–7.62)
NEUTS SEG NFR BLD AUTO: 57 % (ref 43–75)
NITRITE UR QL STRIP: NEGATIVE
NON-SQ EPI CELLS URNS QL MICRO: ABNORMAL /HPF
NRBC BLD AUTO-RTO: 0 /100 WBCS
PH UR STRIP.AUTO: 5 [PH]
PLATELET # BLD AUTO: 180 THOUSANDS/UL (ref 149–390)
PMV BLD AUTO: 10.2 FL (ref 8.9–12.7)
POTASSIUM SERPL-SCNC: 3.8 MMOL/L (ref 3.5–5.3)
PROT SERPL-MCNC: 7.4 G/DL (ref 6.4–8.4)
PROT UR STRIP-MCNC: NEGATIVE MG/DL
RBC # BLD AUTO: 4.6 MILLION/UL (ref 3.81–5.12)
RBC #/AREA URNS AUTO: ABNORMAL /HPF
SODIUM SERPL-SCNC: 139 MMOL/L (ref 135–147)
SP GR UR STRIP.AUTO: 1.01 (ref 1–1.03)
UROBILINOGEN UR STRIP-ACNC: <2 MG/DL
WBC # BLD AUTO: 7.93 THOUSAND/UL (ref 4.31–10.16)
WBC #/AREA URNS AUTO: ABNORMAL /HPF

## 2025-04-01 PROCEDURE — 74177 CT ABD & PELVIS W/CONTRAST: CPT

## 2025-04-01 PROCEDURE — 87077 CULTURE AEROBIC IDENTIFY: CPT | Performed by: EMERGENCY MEDICINE

## 2025-04-01 PROCEDURE — 96361 HYDRATE IV INFUSION ADD-ON: CPT

## 2025-04-01 PROCEDURE — 36415 COLL VENOUS BLD VENIPUNCTURE: CPT | Performed by: EMERGENCY MEDICINE

## 2025-04-01 PROCEDURE — 87086 URINE CULTURE/COLONY COUNT: CPT | Performed by: EMERGENCY MEDICINE

## 2025-04-01 PROCEDURE — 81001 URINALYSIS AUTO W/SCOPE: CPT | Performed by: EMERGENCY MEDICINE

## 2025-04-01 PROCEDURE — 81025 URINE PREGNANCY TEST: CPT | Performed by: EMERGENCY MEDICINE

## 2025-04-01 PROCEDURE — 83690 ASSAY OF LIPASE: CPT | Performed by: EMERGENCY MEDICINE

## 2025-04-01 PROCEDURE — 96374 THER/PROPH/DIAG INJ IV PUSH: CPT

## 2025-04-01 PROCEDURE — 99284 EMERGENCY DEPT VISIT MOD MDM: CPT

## 2025-04-01 PROCEDURE — 80053 COMPREHEN METABOLIC PANEL: CPT | Performed by: EMERGENCY MEDICINE

## 2025-04-01 PROCEDURE — 84703 CHORIONIC GONADOTROPIN ASSAY: CPT | Performed by: EMERGENCY MEDICINE

## 2025-04-01 PROCEDURE — 87186 SC STD MICRODIL/AGAR DIL: CPT | Performed by: EMERGENCY MEDICINE

## 2025-04-01 PROCEDURE — 85025 COMPLETE CBC W/AUTO DIFF WBC: CPT | Performed by: EMERGENCY MEDICINE

## 2025-04-01 PROCEDURE — 99284 EMERGENCY DEPT VISIT MOD MDM: CPT | Performed by: EMERGENCY MEDICINE

## 2025-04-01 RX ORDER — KETOROLAC TROMETHAMINE 30 MG/ML
15 INJECTION, SOLUTION INTRAMUSCULAR; INTRAVENOUS ONCE
Status: COMPLETED | OUTPATIENT
Start: 2025-04-01 | End: 2025-04-01

## 2025-04-01 RX ORDER — NAPROXEN 500 MG/1
500 TABLET ORAL 2 TIMES DAILY WITH MEALS
Qty: 30 TABLET | Refills: 0 | Status: SHIPPED | OUTPATIENT
Start: 2025-04-01

## 2025-04-01 RX ORDER — METHOCARBAMOL 500 MG/1
500 TABLET, FILM COATED ORAL 2 TIMES DAILY
Qty: 10 TABLET | Refills: 0 | Status: SHIPPED | OUTPATIENT
Start: 2025-04-01

## 2025-04-01 RX ORDER — CEFPODOXIME PROXETIL 200 MG/1
200 TABLET, FILM COATED ORAL ONCE
Status: COMPLETED | OUTPATIENT
Start: 2025-04-01 | End: 2025-04-01

## 2025-04-01 RX ORDER — LIDOCAINE 50 MG/G
1 PATCH TOPICAL ONCE
Status: DISCONTINUED | OUTPATIENT
Start: 2025-04-01 | End: 2025-04-01 | Stop reason: HOSPADM

## 2025-04-01 RX ORDER — CEFPODOXIME PROXETIL 200 MG/1
200 TABLET, FILM COATED ORAL 2 TIMES DAILY
Qty: 20 TABLET | Refills: 0 | Status: SHIPPED | OUTPATIENT
Start: 2025-04-01 | End: 2025-04-11

## 2025-04-01 RX ADMIN — IOHEXOL 100 ML: 350 INJECTION, SOLUTION INTRAVENOUS at 16:35

## 2025-04-01 RX ADMIN — KETOROLAC TROMETHAMINE 15 MG: 30 INJECTION, SOLUTION INTRAMUSCULAR at 15:48

## 2025-04-01 RX ADMIN — LIDOCAINE 5% 1 PATCH: 700 PATCH TOPICAL at 18:07

## 2025-04-01 RX ADMIN — CEFPODOXIME PROXETIL 200 MG: 200 TABLET, FILM COATED ORAL at 18:07

## 2025-04-01 RX ADMIN — SODIUM CHLORIDE 1000 ML: 0.9 INJECTION, SOLUTION INTRAVENOUS at 15:49

## 2025-04-01 NOTE — ED NOTES
Patient ambulated out of the ED, AAOx4 resp even and unlabored with no S$S of distress.       Melchor Manzo RN  04/01/25 1313

## 2025-04-01 NOTE — ED PROVIDER NOTES
Time reflects when diagnosis was documented in both MDM as applicable and the Disposition within this note       Time User Action Codes Description Comment    4/1/2025  5:54 PM Marly Clark Add [N39.0] UTI (urinary tract infection)     4/1/2025  5:54 PM Marly Clark Add [R10.9] Left flank pain           ED Disposition       ED Disposition   Discharge    Condition   Stable    Date/Time   Tue Apr 1, 2025  5:00 PM    Comment   Roxann Johnson discharge to home/self care.                   Assessment & Plan       Medical Decision Making  30 y/o female with L flank pain- will get labs, UA, urine preg, and ct scan. Will give toradol and reassess.     Patient labs, UA, and ct scan are unremarkable - will give with abx to cover potential UTI. Return precautions given     Amount and/or Complexity of Data Reviewed  Labs: ordered.  Radiology: ordered.    Risk  Prescription drug management.             Medications   lidocaine (LIDODERM) 5 % patch 1 patch (1 patch Topical Medication Applied 4/1/25 1807)   ketorolac (TORADOL) injection 15 mg (15 mg Intravenous Given 4/1/25 1548)   sodium chloride 0.9 % bolus 1,000 mL (0 mL Intravenous Stopped 4/1/25 1801)   iohexol (OMNIPAQUE) 350 MG/ML injection (SINGLE-DOSE) 100 mL (100 mL Intravenous Given 4/1/25 1635)   cefpodoxime (VANTIN) tablet 200 mg (200 mg Oral Given 4/1/25 1807)       ED Risk Strat Scores                                                History of Present Illness       Chief Complaint   Patient presents with    Back Pain     Pt arrives c/o lower L backc pain x 4 days. Pt denies injury/trauma. Reports urinary frequency.       Past Medical History:   Diagnosis Date    Hypertension     Miscarriage 6/2021    Morbid obesity with BMI of 40.0-44.9, adult (HCC)     Sleep apnea     possible undiagnosed RUBINA-snores frequently      Past Surgical History:   Procedure Laterality Date    BREAST CYST ASPIRATION Left     I&D Left Breast    BREAST CYST INCISION AND DRAINAGE  Left 10/07/2024    Procedure: INCISION AND DRAINAGE (I&D) BREAST;  Surgeon: Leroy Wilkins MD;  Location: [facility];  Service: General    NO PAST SURGERIES        Family History   Problem Relation Age of Onset    Hypertension Mother     Breast cancer Maternal Grandmother 50    Cancer Maternal Grandmother         Breast Cancer    Breast cancer Maternal Great-Grandparent       Social History     Tobacco Use    Smoking status: Former     Current packs/day: 0.00     Types: Cigarettes     Start date: 2019     Quit date: 10/7/2024     Years since quittin.4    Smokeless tobacco: Never    Tobacco comments:     Stopped smoking since 10/06/24   Vaping Use    Vaping status: Former    Quit date: 2020   Substance Use Topics    Alcohol use: Yes     Comment: socially    Drug use: Never      E-Cigarette/Vaping    E-Cigarette Use Former User     Quit Date 20       E-Cigarette/Vaping Substances    Nicotine No     THC No     CBD No     Flavoring No     Other No     Unknown No       I have reviewed and agree with the history as documented.     30 y/o female presents to the ED for L flank pain x 4 days. States that she has had constant achy pain with intermtitent throbbing sensation. Worse with certain movements/ positions. Denies any known injury or fall. She denies any n/v, fever, cp, sob, or d/c. Has had urinary frequency but denies any other urinary symptoms. Has tried tyelnol with some improvement. No other complaints. No hx of similar.       History provided by:  Patient      Review of Systems   Constitutional:  Negative for chills and fever.   HENT:  Negative for congestion, ear pain and sore throat.    Eyes:  Negative for pain and visual disturbance.   Respiratory:  Negative for cough, shortness of breath and wheezing.    Cardiovascular:  Negative for chest pain and leg swelling.   Gastrointestinal:  Negative for abdominal pain, diarrhea, nausea and vomiting.   Genitourinary:  Positive for flank pain and  frequency. Negative for dysuria, hematuria and urgency.   Musculoskeletal:  Negative for neck pain and neck stiffness.   Skin:  Negative for rash and wound.   Neurological:  Negative for weakness, numbness and headaches.   Psychiatric/Behavioral:  Negative for agitation and confusion.    All other systems reviewed and are negative.          Objective       ED Triage Vitals   Temperature Pulse Blood Pressure Respirations SpO2 Patient Position - Orthostatic VS   04/01/25 1520 04/01/25 1520 04/01/25 1520 04/01/25 1520 04/01/25 1520 04/01/25 1749   97.6 °F (36.4 °C) 95 (!) 203/95 17 99 % Sitting      Temp Source Heart Rate Source BP Location FiO2 (%) Pain Score    04/01/25 1520 04/01/25 1520 04/01/25 1520 -- 04/01/25 1520    Temporal Monitor Left arm  7      Vitals      Date and Time Temp Pulse SpO2 Resp BP Pain Score FACES Pain Rating User   04/01/25 1749 97.7 °F (36.5 °C) 77 100 % 16 180/110 4 -- AN   04/01/25 1548 -- -- -- -- -- 7 -- AN   04/01/25 1520 97.6 °F (36.4 °C) 95 99 % 17 203/95 7 -- DICK            Physical Exam  Vitals and nursing note reviewed.   Constitutional:       Appearance: She is well-developed.   HENT:      Head: Normocephalic and atraumatic.   Eyes:      Pupils: Pupils are equal, round, and reactive to light.   Cardiovascular:      Rate and Rhythm: Normal rate and regular rhythm.   Pulmonary:      Effort: Pulmonary effort is normal.      Breath sounds: Normal breath sounds.   Abdominal:      General: Bowel sounds are normal.      Palpations: Abdomen is soft.      Tenderness: There is no abdominal tenderness. There is left CVA tenderness.   Musculoskeletal:         General: Normal range of motion.      Cervical back: Normal range of motion and neck supple.   Skin:     General: Skin is warm and dry.   Neurological:      General: No focal deficit present.      Mental Status: She is alert and oriented to person, place, and time.      Comments: No focal deficits         Results Reviewed       Procedure  Component Value Units Date/Time    hCG, qualitative pregnancy [[id number]]  (Normal) Collected: 04/01/25 1544    Lab Status: Final result Specimen: Blood from Arm, Right Updated: 04/01/25 1726     Preg, Serum Negative    Urine Microscopic [[id number]]  (Abnormal) Collected: 04/01/25 1543    Lab Status: Final result Specimen: Urine, Clean Catch Updated: 04/01/25 1622     RBC, UA 4-10 /hpf      WBC, UA 10-20 /hpf      Epithelial Cells Occasional /hpf      Bacteria, UA Occasional /hpf     Urine culture [[id number]] Collected: 04/01/25 1543    Lab Status: In process Specimen: Urine, Clean Catch Updated: 04/01/25 1622    UA w Reflex to Microscopic w Reflex to Culture [[id number]]  (Abnormal) Collected: 04/01/25 1543    Lab Status: Final result Specimen: Urine, Clean Catch Updated: 04/01/25 1621     Color, UA Light Yellow     Clarity, UA Clear     Specific Gravity, UA 1.014     pH, UA 5.0     Leukocytes, UA Negative     Nitrite, UA Negative     Protein, UA Negative mg/dl      Glucose, UA Negative mg/dl      Ketones, UA Negative mg/dl      Urobilinogen, UA <2.0 mg/dl      Bilirubin, UA Negative     Occult Blood, UA Moderate    Comprehensive metabolic panel [[id number]] Collected: 04/01/25 1544    Lab Status: Final result Specimen: Blood from Arm, Right Updated: 04/01/25 1614     Sodium 139 mmol/L      Potassium 3.8 mmol/L      Chloride 105 mmol/L      CO2 26 mmol/L      ANION GAP 8 mmol/L      BUN 14 mg/dL      Creatinine 0.96 mg/dL      Glucose 101 mg/dL      Calcium 9.3 mg/dL      AST 16 U/L      ALT 23 U/L      Alkaline Phosphatase 56 U/L      Total Protein 7.4 g/dL      Albumin 4.3 g/dL      Total Bilirubin 0.46 mg/dL      eGFR 79 ml/min/1.73sq m     Narrative:      National Kid[employer] guidelines for Chronic Kidney Disease (CKD):     Stage 1 with normal or high GFR (GFR > 90 mL/min/1.73 square meters)    Stage 2 Mild CKD (GFR = 60-89 mL/min/1.73 square meters)    Stage 3A Moderate CKD (GFR = 45-59 mL/min/1.73  square meters)    Stage 3B Moderate CKD (GFR = 30-44 mL/min/1.73 square meters)    Stage 4 Severe CKD (GFR = 15-29 mL/min/1.73 square meters)    Stage 5 End Stage CKD (GFR <15 mL/min/1.73 square meters)  Note: GFR calculation is accurate only with a steady state creatinine    Lipase [[id number]]  (Normal) Collected: 04/01/25 1544    Lab Status: Final result Specimen: Blood from Arm, Right Updated: 04/01/25 1614     Lipase 17 u/L     CBC and differential [[id number]] Collected: 04/01/25 1546    Lab Status: Final result Specimen: Blood from Arm, Right Updated: 04/01/25 1555     WBC 7.93 Thousand/uL      RBC 4.60 Million/uL      Hemoglobin 13.3 g/dL      Hematocrit 39.5 %      MCV 86 fL      MCH 28.9 pg      MCHC 33.7 g/dL      RDW 13.7 %      MPV 10.2 fL      Platelets 180 Thousands/uL      nRBC 0 /100 WBCs      Segmented % 57 %      Immature Grans % 0 %      Lymphocytes % 32 %      Monocytes % 6 %      Eosinophils Relative 4 %      Basophils Relative 1 %      Absolute Neutrophils 4.51 Thousands/µL      Absolute Immature Grans 0.03 Thousand/uL      Absolute Lymphocytes 2.54 Thousands/µL      Absolute Monocytes 0.48 Thousand/µL      Eosinophils Absolute 0.31 Thousand/µL      Basophils Absolute 0.06 Thousands/µL     POCT pregnancy, urine [[id number]]  (Normal) Collected: 04/01/25 1500    Lab Status: Final result Specimen: Urine Updated: 04/01/25 1548     EXT Preg Test, Ur Negative     Control Valid            CT abdomen pelvis with contrast   Final Interpretation by Lionel Mehta MD (04/01 1727)      No acute inflammatory process identified.      Enlarged fatty liver.      Bilateral hypodense renal lesions some of which are indeterminate as described. Recommend renal protocol CT scan or MRI for further characterization.      Stable exophytic uterine fibroid measuring 4.9 cm.         Workstation performed: [id number]             Procedures    ED Medication and Procedure Management   Prior to Admission Medications    Prescriptions Last Dose Informant Patient Reported? Taking?   Multiple Vitamin (MULTIVITAMIN) tablet  Self Yes No   Sig: Take 1 tablet by mouth daily   VITAMIN D, CHOLECALCIFEROL, PO   Yes No   Sig: Take by mouth   clotrimazole-betamethasone (LOTRISONE) 1-0.05 % cream   No No   Sig: Apply topically 2 (two) times a day for 7 days   diclofenac sodium (VOLTAREN) 50 mg EC tablet   No No   Sig: Take 1 tablet (50 mg total) by mouth 2 (two) times a day for 7 days With food   Patient not taking: Reported on 11/27/2024   fluconazole (DIFLUCAN) 200 mg tablet  Self Yes No   Patient not taking: Reported on 11/27/2024   losartan (COZAAR) 50 mg tablet  Self Yes No   Sig: Take 50 mg by mouth daily   methocarbamol (ROBAXIN) 500 mg tablet   No No   Sig: Take 1 tablet (500 mg total) by mouth 4 (four) times a day for 12 doses   Patient not taking: Reported on 11/27/2024   nicotine (NICODERM CQ) 7 mg/24hr TD 24 hr patch  Self No No   Sig: Place 1 patch on the skin over 24 hours every 24 hours   Patient not taking: Reported on 11/27/2024   nystatin (MYCOSTATIN) powder  Self No No   Sig: Apply topically 2 (two) times a day   Patient not taking: Reported on 11/27/2024   tirzepatide (Zepbound) 2.5 mg/0.5 mL auto-injector   No No   Sig: Inject 0.5 mL (2.5 mg total) under the skin once a week for 28 days      Facility-Administered Medications: None     Discharge Medication List as of 4/1/2025  5:56 PM        START taking these medications    Details   cefpodoxime (VANTIN) 200 mg tablet Take 1 tablet (200 mg total) by mouth 2 (two) times a day for 10 days, Starting Tue 4/1/2025, Until Fri 4/11/2025, Normal      naproxen (Naprosyn) 500 mg tablet Take 1 tablet (500 mg total) by mouth 2 (two) times a day with meals, Starting Tue 4/1/2025, Normal           CONTINUE these medications which have CHANGED    Details   methocarbamol (ROBAXIN) 500 mg tablet Take 1 tablet (500 mg total) by mouth 2 (two) times a day, Starting Tue 4/1/2025, Normal            CONTINUE these medications which have NOT CHANGED    Details   clotrimazole-betamethasone (LOTRISONE) 1-0.05 % cream Apply topically 2 (two) times a day for 7 days, Starting Mon 12/23/2024, Until Mon 12/30/2024, Normal      diclofenac sodium (VOLTAREN) 50 mg EC tablet Take 1 tablet (50 mg total) by mouth 2 (two) times a day for 7 days With food, Starting Tue 9/17/2024, Until Tue 9/24/2024, Normal      fluconazole (DIFLUCAN) 200 mg tablet Historical Med      losartan (COZAAR) 50 mg tablet Take 50 mg by mouth daily, Historical Med      Multiple Vitamin (MULTIVITAMIN) tablet Take 1 tablet by mouth daily, Historical Med      nicotine (NICODERM CQ) 7 mg/24hr TD 24 hr patch Place 1 patch on the skin over 24 hours every 24 hours, Starting Wed 10/9/2024, Normal      nystatin (MYCOSTATIN) powder Apply topically 2 (two) times a day, Starting Mon 10/28/2024, Normal      tirzepatide (Zepbound) 2.5 mg/0.5 mL auto-injector Inject 0.5 mL (2.5 mg total) under the skin once a week for 28 days, Starting Tue 3/18/2025, Until Tue 4/15/2025, Normal      VITAMIN D, CHOLECALCIFEROL, PO Take by mouth, Historical Med           No discharge procedures on file.  ED SEPSIS DOCUMENTATION   Time reflects when diagnosis was documented in both MDM as applicable and the Disposition within this note       Time User Action Codes Description Comment    4/1/2025  5:54 PM Marly Clark Add [N39.0] UTI (urinary tract infection)     4/1/2025  5:54 PM Marly Clark Add [R10.9] Left flank pain                  Marly Clark DO  04/01/25 1937

## 2025-04-03 LAB — BACTERIA UR CULT: ABNORMAL

## 2025-04-15 ENCOUNTER — TELEPHONE (OUTPATIENT)
Dept: NEPHROLOGY | Facility: CLINIC | Age: 30
End: 2025-04-15

## 2025-04-15 PROBLEM — R93.41 ABNORMAL RADIOLOGIC FINDINGS ON DIAGNOSTIC IMAGING OF RENAL PELVIS, URETER, OR BLADDER: Status: ACTIVE | Noted: 2025-04-15

## 2025-05-02 ENCOUNTER — TELEPHONE (OUTPATIENT)
Age: 30
End: 2025-05-02

## 2025-05-02 NOTE — TELEPHONE ENCOUNTER
Patient called in regarding her follow up appointment with Dr. Munguia on 5/13. Patient just started a new job and was wondering if there was any availability after 2:30. Advised patient the next available appointment would be 7/22. She did not wish to schedule it that far out. If there is any way patient can be seen after 2:30 with Dr. Munguia, please call her at [phone] otherwise she will keep her original appointment the same and request off of work.

## 2025-05-02 NOTE — TELEPHONE ENCOUNTER
Placed call to patient and left a message to return our call.    Want to discuss other dates / other locations.

## 2025-05-14 ENCOUNTER — OFFICE VISIT (OUTPATIENT)
Dept: SURGERY | Facility: CLINIC | Age: 30
End: 2025-05-14

## 2025-05-14 VITALS
OXYGEN SATURATION: 96 % | DIASTOLIC BLOOD PRESSURE: 84 MMHG | WEIGHT: 289.6 LBS | SYSTOLIC BLOOD PRESSURE: 166 MMHG | HEIGHT: 65 IN | TEMPERATURE: 97.8 F | BODY MASS INDEX: 48.25 KG/M2 | HEART RATE: 94 BPM | RESPIRATION RATE: 16 BRPM

## 2025-05-14 DIAGNOSIS — N63.42 SUBAREOLAR LUMP OF LEFT BREAST: ICD-10-CM

## 2025-05-14 DIAGNOSIS — N61.0: Primary | ICD-10-CM

## 2025-05-14 PROCEDURE — 99214 OFFICE O/P EST MOD 30 MIN: CPT | Performed by: SURGERY

## 2025-05-14 RX ORDER — SODIUM CHLORIDE, SODIUM LACTATE, POTASSIUM CHLORIDE, CALCIUM CHLORIDE 600; 310; 30; 20 MG/100ML; MG/100ML; MG/100ML; MG/100ML
125 INJECTION, SOLUTION INTRAVENOUS CONTINUOUS
OUTPATIENT
Start: 2025-05-14

## 2025-05-14 NOTE — PROGRESS NOTES
Name: Roxann Johnson      : 1995      MRN: [id number]  Encounter Provider: Felicitas Munguia MD  Encounter Date: 2025   Encounter department: [facility] GENERAL SURGERY [facility]  :  Assessment & Plan  Sinus tract of breast  29F with obesity, BMI 47, prior tobacco abuse, recently quit, longstanding recurrent left subareolar breast infections, recently requiring incision and drainage in the OR on 10/7/24 (Dr. Wilkins) for source control. The patient has had evidence of recurrent sinus tracts and infections at the incision and drainage wound site even after complete healing in between flares.      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 This is thought to be related to exposure to nicotine. We discussed that in someone who has stopped all nicotine products, we can consider an aggressive intervention to address this condition with a central lumpectomy involving removal of the nipple areola complex, although we usually consider this a last resort in patients who have continued sinus tracts to the skin and significant symptomatology despite optimum antibiotics and source control.     She recently underwent MRI imaging which showed a chronic collection and enhancing tract to the surface from that collection with chronic phlegmonous change. She continues to manage the symptoms as they arise and express discharge when it builds up, allow the site to heal, and then address it again when the symptoms recur.      Her case was reviewed with my colleague Dr. Bedolla who reviewed her MRI imaging and case and feels that a targeted excision of the chronic cavity and the prior incision and repeat closure of the area may be effective in breaking the cycle of her repeat infectious events and sinus tracts.     The patient is likely agreeable to proceed. We put in a booking and consent for a left  breast excision of chronic inflammatory cavity, possible central lumpectomy, discussed risks including bleeding, infection, wound healing difficulties, recurrence, need for further procedures.     If the patient decides to proceed with scheduling, we will order 1 week of antibiotics to be taken prior to the procedure.     We will see her back in 6 months either way for a clinical check in.             Orders:    Case request operating room: LEFT BREAST EXCISION OF CHRONIC INFLAMMATORY CAVITY, POSSIBLE CENTRAL LUMPECTOMY; Standing    Subareolar lump of left breast  As above.  Orders:    Case request operating room: LEFT BREAST EXCISION OF CHRONIC INFLAMMATORY CAVITY, POSSIBLE CENTRAL LUMPECTOMY; Standing          History of Present Illness   Roxann Johnson is a 29 y.o. female ongoing cycle of left breast inflammation/infection flares. Drainage from the sinus tract at her old incision on the left breast. Firm, tender lumps subareolar. Has not needed antibiotics. Flares managed with heat. Currently quiet. Leaning towards surgical management. Has not been smoking at all.  History of Present Illness    Review of Systems   Constitutional: Negative.    Skin:  Positive for wound. Negative for color change.   Hematological: Negative.    All other systems reviewed and are negative.   as per HPI.  Past Medical History   Past Medical History:   Diagnosis Date    Hypertension     Miscarriage 6/2021    Morbid obesity with BMI of 40.0-44.9, adult (HCC)     Sleep apnea     possible undiagnosed RUBINA-snores frequently     Past Surgical History:   Procedure Laterality Date    BREAST CYST ASPIRATION Left     I&D Left Breast    BREAST CYST INCISION AND DRAINAGE Left 10/07/2024    Procedure: INCISION AND DRAINAGE (I&D) BREAST;  Surgeon: Leroy Wilkins MD;  Location: [facility];  Service: General    NO PAST SURGERIES       Family History   Problem Relation Age of Onset    Hypertension Mother     Sleep apnea Mother     Snoring Mother      "Breast cancer Maternal Grandmother 50    Cancer Maternal Grandmother         Breast Cancer    Breast cancer Maternal Great-Grandparent       reports that she quit smoking about 7 months ago. Her smoking use included cigarettes. She started smoking about 6 years ago. She has never used smokeless tobacco. She reports current alcohol use. She reports that she does not use drugs.  Current Outpatient Medications   Medication Instructions    losartan (COZAAR) 50 mg, Daily    Multiple Vitamin (MULTIVITAMIN) tablet 1 tablet, Daily    VITAMIN D, CHOLECALCIFEROL, PO Oral   Allergies[1]   Medications Ordered Prior to Encounter[2]   Social History     Tobacco Use    Smoking status: Former     Current packs/day: 0.00     Types: Cigarettes     Start date: 2019     Quit date: 10/7/2024     Years since quittin.6    Smokeless tobacco: Never    Tobacco comments:     Stopped smoking since 10/06/24   Vaping Use    Vaping status: Former    Quit date: 2020   Substance and Sexual Activity    Alcohol use: Yes     Comment: socially    Drug use: Never    Sexual activity: Yes     Partners: Male     Birth control/protection: Condom Male, None        Objective   /84 (BP Location: Left arm, Patient Position: Sitting, Cuff Size: Large)   Pulse 94   Temp 97.8 °F (36.6 °C) (Temporal)   Resp 16   Ht 5' 5\" (1.651 m)   Wt 131 kg (289 lb 9.6 oz)   SpO2 96%   BMI 48.19 kg/m²      Physical Exam  Vitals reviewed.   Constitutional:       General: She is not in acute distress.     Appearance: She is obese. She is not ill-appearing or toxic-appearing.   HENT:      Head: Normocephalic.      Nose: No congestion.      Mouth/Throat:      Mouth: Mucous membranes are moist.     Eyes:      Pupils: Pupils are equal, round, and reactive to light.       Cardiovascular:      Rate and Rhythm: Normal rate.   Pulmonary:      Effort: Pulmonary effort is normal. No respiratory distress.   Abdominal:      Palpations: Abdomen is soft. "     Musculoskeletal:         General: Normal range of motion.      Cervical back: Normal range of motion.     Skin:     General: Skin is warm.      Capillary Refill: Capillary refill takes less than 2 seconds.     Neurological:      General: No focal deficit present.      Mental Status: She is alert. Mental status is at baseline.     Psychiatric:         Mood and Affect: Mood normal.        The patient has no palpable cervical, supraclavicular, or axillary lymphadenopathy bilaterally.  The left medial breast incision is healed with midportion scabbing that is chronic, nothing expressible from that site, chronic induration deep to the areola, currently no cellulitis or fluctuance. There are otherwise no dominant lumps, masses, skin changes or nipple retraction bilaterally.     Administrative Statements   I have spent a total time of 30 minutes in caring for this patient on the day of the visit/encounter including Diagnostic results, Prognosis, Risks and benefits of tx options, Instructions for management, Patient and family education, Impressions, Counseling / Coordination of care, Documenting in the medical record, Reviewing/placing orders in the medical record (including tests, medications, and/or procedures), and Obtaining or reviewing history  .       [1] No Known Allergies  [2]   Current Outpatient Medications on File Prior to Visit   Medication Sig Dispense Refill    losartan (COZAAR) 50 mg tablet Take 50 mg by mouth in the morning.      Multiple Vitamin (MULTIVITAMIN) tablet Take 1 tablet by mouth in the morning.      VITAMIN D, CHOLECALCIFEROL, PO Take by mouth      [DISCONTINUED] clotrimazole-betamethasone (LOTRISONE) 1-0.05 % cream Apply topically 2 (two) times a day for 7 days 15 g 1    [DISCONTINUED] diclofenac sodium (VOLTAREN) 50 mg EC tablet Take 1 tablet (50 mg total) by mouth 2 (two) times a day for 7 days With food (Patient not taking: Reported on 11/27/2024) 14 tablet 0    [DISCONTINUED]  fluconazole (DIFLUCAN) 200 mg tablet  (Patient not taking: Reported on 11/27/2024)      [DISCONTINUED] methocarbamol (ROBAXIN) 500 mg tablet Take 1 tablet (500 mg total) by mouth 4 (four) times a day for 12 doses (Patient not taking: Reported on 11/27/2024) 12 tablet 0    [DISCONTINUED] methocarbamol (ROBAXIN) 500 mg tablet Take 1 tablet (500 mg total) by mouth 2 (two) times a day 10 tablet 0    [DISCONTINUED] naproxen (Naprosyn) 500 mg tablet Take 1 tablet (500 mg total) by mouth 2 (two) times a day with meals 30 tablet 0    [DISCONTINUED] nicotine (NICODERM CQ) 7 mg/24hr TD 24 hr patch Place 1 patch on the skin over 24 hours every 24 hours (Patient not taking: Reported on 11/27/2024) 28 patch 2    [DISCONTINUED] nystatin (MYCOSTATIN) powder Apply topically 2 (two) times a day (Patient not taking: Reported on 11/27/2024) 30 g 2     No current facility-administered medications on file prior to visit.

## 2025-05-14 NOTE — ASSESSMENT & PLAN NOTE
As above.  Orders:    Case request operating room: LEFT BREAST EXCISION OF CHRONIC INFLAMMATORY CAVITY, POSSIBLE CENTRAL LUMPECTOMY; Standing

## 2025-05-14 NOTE — ASSESSMENT & PLAN NOTE
29F with obesity, BMI 47, prior tobacco abuse, recently quit, longstanding recurrent left subareolar breast infections, recently requiring incision and drainage in the OR on 10/7/24 (Dr. Wilkins) for source control. The patient has had evidence of recurrent sinus tracts and infections at the incision and drainage wound site even after complete healing in between flares.      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 This is thought to be related to exposure to nicotine. We discussed that in someone who has stopped all nicotine products, we can consider an aggressive intervention to address this condition with a central lumpectomy involving removal of the nipple areola complex, although we usually consider this a last resort in patients who have continued sinus tracts to the skin and significant symptomatology despite optimum antibiotics and source control.     She recently underwent MRI imaging which showed a chronic collection and enhancing tract to the surface from that collection with chronic phlegmonous change. She continues to manage the symptoms as they arise and express discharge when it builds up, allow the site to heal, and then address it again when the symptoms recur.      Her case was reviewed with my colleague Dr. Bedolla who reviewed her MRI imaging and case and feels that a targeted excision of the chronic cavity and the prior incision and repeat closure of the area may be effective in breaking the cycle of her repeat infectious events and sinus tracts.     The patient is likely agreeable to proceed. We put in a booking and consent for a left breast excision of chronic inflammatory cavity, possible central lumpectomy, discussed risks including bleeding, infection, wound healing difficulties, recurrence, need for further procedures.     If the patient decides to proceed with  scheduling, we will order 1 week of antibiotics to be taken prior to the procedure.     We will see her back in 6 months either way for a clinical check in.             Orders:    Case request operating room: LEFT BREAST EXCISION OF CHRONIC INFLAMMATORY CAVITY, POSSIBLE CENTRAL LUMPECTOMY; Standing

## 2025-05-19 ENCOUNTER — TELEPHONE (OUTPATIENT)
Dept: SURGERY | Facility: CLINIC | Age: 30
End: 2025-05-19

## (undated) DEVICE — INTENDED FOR TISSUE SEPARATION, AND OTHER PROCEDURES THAT REQUIRE A SHARP SURGICAL BLADE TO PUNCTURE OR CUT.: Brand: BARD-PARKER ® CARBON RIB-BACK BLADES

## (undated) DEVICE — GLOVE SRG BIOGEL 7

## (undated) DEVICE — NEEDLE 25G X 1 1/2

## (undated) DEVICE — GARMENT,MEDLINE,DVT,INT,CALF,FOAM,MED: Brand: MEDLINE

## (undated) DEVICE — POV-IOD SOLUTION 4OZ BT

## (undated) DEVICE — GLOVE SRG BIOGEL 7.5

## (undated) DEVICE — BETHLEHEM UNIVERSAL MINOR GEN: Brand: CARDINAL HEALTH

## (undated) DEVICE — SYRINGE 10ML LL

## (undated) DEVICE — NEPTUNE E-SEP SMOKE EVACUATION PENCIL, COATED, 70MM BLADE, PUSH BUTTON SWITCH: Brand: NEPTUNE E-SEP

## (undated) DEVICE — POOLE SUCTION HANDLE W/TUBING: Brand: CARDINAL HEALTH

## (undated) DEVICE — GLOVE INDICATOR PI UNDERGLOVE SZ 8 BLUE

## (undated) DEVICE — PREMIUM DRY TRAY LF: Brand: MEDLINE INDUSTRIES, INC.

## (undated) DEVICE — CULTURE TUBE ANAEROBIC

## (undated) DEVICE — STRL PENROSE DRAIN 18" X 1/4": Brand: CARDINAL HEALTH

## (undated) DEVICE — CUP MEDICINE 1OZ 5000/CS 50/PLT: Brand: MEDEGEN MEDICAL PRODUCTS, LLC

## (undated) DEVICE — 4-PORT MANIFOLD: Brand: NEPTUNE 2

## (undated) DEVICE — GLOVE INDICATOR PI UNDERGLOVE SZ 7.5 BLUE

## (undated) DEVICE — GAUZE SPONGES,16 PLY: Brand: CURITY

## (undated) DEVICE — CULTURE TUBE AEROBIC

## (undated) DEVICE — TOWEL SURG XR DETECT GREEN STRL RFD